# Patient Record
Sex: MALE | Race: WHITE | NOT HISPANIC OR LATINO | Employment: OTHER | URBAN - METROPOLITAN AREA
[De-identification: names, ages, dates, MRNs, and addresses within clinical notes are randomized per-mention and may not be internally consistent; named-entity substitution may affect disease eponyms.]

---

## 2017-08-14 ENCOUNTER — TELEPHONE (OUTPATIENT)
Dept: HEMATOLOGY/ONCOLOGY | Facility: CLINIC | Age: 62
End: 2017-08-14

## 2017-08-14 ENCOUNTER — TELEPHONE (OUTPATIENT)
Dept: UROLOGY | Facility: CLINIC | Age: 62
End: 2017-08-14

## 2017-08-14 NOTE — TELEPHONE ENCOUNTER
----- Message from Krystyna Rodas sent at 8/14/2017 12:17 PM CDT -----  Contact: pt   States he has bladder cancer, tumor removed but was told to have oncology and cysto scopes done   Call back        Message sent to oncology and urology

## 2017-08-14 NOTE — TELEPHONE ENCOUNTER
Spoke to pt and he is going to call back if he needs an apt with is. He says that he saw a dr valentine at [facility] and is trying to get in to see him.

## 2017-08-14 NOTE — TELEPHONE ENCOUNTER
----- Message from Krystyna Rodas sent at 8/14/2017 12:15 PM CDT -----  Contact: bruna  States he has bladder cancer, tumor removed but was told to have oncology and cysto scopes done   Call back      Message sent to oncology and urology

## 2017-08-15 ENCOUNTER — TELEPHONE (OUTPATIENT)
Dept: INFUSION THERAPY | Facility: HOSPITAL | Age: 62
End: 2017-08-15

## 2018-06-01 PROBLEM — L72.3 SEBACEOUS CYST: Status: ACTIVE | Noted: 2018-06-01

## 2018-06-01 PROBLEM — L02.212 ABSCESS OF BACK: Status: ACTIVE | Noted: 2018-06-01

## 2018-06-01 PROBLEM — L72.3 SEBACEOUS CYST: Status: RESOLVED | Noted: 2018-06-01 | Resolved: 2018-06-01

## 2018-09-28 ENCOUNTER — PATIENT OUTREACH (OUTPATIENT)
Dept: ADMINISTRATIVE | Facility: HOSPITAL | Age: 63
End: 2018-09-28

## 2018-09-28 NOTE — PROGRESS NOTES
Health Maintenance Due   Topic Date Due    Hepatitis C Screening  1955    Lipid Panel  1955    TETANUS VACCINE  02/08/1971    Colonoscopy  02/08/2003    Zoster Vaccine  02/08/2013    Pneumococcal (65+) (1 of 2 - PCV13) 02/08/2018    Abdominal Aortic Aneurysm Screening  02/08/2018    Influenza Vaccine  08/01/2018

## 2018-10-08 ENCOUNTER — PATIENT OUTREACH (OUTPATIENT)
Dept: ADMINISTRATIVE | Facility: HOSPITAL | Age: 63
End: 2018-10-08

## 2018-10-11 ENCOUNTER — PATIENT OUTREACH (OUTPATIENT)
Dept: ADMINISTRATIVE | Facility: HOSPITAL | Age: 63
End: 2018-10-11

## 2018-10-11 ENCOUNTER — OFFICE VISIT (OUTPATIENT)
Dept: FAMILY MEDICINE | Facility: CLINIC | Age: 63
End: 2018-10-11
Payer: COMMERCIAL

## 2018-10-11 VITALS
DIASTOLIC BLOOD PRESSURE: 102 MMHG | BODY MASS INDEX: 28.01 KG/M2 | HEIGHT: 72 IN | WEIGHT: 206.81 LBS | SYSTOLIC BLOOD PRESSURE: 136 MMHG | HEART RATE: 88 BPM | RESPIRATION RATE: 16 BRPM

## 2018-10-11 DIAGNOSIS — B02.29 POST HERPETIC NEURALGIA: Primary | ICD-10-CM

## 2018-10-11 PROBLEM — L02.212 ABSCESS OF BACK: Status: RESOLVED | Noted: 2018-06-01 | Resolved: 2018-10-11

## 2018-10-11 PROCEDURE — 99999 PR PBB SHADOW E&M-EST. PATIENT-LVL III: CPT | Mod: PBBFAC,,, | Performed by: FAMILY MEDICINE

## 2018-10-11 PROCEDURE — 1101F PT FALLS ASSESS-DOCD LE1/YR: CPT | Mod: CPTII,S$GLB,, | Performed by: FAMILY MEDICINE

## 2018-10-11 PROCEDURE — 99204 OFFICE O/P NEW MOD 45 MIN: CPT | Mod: S$GLB,,, | Performed by: FAMILY MEDICINE

## 2018-10-11 PROCEDURE — 3008F BODY MASS INDEX DOCD: CPT | Mod: CPTII,S$GLB,, | Performed by: FAMILY MEDICINE

## 2018-10-11 RX ORDER — TESTOSTERONE CYPIONATE 200 MG/ML
INJECTION, SOLUTION INTRAMUSCULAR
Qty: 1 ML | Refills: 1 | Status: SHIPPED | OUTPATIENT
Start: 2018-10-11

## 2018-10-11 RX ORDER — GABAPENTIN 300 MG/1
300 CAPSULE ORAL NIGHTLY
Qty: 90 CAPSULE | Refills: 1 | Status: SHIPPED | OUTPATIENT
Start: 2018-10-11 | End: 2020-11-09

## 2018-10-11 NOTE — PROGRESS NOTES
Subjective:       Patient ID: Fred Benjamin is a 63 y.o. male.    Chief Complaint: Establish Care (Patient here to establish care) and Herpes Zoster    Here as new patient. Looking for a new pcp as his previous doc, Dr. Art would not fill his pain medications when his pain management doc lost his license. Previously saw Dr. Leon.  Currently has shingles after he was septic from a bladder procedure. Shingles x 1 month and took valtrex it sounds like for 2 weeks after diagnosis.        Review of Systems   Constitutional: Negative for chills, fatigue and fever.   Respiratory: Negative for cough, chest tightness and shortness of breath.    Cardiovascular: Negative for chest pain, palpitations and leg swelling.   Endocrine: Negative for cold intolerance and heat intolerance.   Musculoskeletal: Positive for back pain.   Skin: Negative for rash.       Objective:      Physical Exam   Constitutional: He appears well-developed and well-nourished.   HENT:   Head: Normocephalic and atraumatic.   Cardiovascular: Normal rate, regular rhythm and normal heart sounds.   Pulmonary/Chest: Effort normal and breath sounds normal.   Skin:   Resolving zoster left back to chest below nipple; no secondary infection   Psychiatric: He has a normal mood and affect.   Nursing note and vitals reviewed.      Assessment:       1. Post herpetic neuralgia        Plan:       Post herpetic neuralgia    Other orders  -     gabapentin (NEURONTIN) 300 MG capsule; Take 1 capsule (300 mg total) by mouth every evening.  Dispense: 90 capsule; Refill: 1  -     testosterone cypionate (DEPOTESTOTERONE CYPIONATE) 200 mg/mL injection; INJECT 1 CC INTO THE MUSCLE EVERY 2 WEEKS  Dispense: 1 mL; Refill: 1  -     cyanocobalamin, vitamin B-12, (B-12 COMPLIANCE) 1,000 mcg/mL Kit; Inject 1,000 mcg as directed every 28 days.  Dispense: 1 kit; Refill: 1      Flu shot today.  Advised I will not do pain meds long term but may be able to help for a month or 2  if he has issues.    Follow-up in about 2 months (around 12/11/2018), or if symptoms worsen or fail to improve.

## 2018-10-11 NOTE — LETTER
October 11, 2018    Rodolfo Chauhan MD             [facility]  [address]  [facility] [address]  Phone: [phone] October 11, 2018     Patient: Fred Benjamin    YOB: 1955   Date of Visit: 10/11/2018       To Whom It May Concern:      [facility]    We are seeing Fred Benjamin in the clinic today at [facility].  Royce Valentine III, MD is their PCP.  She/He has an outstanding lab/procedure at this time when reviewing their chart.  To help with our Health Maintenance records will you please supply the following:                                                     [x]  Outside Lab     Most recent                                   Please Fax to [facility] at [phone]    Thank you for your help, SONAL Odom.  If I can be of any assistance you can call at [phone]    Sincerely,  Concepción Mcnulty  Clinical Care Coordinator  [facility] [address] [place] [address]  Phone: [phone]   Fax: [phone]

## 2018-10-11 NOTE — LETTER
October 11, 2018    Venancio Gracia MD             [facility]  [address]  [facility] [address]  Phone: [phone] October 11, 2018     Patient: Fred Benjamin    YOB: 1955   Date of Visit: 10/11/2018       To Whom It May Concern:      [facility]    We are seeing Fred Benjamin in the clinic today at [facility].  Royce Valentine III, MD is their PCP.  She/He has an outstanding lab/procedure at this time when reviewing their chart.  To help with our Health Maintenance records will you please supply the following:                                              [x]  Colonoscopy                                           Please Fax to [facility] at [phone]    Thank you for your help, SONAL Odom.  If I can be of any assistance you can call at [phone]    Sincerely,    Concepción Mcnulty  Clinical Care Coordinator  [facility] [address] [place] [address]  Phone: [phone]   Fax: [phone]

## 2018-10-12 ENCOUNTER — TELEPHONE (OUTPATIENT)
Dept: FAMILY MEDICINE | Facility: CLINIC | Age: 63
End: 2018-10-12

## 2018-10-12 NOTE — TELEPHONE ENCOUNTER
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 Please call to advise him of this.

## 2018-10-16 ENCOUNTER — TELEPHONE (OUTPATIENT)
Dept: ADMINISTRATIVE | Facility: HOSPITAL | Age: 63
End: 2018-10-16

## 2019-01-28 ENCOUNTER — NURSE TRIAGE (OUTPATIENT)
Dept: ADMINISTRATIVE | Facility: CLINIC | Age: 64
End: 2019-01-28

## 2019-01-28 NOTE — TELEPHONE ENCOUNTER
"    Reason for Disposition   Unable to urinate (or only a few drops) and bladder feels very full   Constant abdominal pain lasting > 2 hours   SEVERE abdominal pain (e.g., excruciating)     Pain 7-8/10 now in both my abdomen and rectum, but was 9/10 for an hour last night in my RLQ abdomen and rectum. Nausea, unable to urinate (urinated one time since last night and I have lower abdominal pain constantly since last night).    Protocols used:  ABDOMINAL PAIN - MALE-A-OH    Ahmet says " I have pain 7-8/10 now in both my lower abdomen and rectum, but was 9/10 for an hour last night in my RLQ abdomen and rectum. Nausea, unable to urinate (urinated one time since last night. Last time at 07 this morning.  I don't know if it's my bladder hurting, but my lower abdomen really hurts, as does my rectum").  Recommended ED now for evaluation, per [facility] triage protocol. He states he has made an appt with his GI provider, Dr Venancio Gracia for this afternoon.  Ahmet says he will go to the ED if he can't urinate before then. He then hung up. Message to ANNA MARIE Gaines MD and Dr Gracia.  Please contact caller directly with any additional care advice.     "

## 2019-05-28 ENCOUNTER — OFFICE VISIT (OUTPATIENT)
Dept: FAMILY MEDICINE | Facility: CLINIC | Age: 64
End: 2019-05-28
Payer: COMMERCIAL

## 2019-05-28 ENCOUNTER — TELEPHONE (OUTPATIENT)
Dept: FAMILY MEDICINE | Facility: CLINIC | Age: 64
End: 2019-05-28

## 2019-05-28 VITALS
DIASTOLIC BLOOD PRESSURE: 84 MMHG | WEIGHT: 206.56 LBS | HEART RATE: 79 BPM | SYSTOLIC BLOOD PRESSURE: 136 MMHG | OXYGEN SATURATION: 98 % | BODY MASS INDEX: 27.98 KG/M2 | HEIGHT: 72 IN | TEMPERATURE: 98 F

## 2019-05-28 DIAGNOSIS — R09.81 NASAL CONGESTION: ICD-10-CM

## 2019-05-28 DIAGNOSIS — J32.9 RHINOSINUSITIS: ICD-10-CM

## 2019-05-28 DIAGNOSIS — J02.9 SORE THROAT: ICD-10-CM
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
## 2019-05-28 PROCEDURE — 99214 OFFICE O/P EST MOD 30 MIN: CPT | Mod: S$GLB,,, | Performed by: NURSE PRACTITIONER

## 2019-05-28 PROCEDURE — 3075F SYST BP GE 130 - 139MM HG: CPT | Mod: CPTII,S$GLB,, | Performed by: NURSE PRACTITIONER

## 2019-05-28 PROCEDURE — 1101F PR PT FALLS ASSESS DOC 0-1 FALLS W/OUT INJ PAST YR: ICD-10-PCS | Mod: CPTII,S$GLB,, | Performed by: NURSE PRACTITIONER

## 2019-05-28 PROCEDURE — 3079F DIAST BP 80-89 MM HG: CPT | Mod: CPTII,S$GLB,, | Performed by: NURSE PRACTITIONER

## 2019-05-28 PROCEDURE — 99214 PR OFFICE/OUTPT VISIT, EST, LEVL IV, 30-39 MIN: ICD-10-PCS | Mod: S$GLB,,, | Performed by: NURSE PRACTITIONER

## 2019-05-28 PROCEDURE — 3079F PR MOST RECENT DIASTOLIC BLOOD PRESSURE 80-89 MM HG: ICD-10-PCS | Mod: CPTII,S$GLB,, | Performed by: NURSE PRACTITIONER

## 2019-05-28 PROCEDURE — 1101F PT FALLS ASSESS-DOCD LE1/YR: CPT | Mod: CPTII,S$GLB,, | Performed by: NURSE PRACTITIONER

## 2019-05-28 PROCEDURE — 99999 PR PBB SHADOW E&M-EST. PATIENT-LVL IV: CPT | Mod: PBBFAC,,, | Performed by: NURSE PRACTITIONER

## 2019-05-28 PROCEDURE — 99999 PR PBB SHADOW E&M-EST. PATIENT-LVL IV: ICD-10-PCS | Mod: PBBFAC,,, | Performed by: NURSE PRACTITIONER

## 2019-05-28 PROCEDURE — 3075F PR MOST RECENT SYSTOLIC BLOOD PRESS GE 130-139MM HG: ICD-10-PCS | Mod: CPTII,S$GLB,, | Performed by: NURSE PRACTITIONER

## 2019-05-28 RX ORDER — DOXYCYCLINE 100 MG/1
100 CAPSULE ORAL 2 TIMES DAILY
Qty: 14 CAPSULE | Refills: 0 | Status: SHIPPED | OUTPATIENT
Start: 2019-05-28 | End: 2019-06-04

## 2019-05-28 RX ORDER — AZELASTINE 1 MG/ML
1 SPRAY, METERED NASAL 2 TIMES DAILY
Qty: 30 ML | Refills: 0 | Status: SHIPPED | OUTPATIENT
Start: 2019-05-28 | End: 2020-11-09

## 2019-05-28 NOTE — TELEPHONE ENCOUNTER
----- Message from Olga Ryder sent at 5/28/2019  8:49 AM CDT -----  Contact: Fred pt  Type: Needs Medical Advice    Who Called:  Fred  Symptoms (please be specific):  Sore throat, congestion, cough/he was also bit by a tic  How long has patient had these symptoms:  Couple of days now  Pharmacy name and phone #:    [facility] [address] [facility] [address]  [address] [address]  Phone: [phone] Fax: [phone]      Best Call Back Number: [phone]  Additional Information: Pt wants to speak to someone before making an appt. Pls call to adv

## 2019-05-28 NOTE — PATIENT INSTRUCTIONS
Acute Sinusitis    Acute sinusitis is irritation and swelling of the sinuses. It is usually caused by a viral infection after a common cold. Your doctor can help you find relief.  What is acute sinusitis?  Sinuses are air-filled spaces in the skull behind the face. They are kept moist and clean by a lining of mucosa. Things such as pollen, smoke, and chemical fumes can irritate the mucosa. It can then swell up. As a response to irritation, the mucosa makes more mucus and other fluids. Tiny hairlike cilia cover the mucosa. Cilia help carry mucus toward the opening of the sinus. Too much mucus may cause the cilia to stop working. This blocks the sinus opening. A buildup of fluid in the sinuses then causes pain and pressure. It can also encourage bacteria to grow in the sinuses.  Common symptoms of acute sinusitis  You may have:  · Facial soreness pain  · Headache  · Fever  · Fluid draining in the back of the throat (postnasal drip)  · Congestion  · Drainage that is thick and colored, instead of clear  · Cough  Diagnosing acute sinusitis  Your doctor will ask about your symptoms and health history. He or she will look at your ear, nose, and throat. You usually won't need to have X-rays taken.    The doctor may take a sample of mucus to check for bacteria. If you have sinusitis that keeps coming back, you may need imaging tests such as X-rays or CAT scans. This will help your doctor check for a structural problem that may be causing the infection.  Treating acute sinusitis  Treatment is aimed at unblocking the sinus opening and helping the cilia work again. You may need to take antihistamine and decongestant medicine. These can reduce inflammation and decrease the amount of fluid your sinuses make. If you have a bacterial infection, you will need to take antibiotic medicine for 10 to 14 days. Take this medicine until it is gone, even if you feel better.  Date Last Reviewed: 10/1/2016  © 7386-2068 [employer] [address] [place] [id number]. All rights reserved. This information is not intended as a substitute for professional medical care. Always follow your healthcare professional's instructions.

## 2019-05-28 NOTE — PROGRESS NOTES
Subjective:       Patient ID: Fred Benjamin is a 64 y.o. male.    Chief Complaint: Sore Throat; Headache; Nausea; and Tick Removal    Patient who is new to me presents with tick bite and sore throat. He is concerned about lyme disease and read that doxycycline could prevent symptoms. He states he removed the tick and noticed a scab to the location.     Insect Bite   This is a new problem. Episode onset: saturday. The problem occurs daily. The problem has been gradually worsening. Associated symptoms include congestion, coughing, headaches, nausea, a rash and a sore throat. Pertinent negatives include no abdominal pain, arthralgias, chest pain, chills, fatigue, fever, joint swelling, numbness, swollen glands, urinary symptoms, vertigo, visual change, vomiting or weakness. Nothing aggravates the symptoms. He has tried nothing for the symptoms. The treatment provided mild relief.   Sinusitis   This is a new problem. The current episode started 1 to 4 weeks ago. The problem is unchanged. There has been no fever. His pain is at a severity of 3/10. Associated symptoms include congestion, coughing, headaches and a sore throat. Pertinent negatives include no chills, ear pain, shortness of breath, sinus pressure or swollen glands. Past treatments include antibiotics (tried cipro for symptoms). The treatment provided mild relief.     Review of Systems   Constitutional: Negative for chills, fatigue and fever.   HENT: Positive for congestion, postnasal drip, rhinorrhea and sore throat. Negative for ear pain and sinus pressure.    Respiratory: Positive for cough. Negative for shortness of breath and wheezing.    Cardiovascular: Negative for chest pain and leg swelling.   Gastrointestinal: Positive for nausea. Negative for abdominal distention, abdominal pain and vomiting.   Genitourinary: Negative for dysuria and flank pain.   Musculoskeletal: Negative for arthralgias and joint swelling.   Skin: Positive for rash.  Negative for color change and pallor.   Neurological: Positive for headaches. Negative for vertigo, weakness, light-headedness and numbness.       Objective:      Physical Exam   Constitutional: He is oriented to person, place, and time. Vital signs are normal. He appears well-developed and well-nourished.   HENT:   Head: Normocephalic and atraumatic.   Right Ear: Hearing, tympanic membrane, external ear and ear canal normal.   Left Ear: Hearing, tympanic membrane, external ear and ear canal normal.   Nose: Mucosal edema and rhinorrhea present. Right sinus exhibits no maxillary sinus tenderness and no frontal sinus tenderness. Left sinus exhibits no maxillary sinus tenderness and no frontal sinus tenderness.   Mouth/Throat: Mucous membranes are normal. Posterior oropharyngeal erythema present.   Eyes: Pupils are equal, round, and reactive to light. Conjunctivae and EOM are normal.   Neck: Normal range of motion. Neck supple.   Cardiovascular: Normal rate and regular rhythm.   Pulmonary/Chest: Effort normal and breath sounds normal.   Abdominal: Soft. Bowel sounds are normal.   Musculoskeletal: Normal range of motion.   Neurological: He is alert and oriented to person, place, and time.   Skin: Skin is warm and dry.        Nursing note and vitals reviewed.          Assessment:       1. Insect bite, initial encounter    2. Nasal congestion    3. Rhinosinusitis    4. Sore throat        Plan:       Insect bite, initial encounter  -     doxycycline (MONODOX) 100 MG capsule; Take 1 capsule (100 mg total) by mouth 2 (two) times daily. for 7 days  Dispense: 14 capsule; Refill: 0    Nasal congestion  -     azelastine (ASTELIN) 137 mcg (0.1 %) nasal spray; 1 spray (137 mcg total) by Nasal route 2 (two) times daily.  Dispense: 30 mL; Refill: 0    Rhinosinusitis  -     azelastine (ASTELIN) 137 mcg (0.1 %) nasal spray; 1 spray (137 mcg total) by Nasal route 2 (two) times daily.  Dispense: 30 mL; Refill: 0    Sore throat  -      azelastine (ASTELIN) 137 mcg (0.1 %) nasal spray; 1 spray (137 mcg total) by Nasal route 2 (two) times daily.  Dispense: 30 mL; Refill: 0      Discussed risk of taking antibiotics without s/s of infection. Patient would like doxycycline as he knows someone who is suffering with chronic lyme. Discussed possible causes of sore throat. Discussed nasal rinses and starting an antihistamine. Follow up as needed.

## 2019-05-28 NOTE — TELEPHONE ENCOUNTER
Spoke with pt, req a same day appt, appt made with NP, pt states got bit by tick on sat while mowing grass, having congestion no fever

## 2020-05-10 ENCOUNTER — OFFICE VISIT (OUTPATIENT)
Dept: URGENT CARE | Facility: CLINIC | Age: 65
End: 2020-05-10
Payer: COMMERCIAL

## 2020-05-10 VITALS
WEIGHT: 206 LBS | HEART RATE: 83 BPM | OXYGEN SATURATION: 98 % | BODY MASS INDEX: 27.9 KG/M2 | RESPIRATION RATE: 16 BRPM | HEIGHT: 72 IN | TEMPERATURE: 98 F

## 2020-05-10 DIAGNOSIS — U07.1 COVID-19: Primary | ICD-10-CM

## 2020-05-10 LAB — SARS-COV-2 IGG SERPLBLD QL IA.RAPID: POSITIVE

## 2020-05-10 PROCEDURE — 86769 SARS-COV-2 COVID-19 ANTIBODY: CPT

## 2020-05-10 PROCEDURE — 99211 PR OFFICE/OUTPT VISIT, EST, LEVL I: ICD-10-PCS | Mod: S$GLB,,, | Performed by: PHYSICIAN ASSISTANT

## 2020-05-10 PROCEDURE — 99211 OFF/OP EST MAY X REQ PHY/QHP: CPT | Mod: S$GLB,,, | Performed by: PHYSICIAN ASSISTANT

## 2020-05-10 PROCEDURE — U0002 COVID-19 LAB TEST NON-CDC: HCPCS

## 2020-05-10 NOTE — PATIENT INSTRUCTIONS
Instructions for Patients Awaiting COVID-19 Test Results    You will either be called with your test result or it will be released to the patient portal.  If you have any questions about your test, please visit [email or web address] or call our COVID-19 information line at [phone].    Prevention steps for patients with confirmed or suspected COVID-19     Stay home except to get medical care.   Separate yourself from other people and animals in your home   Call ahead before visiting your doctor.   Wear a facemask.   Cover your coughs and sneezes.   Clean your hands often.   Avoid sharing personal household items.   Clean all high-touch surfaces every day.   Monitor your symptoms. Seek prompt medical attention if your illness is worsening (e.g., difficulty breathing). Before seeking care, call your healthcare provider.   If you have a medical emergency and need to call 911, notify the dispatch personnel that you have, or are being evaluated for COVID-19. If possible, put on a facemask before emergency medical services arrive.   Discontinuing home isolation. Call your provider about guidance to discontinue home isolation.    Recommended precautions for household members, intimate partners, and caregivers in a nonhealthcare setting of a patient with symptomatic laboratory-confirmed COVID-19 or a patient under investigation.  Household members, intimate partners, and caregivers in a nonhealthcare setting may have close contact with a person with symptomatic, laboratory-confirmed COVID-19 or a person under investigation. Close contacts should monitor their health; they should call their healthcare provider right away if they develop symptoms suggestive of COVID-19 (e.g., fever, cough, shortness of breath).    Close contacts should also follow these recommendations:   Make sure that you understand and can help the patient follow their healthcare provider's instructions for medication(s) and care.  You should help the patient with basic needs in the home and provide support for getting groceries, prescriptions, and other personal needs.   Monitor the patient's symptoms. If the patient is getting sicker, call his or her healthcare provider and tell them that the patient has laboratory-confirmed COVID-19. This will help the healthcare provider's office take steps to keep other people in the office or waiting room from getting infected. Ask the healthcare provider to call the local or [facility] health department for additional guidance. If the patient has a medical emergency and you need to call 911, notify the dispatch personnel that the patient has, or is being evaluated for COVID-19.   Household members should stay in another room or be  from the patient as much as possible. Household members should use a separate bedroom and bathroom, if available.   Prohibit visitors who do not have an essential need to be in the home.   Household members should care for any pets in the home. Do not handle pets or other animals while sick.   Make sure that shared spaces in the home have good air flow, such as by an air conditioner or an opened window, weather permitting.   Perform hand hygiene frequently. Wash your hands often with soap and water for at least 20 seconds or use an alcohol-based hand  that contains 60 to 95% alcohol, covering all surfaces of your hands and rubbing them together until they feel dry. Soap and water should be used preferentially if hands are visibly dirty.   Avoid touching your eyes, nose, and mouth with unwashed hands.   The patient should wear a facemask when you are around other people. If the patient is not able to wear a facemask (for example, because it causes trouble breathing), you, as the caregiver should wear a mask when you are in the same room as the patient.   Wear a disposable facemask and gloves when you touch or have contact with the patient's blood, stool,  or body fluids, such as saliva, sputum, nasal mucus, vomit, urine.  o Throw out disposable facemasks and gloves after using them. Do not reuse.  o When removing personal protective equipment, first remove and dispose of gloves. Then, immediately clean your hands with soap and water or alcohol-based hand . Next, remove and dispose of facemask, and immediately clean your hands again with soap and water or alcohol-based hand .   Avoid sharing household items with the patient. You should not share dishes, drinking glasses, cups, eating utensils, towels, bedding, or other items. After the patient uses these items, you should wash them thoroughly (see below Wash laundry thoroughly).   Clean all high-touch surfaces, such as counters, tabletops, doorknobs, bathroom fixtures, toilets, phones, keyboards, tablets, and bedside tables, every day. Also, clean any surfaces that may have blood, stool, or body fluids on them.   Use a household cleaning spray or wipe, according to the label instructions. Labels contain instructions for safe and effective use of the cleaning product including precautions you should take when applying the product, such as wearing gloves and making sure you have good ventilation during use of the product.   Wash laundry thoroughly.  o Immediately remove and wash clothes or bedding that have blood, stool, or body fluids on them.  o Wear disposable gloves while handling soiled items and keep soiled items away from your body. Clean your hands (with soap and water or an alcohol-based hand ) immediately after removing your gloves.  o Read and follow directions on labels of laundry or clothing items and detergent. In general, using a normal laundry detergent according to washing machine instructions and dry thoroughly using the warmest temperatures recommended on the clothing label.   Place all used disposable gloves, facemasks, and other contaminated items in a lined  container before disposing of them with other household waste. Clean your hands (with soap and water or an alcohol-based hand ) immediately after handling these items. Soap and water should be used preferentially if hands are visibly dirty.   Discuss any additional questions with your state or local health department or healthcare provider. Check available hours when contacting your local health department.    For more information see CDC link below.      https://www.cdc.gov/coronavirus/2019-ncov/hcp/guidance-prevent-spread.html#precautions        Sources:  CDC, [place] Department of Health and [facility]        If not allergic,take tylenol (acetominophen) for fever control, chills, or body aches every 4 hours. Do not exceed 4000 mg/ day.If not allergic, take Motrin (Ibuprofen) every 4 hours for fever, chills, pain or inflammation. Do not exceed 2400 mg/day. You can alternate taking tylenol and motrin.    If you were prescribed a narcotic medication, do not drive or operate heavy equipment or machinery while taking these medications.  You must understand that you've received an Urgent Care treatment only and that you may be released before all your medical problems are known or treated. You, the patient, will arrange for follow up care as instructed.  Follow up with your PCP or specialty clinic as directed in the next 1-2 weeks if not improved or as needed.  You can call [phone] to schedule an appointment with the appropriate provider.  If your condition worsens we recommend that you receive another evaluation at the emergency room immediately or contact your primary medical clinics after hours call service to discuss your concerns.    Please return here or go to the Emergency Department for any concerns or worsening of condition.    If you have been referred to another provider and wish to call to check on the status of your referral, please call [facility] at [phone]

## 2020-05-10 NOTE — PROGRESS NOTES
Pt states that his wife was tested positive for COVID on March 16.  He states then he got really sick, but just presumed that he was positive.  He states that he needs to be tested to be cleared.   Patient's wife was confirmed +, he had symptoms about 2 months ago but was never tested. His pain clinic will not let him enter without a negative test. Will swab and test for antibodies today. Answered all questions.

## 2020-05-11 ENCOUNTER — TELEPHONE (OUTPATIENT)
Dept: URGENT CARE | Facility: CLINIC | Age: 65
End: 2020-05-11

## 2020-05-11 ENCOUNTER — TELEPHONE (OUTPATIENT)
Dept: FAMILY MEDICINE | Facility: CLINIC | Age: 65
End: 2020-05-11

## 2020-05-11 LAB — SARS-COV-2 RNA RESP QL NAA+PROBE: NOT DETECTED

## 2020-05-11 NOTE — TELEPHONE ENCOUNTER
----- Message from Rose Puente sent at 5/11/2020  8:44 AM CDT -----  Contact: Patient  Type: Needs Medical Advice  Who Called:  Patient  Best Call Back Number:   Additional Information: Patient has tested positive for covid-19 and wanted to notify Dr Gaines of his condition.

## 2020-09-17 ENCOUNTER — TELEPHONE (OUTPATIENT)
Dept: RHEUMATOLOGY | Facility: CLINIC | Age: 65
End: 2020-09-17

## 2020-09-17 NOTE — TELEPHONE ENCOUNTER
----- Message from Danya Lebron sent at 9/17/2020 10:30 AM CDT -----  Contact: patient  Type: Needs Medical Advice  Who Called:  patient    Best Call Back Number: [phone] (home)     Additional Information: patient request to schedule a new patient appointment, please contact

## 2020-10-05 ENCOUNTER — PATIENT MESSAGE (OUTPATIENT)
Dept: ADMINISTRATIVE | Facility: HOSPITAL | Age: 65
End: 2020-10-05

## 2020-11-09 ENCOUNTER — OFFICE VISIT (OUTPATIENT)
Dept: FAMILY MEDICINE | Facility: CLINIC | Age: 65
End: 2020-11-09
Payer: COMMERCIAL

## 2020-11-09 VITALS
HEIGHT: 72 IN | OXYGEN SATURATION: 97 % | WEIGHT: 202.19 LBS | DIASTOLIC BLOOD PRESSURE: 68 MMHG | TEMPERATURE: 98 F | SYSTOLIC BLOOD PRESSURE: 92 MMHG | BODY MASS INDEX: 27.39 KG/M2 | HEART RATE: 87 BPM

## 2020-11-09 DIAGNOSIS — Z00.00 WELLNESS EXAMINATION: Primary | ICD-10-CM

## 2020-11-09 DIAGNOSIS — M25.50 PAIN IN JOINT INVOLVING MULTIPLE SITES: ICD-10-CM

## 2020-11-09 PROCEDURE — 99397 PR PREVENTIVE VISIT,EST,65 & OVER: ICD-10-PCS | Mod: 25,S$GLB,, | Performed by: FAMILY MEDICINE

## 2020-11-09 PROCEDURE — 3074F SYST BP LT 130 MM HG: CPT | Mod: CPTII,S$GLB,, | Performed by: FAMILY MEDICINE

## 2020-11-09 PROCEDURE — 3078F DIAST BP <80 MM HG: CPT | Mod: CPTII,S$GLB,, | Performed by: FAMILY MEDICINE

## 2020-11-09 PROCEDURE — 90471 FLU VACCINE - QUADRIVALENT - ADJUVANTED: ICD-10-PCS | Mod: S$GLB,,, | Performed by: FAMILY MEDICINE

## 2020-11-09 PROCEDURE — 99999 PR PBB SHADOW E&M-EST. PATIENT-LVL IV: CPT | Mod: PBBFAC,,, | Performed by: FAMILY MEDICINE

## 2020-11-09 PROCEDURE — 90694 VACC AIIV4 NO PRSRV 0.5ML IM: CPT | Mod: S$GLB,,, | Performed by: FAMILY MEDICINE

## 2020-11-09 PROCEDURE — 99397 PER PM REEVAL EST PAT 65+ YR: CPT | Mod: 25,S$GLB,, | Performed by: FAMILY MEDICINE

## 2020-11-09 PROCEDURE — 99999 PR PBB SHADOW E&M-EST. PATIENT-LVL IV: ICD-10-PCS | Mod: PBBFAC,,, | Performed by: FAMILY MEDICINE

## 2020-11-09 PROCEDURE — 3078F PR MOST RECENT DIASTOLIC BLOOD PRESSURE < 80 MM HG: ICD-10-PCS | Mod: CPTII,S$GLB,, | Performed by: FAMILY MEDICINE

## 2020-11-09 PROCEDURE — 3074F PR MOST RECENT SYSTOLIC BLOOD PRESSURE < 130 MM HG: ICD-10-PCS | Mod: CPTII,S$GLB,, | Performed by: FAMILY MEDICINE

## 2020-11-09 PROCEDURE — 90471 IMMUNIZATION ADMIN: CPT | Mod: S$GLB,,, | Performed by: FAMILY MEDICINE

## 2020-11-09 PROCEDURE — 90694 FLU VACCINE - QUADRIVALENT - ADJUVANTED: ICD-10-PCS | Mod: S$GLB,,, | Performed by: FAMILY MEDICINE

## 2020-11-09 RX ORDER — DILTIAZEM HYDROCHLORIDE 240 MG/1
240 CAPSULE, COATED, EXTENDED RELEASE ORAL DAILY
COMMUNITY

## 2020-11-09 RX ORDER — AMLODIPINE BESYLATE AND ATORVASTATIN CALCIUM 10; 10 MG/1; MG/1
1 TABLET, FILM COATED ORAL DAILY
COMMUNITY
End: 2021-08-24

## 2020-11-09 RX ORDER — OLMESARTAN MEDOXOMIL 40 MG/1
40 TABLET ORAL DAILY
COMMUNITY
End: 2021-08-24

## 2020-11-09 NOTE — PROGRESS NOTES
Subjective:       Patient ID: Fred Benjamin is a 65 y.o. male.    Chief Complaint: Annual Exam    Here for wellness and f/u afib.  Family hx of rheum issue and would like referral as he has joint pain as well.     Review of Systems   Constitutional: Negative for chills and fever.   Respiratory: Negative for cough, chest tightness and shortness of breath.    Cardiovascular: Negative for chest pain, palpitations and leg swelling.   Endocrine: Negative for cold intolerance and heat intolerance.   Psychiatric/Behavioral: Negative for decreased concentration. The patient is not nervous/anxious.        Objective:      Physical Exam  Vitals signs and nursing note reviewed.   Constitutional:       Appearance: He is well-developed.   HENT:      Head: Normocephalic and atraumatic.   Cardiovascular:      Rate and Rhythm: Normal rate and regular rhythm.      Heart sounds: Normal heart sounds.   Pulmonary:      Effort: Pulmonary effort is normal.      Breath sounds: Normal breath sounds.         Assessment:       1. Wellness examination    2. Pain in joint involving multiple sites        Plan:       Wellness examination    Pain in joint involving multiple sites  -     Ambulatory referral/consult to Rheumatology; Future; Expected date: 11/16/2020    Other orders  -     Influenza - Quadrivalent (Adjuvanted)      To f/u with Dr. Anderson regarding recent med changes to control afib yet now with lower bp and dizziness.    Follow up in about 1 year (around 11/9/2021), or if symptoms worsen or fail to improve.

## 2020-11-10 ENCOUNTER — PATIENT OUTREACH (OUTPATIENT)
Dept: ADMINISTRATIVE | Facility: HOSPITAL | Age: 65
End: 2020-11-10

## 2020-11-10 NOTE — LETTER
AUTHORIZATION FOR RELEASE OF   CONFIDENTIAL INFORMATION    Dear Royce Valentine III, MD,    We are seeing Fred Benjamin, date of birth 1955, in the clinic at [facility]. AVE Gaines MD is the patient's PCP. Fred Benjamin has an outstanding lab/procedure at the time we reviewed his chart. In order to help keep his health information updated, he has authorized us to request the following medical record(s):                                             (X)  OUTSIDE LAB RESULTS                                                                                                 Lipid panel           Please fax records to [facility], W Michael Ellerbe, MD, [phone].     If you have any questions, please contact   Samantha Prieto, Care Coordinator  [facility]  Phone: [phone]  FAX: [phone]          Patient Name: Fred Benjamin  : 1955  Patient Phone #: [phone]

## 2020-11-10 NOTE — LETTER
AUTHORIZATION FOR RELEASE OF   CONFIDENTIAL INFORMATION    Dear Venancio Gracia MD,    We are seeing Fred Benjamin, date of birth 1955, in the clinic at [facility]. AVE Gaines MD is the patient's PCP. Fred Benjamin has an outstanding lab/procedure at the time we reviewed his chart. In order to help keep his health information updated, he has authorized us to request the following medical record(s):                                       ( X )  COLONOSCOPY            Please fax records to [facility], W Michael Ellerbe, MD, [phone].     If you have any questions, please contact   Samantha Prieto, Care Coordinator  [facility]  Phone: [phone]  FAX: [phone]            Patient Name: Fred Benjamin  : 1955  Patient Phone #: [phone]

## 2020-11-12 NOTE — PROGRESS NOTES
Dr Rizvi's office only had colonoscopy from 2017.    Received fax from Dr. Anderson's office stated that no lipid panel was performed.

## 2021-04-29 ENCOUNTER — PATIENT MESSAGE (OUTPATIENT)
Dept: RESEARCH | Facility: HOSPITAL | Age: 66
End: 2021-04-29

## 2022-05-31 ENCOUNTER — PATIENT MESSAGE (OUTPATIENT)
Dept: ADMINISTRATIVE | Facility: HOSPITAL | Age: 67
End: 2022-05-31

## 2024-09-17 ENCOUNTER — DOCUMENTATION ONLY (OUTPATIENT)
Dept: HEMATOLOGY/ONCOLOGY | Facility: CLINIC | Age: 69
End: 2024-09-17
Payer: MEDICARE

## 2024-09-17 NOTE — NURSING
Received message from Dr. Ochoa to have patient scheduled with Dr. Butler, pt see's outside [facility].  Messaged [facility] to coordinate scheduling of patient.

## 2024-09-18 ENCOUNTER — TUMOR BOARD CONFERENCE (OUTPATIENT)
Dept: HEMATOLOGY/ONCOLOGY | Facility: CLINIC | Age: 69
End: 2024-09-18
Payer: MEDICARE

## 2024-09-18 ENCOUNTER — TELEPHONE (OUTPATIENT)
Dept: SURGERY | Facility: CLINIC | Age: 69
End: 2024-09-18
Payer: MEDICARE

## 2024-09-18 ENCOUNTER — TELEPHONE (OUTPATIENT)
Dept: HEMATOLOGY/ONCOLOGY | Facility: CLINIC | Age: 69
End: 2024-09-18
Payer: MEDICARE

## 2024-09-18 DIAGNOSIS — C25.9 PANCREATIC ADENOCARCINOMA: Primary | ICD-10-CM

## 2024-09-18 NOTE — NURSING
Spoke with patient, scheduled with Dr. Suero 9/27 following 9/25 appointment with Dr. Butler at [facility], pt verbalized agreement to time date location.  Imaging scheduled.  All questions and concerns addressed at this time.  Will continue to follow.

## 2024-09-19 ENCOUNTER — TELEPHONE (OUTPATIENT)
Dept: SURGERY | Facility: CLINIC | Age: 69
End: 2024-09-19
Payer: MEDICARE

## 2024-09-19 ENCOUNTER — HOSPITAL ENCOUNTER (OUTPATIENT)
Dept: RADIOLOGY | Facility: HOSPITAL | Age: 69
Discharge: HOME | End: 2024-09-19
Attending: SURGERY
Payer: MEDICARE

## 2024-09-19 ENCOUNTER — LAB VISIT (OUTPATIENT)
Dept: LAB | Facility: HOSPITAL | Age: 69
End: 2024-09-19
Attending: SURGERY
Payer: MEDICARE

## 2024-09-19 DIAGNOSIS — C25.9 PANCREATIC ADENOCARCINOMA: ICD-10-CM

## 2024-09-19 LAB
ALBUMIN SERPL BCP-MCNC: 3.2 G/DL (ref 3.5–5.2)
ALP SERPL-CCNC: 89 U/L (ref 55–135)
ALT SERPL W/O P-5'-P-CCNC: 15 U/L (ref 10–44)
ANION GAP SERPL CALC-SCNC: 10 MMOL/L (ref 8–16)
AST SERPL-CCNC: 17 U/L (ref 10–40)
BASOPHILS # BLD AUTO: 0.07 K/UL (ref 0–0.2)
BASOPHILS NFR BLD: 0.6 % (ref 0–1.9)
BILIRUB SERPL-MCNC: 0.6 MG/DL (ref 0.1–1)
BUN SERPL-MCNC: 13 MG/DL (ref 8–23)
CALCIUM SERPL-MCNC: 9.6 MG/DL (ref 8.7–10.5)
CANCER AG19-9 SERPL-ACNC: 16.9 U/ML (ref 0–40)
CHLORIDE SERPL-SCNC: 103 MMOL/L (ref 95–110)
CO2 SERPL-SCNC: 26 MMOL/L (ref 23–29)
CREAT SERPL-MCNC: 1.1 MG/DL (ref 0.5–1.4)
DIFFERENTIAL METHOD BLD: ABNORMAL
EOSINOPHIL # BLD AUTO: 0.2 K/UL (ref 0–0.5)
EOSINOPHIL NFR BLD: 1.9 % (ref 0–8)
ERYTHROCYTE [DISTWIDTH] IN BLOOD BY AUTOMATED COUNT: 14.2 % (ref 11.5–14.5)
EST. GFR  (NO RACE VARIABLE): >60 ML/MIN/1.73 M^2
GLUCOSE SERPL-MCNC: 108 MG/DL (ref 70–110)
HCT VFR BLD AUTO: 41.1 % (ref 40–54)
HGB BLD-MCNC: 13.1 G/DL (ref 14–18)
IMM GRANULOCYTES # BLD AUTO: 0.05 K/UL (ref 0–0.04)
IMM GRANULOCYTES NFR BLD AUTO: 0.4 % (ref 0–0.5)
LYMPHOCYTES # BLD AUTO: 2.3 K/UL (ref 1–4.8)
LYMPHOCYTES NFR BLD: 20.3 % (ref 18–48)
MCH RBC QN AUTO: 26.8 PG (ref 27–31)
MCHC RBC AUTO-ENTMCNC: 31.9 G/DL (ref 32–36)
MCV RBC AUTO: 84 FL (ref 82–98)
MONOCYTES # BLD AUTO: 0.9 K/UL (ref 0.3–1)
MONOCYTES NFR BLD: 8.1 % (ref 4–15)
NEUTROPHILS # BLD AUTO: 7.9 K/UL (ref 1.8–7.7)
NEUTROPHILS NFR BLD: 68.7 % (ref 38–73)
NRBC BLD-RTO: 0 /100 WBC
PLATELET # BLD AUTO: 279 K/UL (ref 150–450)
PMV BLD AUTO: 9 FL (ref 9.2–12.9)
POTASSIUM SERPL-SCNC: 4.8 MMOL/L (ref 3.5–5.1)
PROT SERPL-MCNC: 8.1 G/DL (ref 6–8.4)
RBC # BLD AUTO: 4.89 M/UL (ref 4.6–6.2)
SODIUM SERPL-SCNC: 139 MMOL/L (ref 136–145)
WBC # BLD AUTO: 11.47 K/UL (ref 3.9–12.7)

## 2024-09-19 PROCEDURE — 71260 CT THORAX DX C+: CPT | Mod: TC

## 2024-09-19 PROCEDURE — 74177 CT ABD & PELVIS W/CONTRAST: CPT | Mod: TC

## 2024-09-19 PROCEDURE — 86301 IMMUNOASSAY TUMOR CA 19-9: CPT | Performed by: SURGERY

## 2024-09-19 PROCEDURE — 25500020 PHARM REV CODE 255

## 2024-09-19 PROCEDURE — 36415 COLL VENOUS BLD VENIPUNCTURE: CPT | Mod: PO | Performed by: SURGERY

## 2024-09-19 PROCEDURE — 74177 CT ABD & PELVIS W/CONTRAST: CPT | Mod: 26,,, | Performed by: RADIOLOGY

## 2024-09-19 PROCEDURE — 71260 CT THORAX DX C+: CPT | Mod: 26,,, | Performed by: RADIOLOGY

## 2024-09-19 PROCEDURE — 80053 COMPREHEN METABOLIC PANEL: CPT | Mod: PO | Performed by: SURGERY

## 2024-09-19 PROCEDURE — 85025 COMPLETE CBC W/AUTO DIFF WBC: CPT | Mod: PO | Performed by: SURGERY

## 2024-09-19 RX ADMIN — IOHEXOL 100 ML: 350 INJECTION, SOLUTION INTRAVENOUS at 05:09

## 2024-09-20 ENCOUNTER — HOSPITAL ENCOUNTER (OUTPATIENT)
Facility: HOSPITAL | Age: 69
Discharge: HOME | End: 2024-09-20
Attending: INTERNAL MEDICINE | Admitting: INTERNAL MEDICINE
Payer: MEDICARE

## 2024-09-20 ENCOUNTER — ANESTHESIA (OUTPATIENT)
Dept: SURGERY | Facility: HOSPITAL | Age: 69
End: 2024-09-20
Payer: MEDICARE

## 2024-09-20 ENCOUNTER — ANESTHESIA EVENT (OUTPATIENT)
Dept: SURGERY | Facility: HOSPITAL | Age: 69
End: 2024-09-20
Payer: MEDICARE

## 2024-09-20 VITALS
RESPIRATION RATE: 12 BRPM | TEMPERATURE: 97 F | OXYGEN SATURATION: 100 % | SYSTOLIC BLOOD PRESSURE: 133 MMHG | DIASTOLIC BLOOD PRESSURE: 85 MMHG | HEART RATE: 70 BPM

## 2024-09-20 DIAGNOSIS — R52 PAIN: ICD-10-CM

## 2024-09-20 PROCEDURE — 37000008 HC ANESTHESIA 1ST 15 MINUTES: Performed by: INTERNAL MEDICINE

## 2024-09-20 PROCEDURE — 63600175 PHARM REV CODE 636 W HCPCS: Performed by: STUDENT IN AN ORGANIZED HEALTH CARE EDUCATION/TRAINING PROGRAM

## 2024-09-20 PROCEDURE — 37000009 HC ANESTHESIA EA ADD 15 MINS: Performed by: INTERNAL MEDICINE

## 2024-09-20 PROCEDURE — 63600175 PHARM REV CODE 636 W HCPCS: Performed by: ANESTHESIOLOGY

## 2024-09-20 PROCEDURE — 43277 ERCP EA DUCT/AMPULLA DILATE: CPT | Performed by: INTERNAL MEDICINE

## 2024-09-20 PROCEDURE — 27202125 HC BALLOON, EXTRACTION (ANY): Performed by: INTERNAL MEDICINE

## 2024-09-20 PROCEDURE — 25500020 PHARM REV CODE 255: Performed by: INTERNAL MEDICINE

## 2024-09-20 PROCEDURE — 25000003 PHARM REV CODE 250: Performed by: STUDENT IN AN ORGANIZED HEALTH CARE EDUCATION/TRAINING PROGRAM

## 2024-09-20 PROCEDURE — 74328 X-RAY BILE DUCT ENDOSCOPY: CPT | Mod: TC | Performed by: INTERNAL MEDICINE

## 2024-09-20 PROCEDURE — 25000003 PHARM REV CODE 250: Performed by: INTERNAL MEDICINE

## 2024-09-20 PROCEDURE — C1726 CATH, BAL DIL, NON-VASCULAR: HCPCS | Performed by: INTERNAL MEDICINE

## 2024-09-20 PROCEDURE — C1769 GUIDE WIRE: HCPCS | Performed by: INTERNAL MEDICINE

## 2024-09-20 PROCEDURE — 27000014 HC INFLATION DEVICE: Performed by: INTERNAL MEDICINE

## 2024-09-20 RX ORDER — ROCURONIUM BROMIDE 10 MG/ML
INJECTION, SOLUTION INTRAVENOUS
Status: DISCONTINUED | OUTPATIENT
Start: 2024-09-20 | End: 2024-09-20

## 2024-09-20 RX ORDER — GLUCAGON 1 MG
1 KIT INJECTION
Status: DISCONTINUED | OUTPATIENT
Start: 2024-09-20 | End: 2024-09-20 | Stop reason: HOSPADM

## 2024-09-20 RX ORDER — PROPOFOL 10 MG/ML
VIAL (ML) INTRAVENOUS
Status: DISCONTINUED | OUTPATIENT
Start: 2024-09-20 | End: 2024-09-20

## 2024-09-20 RX ORDER — MEPERIDINE HYDROCHLORIDE 50 MG/ML
12.5 INJECTION INTRAMUSCULAR; INTRAVENOUS; SUBCUTANEOUS EVERY 10 MIN PRN
Status: DISCONTINUED | OUTPATIENT
Start: 2024-09-20 | End: 2024-09-20 | Stop reason: HOSPADM

## 2024-09-20 RX ORDER — HYDROMORPHONE HYDROCHLORIDE 1 MG/ML
0.2 INJECTION, SOLUTION INTRAMUSCULAR; INTRAVENOUS; SUBCUTANEOUS EVERY 5 MIN PRN
Status: DISCONTINUED | OUTPATIENT
Start: 2024-09-20 | End: 2024-09-20 | Stop reason: HOSPADM

## 2024-09-20 RX ORDER — INDOMETHACIN 50 MG/1
SUPPOSITORY RECTAL
Status: COMPLETED | OUTPATIENT
Start: 2024-09-20 | End: 2024-09-20

## 2024-09-20 RX ORDER — LIDOCAINE HYDROCHLORIDE 20 MG/ML
INJECTION, SOLUTION EPIDURAL; INFILTRATION; INTRACAUDAL; PERINEURAL
Status: DISCONTINUED | OUTPATIENT
Start: 2024-09-20 | End: 2024-09-20

## 2024-09-20 RX ORDER — FAMOTIDINE 10 MG/ML
INJECTION INTRAVENOUS
Status: DISCONTINUED | OUTPATIENT
Start: 2024-09-20 | End: 2024-09-20

## 2024-09-20 RX ORDER — SUCCINYLCHOLINE CHLORIDE 20 MG/ML
INJECTION INTRAMUSCULAR; INTRAVENOUS
Status: DISCONTINUED | OUTPATIENT
Start: 2024-09-20 | End: 2024-09-20

## 2024-09-20 RX ORDER — ACETAMINOPHEN 10 MG/ML
INJECTION, SOLUTION INTRAVENOUS
Status: DISCONTINUED | OUTPATIENT
Start: 2024-09-20 | End: 2024-09-20

## 2024-09-20 RX ORDER — LIDOCAINE HYDROCHLORIDE 20 MG/ML
JELLY TOPICAL
Status: DISCONTINUED | OUTPATIENT
Start: 2024-09-20 | End: 2024-09-20

## 2024-09-20 RX ORDER — SODIUM CHLORIDE, SODIUM LACTATE, POTASSIUM CHLORIDE, CALCIUM CHLORIDE 600; 310; 30; 20 MG/100ML; MG/100ML; MG/100ML; MG/100ML
INJECTION, SOLUTION INTRAVENOUS CONTINUOUS PRN
Status: DISCONTINUED | OUTPATIENT
Start: 2024-09-20 | End: 2024-09-20

## 2024-09-20 RX ORDER — DIPHENHYDRAMINE HYDROCHLORIDE 50 MG/ML
12.5 INJECTION INTRAMUSCULAR; INTRAVENOUS
Status: DISCONTINUED | OUTPATIENT
Start: 2024-09-20 | End: 2024-09-20 | Stop reason: HOSPADM

## 2024-09-20 RX ORDER — ONDANSETRON HYDROCHLORIDE 2 MG/ML
4 INJECTION, SOLUTION INTRAVENOUS DAILY PRN
Status: DISCONTINUED | OUTPATIENT
Start: 2024-09-20 | End: 2024-09-20 | Stop reason: HOSPADM

## 2024-09-20 RX ORDER — FENTANYL CITRATE 50 UG/ML
INJECTION, SOLUTION INTRAMUSCULAR; INTRAVENOUS
Status: DISCONTINUED | OUTPATIENT
Start: 2024-09-20 | End: 2024-09-20

## 2024-09-20 RX ORDER — OXYCODONE HYDROCHLORIDE 5 MG/1
5 TABLET ORAL
Status: DISCONTINUED | OUTPATIENT
Start: 2024-09-20 | End: 2024-09-20 | Stop reason: HOSPADM

## 2024-09-20 RX ORDER — ONDANSETRON HYDROCHLORIDE 2 MG/ML
INJECTION, SOLUTION INTRAVENOUS
Status: DISCONTINUED | OUTPATIENT
Start: 2024-09-20 | End: 2024-09-20

## 2024-09-20 RX ADMIN — SODIUM CHLORIDE, SODIUM LACTATE, POTASSIUM CHLORIDE, AND CALCIUM CHLORIDE: .6; .31; .03; .02 INJECTION, SOLUTION INTRAVENOUS at 02:09

## 2024-09-20 RX ADMIN — PROPOFOL 200 MG: 10 INJECTION, EMULSION INTRAVENOUS at 02:09

## 2024-09-20 RX ADMIN — LIDOCAINE HYDROCHLORIDE 100 MG: 20 INJECTION, SOLUTION INTRAVENOUS at 02:09

## 2024-09-20 RX ADMIN — FENTANYL CITRATE 50 MCG: 50 INJECTION, SOLUTION INTRAMUSCULAR; INTRAVENOUS at 02:09

## 2024-09-20 RX ADMIN — Medication 200 MG: at 02:09

## 2024-09-20 RX ADMIN — ACETAMINOPHEN 1000 MG: 10 INJECTION, SOLUTION INTRAVENOUS at 02:09

## 2024-09-20 RX ADMIN — ROCURONIUM BROMIDE 15 MG: 10 INJECTION, SOLUTION INTRAVENOUS at 02:09

## 2024-09-20 RX ADMIN — ROCURONIUM BROMIDE 5 MG: 10 INJECTION, SOLUTION INTRAVENOUS at 02:09

## 2024-09-20 RX ADMIN — LIDOCAINE HYDROCHLORIDE 4 ML: 20 JELLY TOPICAL at 02:09

## 2024-09-20 RX ADMIN — FAMOTIDINE 20 MG: 10 INJECTION, SOLUTION INTRAVENOUS at 02:09

## 2024-09-20 RX ADMIN — ONDANSETRON 4 MG: 2 INJECTION INTRAMUSCULAR; INTRAVENOUS at 02:09

## 2024-09-20 NOTE — ANESTHESIA PREPROCEDURE EVALUATION
09/20/2024  Fred Benjamin is a 69 y.o., male.  Fred Benjamin is a 69 y.o. male     Pre-operative evaluation for Procedure(s) (LRB):  ERCP (ENDOSCOPIC RETROGRADE CHOLANGIOPANCREATOGRAPHY) (N/A)    Wt Readings from Last 1 Encounters:   09/18/24 0729 93.4 kg (206 lb)         Patient Active Problem List   Diagnosis    *Atrial fibrillation    Obesity, Class I, BMI 30-34.9    Xerostomia    Complex sleep apnea syndrome    Back pain       Past Surgical History:   Procedure Laterality Date    ABCESS DRAINAGE  06/01/2018    I&D with irrigation abcess left lower back    ATRIAL ABLATION SURGERY      x 2    BLADDER TUMOR EXCISION  03/14/2017     cysto/Cancer    CARDIAC PACEMAKER PLACEMENT      carpel tunnel Bilateral     CERVICAL FUSION      x3    CHOLECYSTECTOMY      COLONOSCOPY      ENDOSCOPIC ULTRASOUND OF UPPER GASTROINTESTINAL TRACT Left 9/13/2024    Procedure: ULTRASOUND, UPPER GI TRACT, ENDOSCOPIC;  Surgeon: Gerald Ochoa MD;  Location: [facility] ENDO;  Service: Endoscopy;  Laterality: Left;    ESOPHAGUS SURGERY      ting and then reversal    FOOT FRACTURE SURGERY      FRACTURE SURGERY      INCISION AND DRAINAGE OF ABSCESS Left 6/1/2018    Procedure: INCISION AND DRAINAGE, ABSCESS - left, lower back;  Surgeon: Irving Edouard MD;  Location: [facility] OR;  Service: Orthopedics;  Laterality: Left;    KNEE ARTHROSCOPY W/ MENISCAL REPAIR Left     ROTATOR CUFF REPAIR Left          Vital Signs Range (Last 24H):             Chemistry        Component Value Date/Time     09/19/2024 1504    K 4.8 09/19/2024 1504     09/19/2024 1504    CO2 26 09/19/2024 1504    BUN 13 09/19/2024 1504    CREATININE 1.1 09/19/2024 1504     09/19/2024 1504        Component Value Date/Time    CALCIUM 9.6 09/19/2024 1504    ALKPHOS 89 09/19/2024 1504    AST 17 09/19/2024 1504    ALT 15 09/19/2024 1504     "BILITOT 0.6 09/19/2024 1504    ESTGFRAFRICA >60 07/10/2018 1130    EGFRNONAA >60 07/10/2018 1130            Lab Results   Component Value Date    WBC 11.47 09/19/2024    HGB 13.1 (L) 09/19/2024    HCT 41.1 09/19/2024    MCV 84 09/19/2024     09/19/2024       No results for input(s): "PT", "INR", "PROTIME", "APTT" in the last 72 hours.        Pre-op Assessment    I have reviewed the Patient Summary Reports.     I have reviewed the Nursing Notes. I have reviewed the NPO Status.   I have reviewed the Medications.     Review of Systems  Anesthesia Hx:  No problems with previous Anesthesia             Denies Family Hx of Anesthesia complications.    Denies Personal Hx of Anesthesia complications.                    Social:  No Alcohol Use, Former Smoker       Hematology/Oncology:                      Current/Recent Cancer. (pancreatic cancer)            Oncology Comments: Hx of bladder tumors     Cardiovascular:    Pacemaker Hypertension, well controlled    Dysrhythmias (s/p ablation) atrial fibrillation                                   Pulmonary:        Sleep Apnea                Renal/:   Denies Chronic Renal Disease.  BPH              Hepatic/GI:     GERD (persistent reflux symptoms), poorly controlled Denies Liver Disease.            Musculoskeletal:  Arthritis   H/o cervical fusion       Spine Disorders: lumbar Chronic Pain and Degenerative disease           Neurological:      Headaches                                 Endocrine:        Obesity / BMI > 30      Physical Exam  General: Well nourished, Cooperative and Alert    Airway:  Mallampati: II   Mouth Opening: Normal  TM Distance: Normal  Tongue: Normal  Neck ROM: Normal ROM    Dental:  Dentures    Chest/Lungs:  Normal Respiratory Rate, Clear to auscultation    Heart:  Rate: Normal  Rhythm: Regular Rhythm        Anesthesia Plan  Type of Anesthesia, risks & benefits discussed:    Anesthesia Type: Gen ETT  Intra-op Monitoring Plan: Standard ASA " Monitors  Post Op Pain Control Plan: multimodal analgesia  Induction:  IV  Airway Plan: Video, Post-Induction  Informed Consent: Informed consent signed with the Patient and all parties understand the risks and agree with anesthesia plan.  All questions answered.   ASA Score: 3  Anesthesia Plan Notes:     GETA - RSI - prone to reflux  IV tylenol  Zofran Pepcid    Ready For Surgery From Anesthesia Perspective.     .

## 2024-09-20 NOTE — H&P
GASTROENTEROLOGY PRE-PROCEDURE H&P NOTE  Patient Name: Fred Benjamin  Patient MRN: [id number]  Patient : 1955    Service date: 2024    PCP: Edmund Ko DO    No chief complaint on file.      HPI: Patient is a 69 y.o. male with PMHx as below here for evaluation of stones on recent EUS w/ past failed ERCP.     Past Medical History:  Past Medical History:   Diagnosis Date    Arthritis     Atrial fibrillation     Cancer 2017    Bladder    GERD (gastroesophageal reflux disease)     Headache(784.0)     Hypertension     Pacemaker     Rash     Sleep apnea     no cpap        Past Surgical History:  Past Surgical History:   Procedure Laterality Date    ABCESS DRAINAGE  2018    I&D with irrigation abcess left lower back    ATRIAL ABLATION SURGERY      x 2    BLADDER TUMOR EXCISION  2017     cysto/Cancer    CARDIAC PACEMAKER PLACEMENT      carpel tunnel Bilateral     CERVICAL FUSION      x3    CHOLECYSTECTOMY      COLONOSCOPY      ENDOSCOPIC ULTRASOUND OF UPPER GASTROINTESTINAL TRACT Left 2024    Procedure: ULTRASOUND, UPPER GI TRACT, ENDOSCOPIC;  Surgeon: Gerald Ochoa MD;  Location: [facility];  Service: Endoscopy;  Laterality: Left;    ESOPHAGUS SURGERY      ting and then reversal    FOOT FRACTURE SURGERY      FRACTURE SURGERY      INCISION AND DRAINAGE OF ABSCESS Left 2018    Procedure: INCISION AND DRAINAGE, ABSCESS - left, lower back;  Surgeon: Irving Edouard MD;  Location: [facility] OR;  Service: Orthopedics;  Laterality: Left;    KNEE ARTHROSCOPY W/ MENISCAL REPAIR Left     ROTATOR CUFF REPAIR Left         Home Medications:  Medications Prior to Admission   Medication Sig Dispense Refill Last Dose    amLODIPine (NORVASC) 10 MG tablet TAKE 1 TABLET(S) EVERY DAY BY ORAL ROUTE FOR 90 DAYS.       ammonium lactate (LAC-HYDRIN) 12 % lotion Apply 1 application topically 2 (two) times daily. Apply to affected area  5     cyanocobalamin, vitamin B-12, (B-12 COMPLIANCE) 1,000  mcg/mL Kit Inject 1,000 mcg as directed every 28 days. 1 kit 1     diclofenac (VOLTAREN) 75 MG EC tablet Take 1 tablet (75 mg total) by mouth 2 (two) times daily with meals. 60 tablet 2     diltiaZEM (CARDIZEM CD) 240 MG 24 hr capsule Take 240 mg by mouth once daily.       ELIQUIS 5 mg Tab Take 5 mg by mouth 2 (two) times daily.        morphine (MS CONTIN) 30 MG 12 hr tablet Take 60 mg by mouth 2 (two) times daily.       oxyCODONE (ROXICODONE) 20 mg Tab immediate release tablet Take 1 tablet (20 mg total) by mouth every 6 (six) hours as needed for Pain. 28 tablet 0     oxyCODONE (ROXICODONE) 20 mg Tab immediate release tablet Take 1 tablet (20 mg total) by mouth every 12 (twelve) hours as needed for Pain. 40 tablet 0     pantoprazole (PROTONIX) 40 MG tablet Take 40 mg by mouth once daily.        sotaloL (BETAPACE) 120 MG Tab TAKE 1&1/2 TABLETS BY MOUTH TWICE A DAY       sotalol (BETAPACE) 80 MG tablet Take 180 mg by mouth 2 (two) times daily.   3     testosterone cypionate (DEPOTESTOTERONE CYPIONATE) 200 mg/mL injection INJECT 1 CC INTO THE MUSCLE EVERY 2 WEEKS 1 mL 1                Review of patient's allergies indicates:   Allergen Reactions    Cleocin [clindamycin hcl] Shortness Of Breath     Constipation    Amiodarone     Amiodarone analogues      Affected thyroid    Dabigatran etexilate     Rythmol [propafenone]      Weight loss/ nausea       Social History:   Social History     Occupational History    Not on file   Tobacco Use    Smoking status: Former     Current packs/day: 0.50     Average packs/day: 0.5 packs/day for 40.0 years (20.0 ttl pk-yrs)     Types: Cigars, Cigarettes    Smokeless tobacco: Never   Substance and Sexual Activity    Alcohol use: No    Drug use: No    Sexual activity: Not on file       Family History:   Family History   Problem Relation Name Age of Onset    Coronary artery disease Mother      Diabetes Mother      Cancer Father         Review of Systems:  A 10 point review of systems was  "performed and was normal, except as mentioned in the HPI, including constitutional, HEENT, heme, lymph, cardiovascular, respiratory, gastrointestinal, genitourinary, neurologic, endocrine, psychiatric and musculoskeletal.      OBJECTIVE:    Physical Exam:  24 Hour Vital Sign Ranges:    Most recent vitals: There were no vitals taken for this visit.   GEN: well-developed, well-nourished, awake and alert, non-toxic appearing adult  HEENT: PERRL, sclera anicteric, oral mucosa pink and moist without lesion  NECK: trachea midline; Good ROM  CV: regular rate and rhythm, no murmurs or gallops  RESP: clear to auscultation bilaterally, no wheezes, rhonci or rales  ABD: soft, non-tender, non-distended, normal bowel sounds  EXT: no swelling or edema, 2+ pulses distally  SKIN: no rashes or jaundice  PSYCH: normal affect    Labs:   Recent Labs     09/19/24  1504   WBC 11.47   MCV 84        Recent Labs     09/19/24  1504      K 4.8      CO2 26   BUN 13        No results for input(s): "ALB" in the last 72 hours.    Invalid input(s): "ALKP", "SGOT", "SGPT", "TBIL", "DBIL", "TPRO"  No results for input(s): "PT", "INR", "PTT" in the last 72 hours.      IMPRESSION / RECOMMENDATIONS:  ERCP  with interventions as warranted.   RIsks, benefits, alternatives discussed in detail regarding upcoming procedures and sedation. Some of the more common endoscopic complications include but not limited to immediate or delayed perforation, bleeding, infections, pain, inadvertent injury to surrounding tissue / organs and possible need for surgical evaluation. Patient expressed understanding, all questions answered and will proceed with procedure as planned.     Mariano DORADO SkemA III  9/20/2024  1:51 PM      "

## 2024-09-20 NOTE — ANESTHESIA POSTPROCEDURE EVALUATION
Anesthesia Post Evaluation    Patient: Fred Benjamin    Procedure(s) Performed: Procedure(s) (LRB):  ERCP (ENDOSCOPIC RETROGRADE CHOLANGIOPANCREATOGRAPHY) (N/A)    Final Anesthesia Type: general      Patient location during evaluation: PACU  Patient participation: Yes- Able to Participate  Level of consciousness: awake and alert and oriented  Post-procedure vital signs: reviewed and stable  Pain management: adequate  Airway patency: patent    PONV status at discharge: No PONV  Anesthetic complications: no      Cardiovascular status: blood pressure returned to baseline and hemodynamically stable  Respiratory status: unassisted, spontaneous ventilation and room air  Hydration status: euvolemic  Follow-up not needed.              Vitals Value Taken Time   /85 09/20/24 1541   Temp 36.3 °C (97.4 °F) 09/20/24 1520   Pulse 70 09/20/24 1541   Resp 16 09/20/24 1541   SpO2 99 % 09/20/24 1541   Vitals shown include unfiled device data.      No case tracking events are documented in the log.      Pain/Naomi Score: Naomi Score: 10 (9/20/2024  3:40 PM)

## 2024-09-20 NOTE — [employer] PATIENT INSTRUCTIONS
Discharge Summary/Instructions after an Endoscopic Procedure  Patient Name: Fred Benjamin  Patient MRN: [id number]  Patient YOB: 1955 Friday, September 20, 2024  Mariano Ferreira III, MD  RESTRICTIONS:  During your procedure today, you received medications for sedation.  These   medications may affect your judgment, balance and coordination.  Therefore,   for 24 hours, you have the following restrictions:   - DO NOT drive a car, operate machinery, make legal/financial decisions,   sign important papers or drink alcohol.    ACTIVITY:  Today: no heavy lifting, straining or running due to procedural   sedation/anesthesia.  The following day: return to full activity including work.  DIET:  Eat and drink normally unless instructed otherwise.     TREATMENT FOR COMMON SIDE EFFECTS:  - Mild abdominal pain, nausea, belching, bloating or excessive gas:  rest,   eat lightly and use a heating pad.  - Sore Throat: treat with throat lozenges and/or gargle with warm salt   water.  - Because air was used during the procedure, expelling large amounts of air   from your rectum or belching is normal.  - If a bowel prep was taken, you may not have a bowel movement for 1-3 days.    This is normal.  SYMPTOMS TO WATCH FOR AND REPORT TO YOUR PHYSICIAN:  1. Abdominal pain or bloating, other than gas cramps.  2. Chest pain.  3. Back pain.  4. Signs of infection such as: chills or fever occurring within 24 hours   after the procedure.  5. Rectal bleeding, which would show as bright red, maroon, or black stools.   (A tablespoon of blood from the rectum is not serious, especially if   hemorrhoids are present.)  6. Vomiting.  7. Weakness or dizziness.  GO DIRECTLY TO THE NEAREST EMERGENCY ROOM IF YOU HAVE ANY OF THE FOLLOWING:      Difficulty breathing              Chills and/or fever over 101 F   Persistent vomiting and/or vomiting blood   Severe abdominal pain   Severe chest pain   Black, tarry stools   Bleeding- more than one  tablespoon   Any other symptom or condition that you feel may need urgent attention  Your doctor recommends these additional instructions:  If any biopsies were taken, your doctors clinic will contact you in 1 to 2   weeks with any results.  - Advance diet as tolerated.   - Continue present medications.   - Patient has a contact number available for emergencies.  The signs and   symptoms of potential delayed complications were discussed with the   patient.  Return to normal activities tomorrow.  Written discharge   instructions were provided to the patient.   - Discharge patient to home (with escort).   - F/u w/ surgery and oncology for likely surgically resectable pancreatic   Ca  For questions, problems or results please call your physician - Mariano Ferreira III, MD at Work:  [phone].  [facility], EMERGENCY ROOM PHONE NUMBER: [phone]  IF A COMPLICATION OR EMERGENCY SITUATION ARISES AND YOU ARE UNABLE TO REACH   YOUR PHYSICIAN - GO DIRECTLY TO THE EMERGENCY ROOM.  Mariano Ferreira III, MD  9/20/2024 3:20:10 PM  This report has been verified and signed electronically.  Dear patient,  As a result of recent federal legislation (The Federal Cures Act), you may   receive lab or pathology results from your procedure in your [facility]   account before your physician is able to contact you. Your physician or   their representative will relay the results to you with their   recommendations at their soonest availability.  Thank you,  [employer]

## 2024-09-20 NOTE — TRANSFER OF CARE
Anesthesia Transfer of Care Note    Patient: Fred Benjamin    Procedure(s) Performed: Procedure(s) (LRB):  ERCP (ENDOSCOPIC RETROGRADE CHOLANGIOPANCREATOGRAPHY) (N/A)    Patient location: PACU    Anesthesia Type: general    Transport from OR: Transported from OR on room air with adequate spontaneous ventilation    Post pain: adequate analgesia    Post assessment: no apparent anesthetic complications and tolerated procedure well    Post vital signs: stable    Level of consciousness: awake and alert    Nausea/Vomiting: no nausea/vomiting    Complications: none    Transfer of care protocol was followed    Last vitals: Visit Vitals  BP (P) 117/79   Pulse (P) 71   Temp (P) 36.4 °C (97.5 °F)   Resp (P) 16   SpO2 (P) 97%

## 2024-09-20 NOTE — ANESTHESIA PROCEDURE NOTES
Intubation    Date/Time: 9/20/2024 2:31 PM    Performed by: Carrie Muniz CRNA  Authorized by: Germán Khanna MD    Intubation:     Induction:  Intravenous    Intubated:  Postinduction    Mask Ventilation:  Easy mask    Attempts:  1    Attempted By:  CRNA    Method of Intubation:  Video laryngoscopy    Blade:  Ring 4    Laryngeal View Grade: Grade I - full view of cords      Difficult Airway Encountered?: No      Complications:  None    Airway Device:  Oral endotracheal tube    Airway Device Size:  7.5    Style/Cuff Inflation:  Cuffed    Secured at:  The lips    Placement Verified By:  Capnometry    Complicating Factors:  None    Findings Post-Intubation:  BS equal bilateral and atraumatic/condition of teeth unchanged

## 2024-09-24 DIAGNOSIS — C25.9 PANCREATIC ADENOCARCINOMA: Primary | ICD-10-CM

## 2024-09-24 NOTE — PROGRESS NOTES
Ahmet is a 70yo M with what is essentially an incidental diagnosis of pancreatic tail adenocarcinoma. He has hx cholecystectomy 7 years ago but presented recently with abdominal pain and choledocholithiasis. This was treated by EUS/ERCP effectively, but on this w/u thorough EUS demonstrated an incidental pancreatic tail mass which was biopsied and consistent with adenocarcinoma. He has recovered well from his ERCP.    No fam hx pancreaticobiliary cancer  Chronic opioid use due to back pain for 20+ years with secondary constipation    CT C/A/P 9/19/2024:  IMPRESSION     There is hyper dense appearance of the very tip of the tail the pancreas questioning high density mass.  May represent hemorrhage in this patient with history of recent biopsy.  Large amount of fat stranding and fluid around the tail the pancreas may be postoperative, pancreatitis, or neoplastic spread in this patient with history of pancreatic cancer.  Biliary duct dilatation with faint density within common duct.  Additional findings as detailed above including diffuse fatty infiltration of the liver.  Diverticulosis without CT findings of acute diverticulitis             EUS 9/2024:  Impression:            - Limited views of the esophagus, stomach, and                          duodenum were normal.                          - Four stones were visualized endosonographically                          in the common bile duct.                          - A mass was identified in the pancreatic tail.                          Fine needle biopsy performed.                          - A cystic lesion was seen in the pancreatic body.                          The pancreatic parenchyma was normal. The                          pancreatic duct was traced from the papilla to the                          tail of the pancreas and appeared normal. It                          measured 2 mm in diameter in the head of the                          pancreas and tapered  distally.                          The gallbladder was not visualized.                          Limited views of the left lobe of the liver                          appeared normal.                          Limited views of the abdominal aorta, portal vein,                          celiac artery, and splenic vessels appeared normal.                          There was no intra-abdominal lymphadenopathy.                          The mediastinum was unremarkable. There was no                          mediastinal lymphadenopathy. Vascular structures                          all appeared normal.                          The esophagus, stomach, and duodenal stations were                          all viewed.           Path:  PANCREATIC TAIL, MASS, FINE NEEDLE ASPIRATE BIOPSY (CELL BLOCK):     - ADENOCARCINOMA._______________________________________     ERCP 9/20/2024:  Impression:            - The entire main bile duct was severely dilated,                          with a stone causing an obstruction.                          - The patient has had a cholecystectomy.                          - Choledocholithiasis was found.                          - Ampullary dilation and biliary sphincterotomy                          was performed.                          - The biliary tree was swept.     CA 19-9: 16.9 (wnl)    A/P    70yo M with incidental finding of a pancreatic tail lesion discovered on w/u for benign choledocholithiasis. He is set to see Dr. Suero this week as well. We discussed the diagnosis of pancreatic adenocarcinoma and the treatment which includes systemic therapy and surgical resection. He has a good bit of stranding almost like a localize pancreatitis on his CT, but no evidence of metastatic disease in the chest or liver. I would favor a short(mariaelena) course of systemic therapy ie 4 cycles followed by restaging and distal pancreatectomy here. I suspect (and hope) that the stranding around the TOP is  inflammatory and will resolve with some systemic therapy and time.    I spent 60 minutes with Mr. Benjamin, with >50% of time spent face to face and additional time preparing to see the patient (eg, review of tests), obtaining and/or reviewing separately obtained history, documenting clinical information in the electronic or other health record, independently interpreting results (not separately reported) and communicating results to the patient/family/caregiver, or Care coordination (not separately reported).       Matthew Butler MD  Upper GI / Hepatobiliary Surgical Oncology  [facility] [place], [place]  Office: [phone]  Fax: [phone]

## 2024-09-25 ENCOUNTER — OFFICE VISIT (OUTPATIENT)
Dept: SURGERY | Facility: CLINIC | Age: 69
End: 2024-09-25
Payer: MEDICARE

## 2024-09-25 VITALS
HEART RATE: 91 BPM | WEIGHT: 205.69 LBS | SYSTOLIC BLOOD PRESSURE: 116 MMHG | DIASTOLIC BLOOD PRESSURE: 86 MMHG | BODY MASS INDEX: 27.9 KG/M2 | OXYGEN SATURATION: 99 %

## 2024-09-25 DIAGNOSIS — C25.9 PANCREATIC ADENOCARCINOMA: ICD-10-CM

## 2024-09-25 PROCEDURE — 99999 PR PBB SHADOW E&M-EST. PATIENT-LVL III: CPT | Mod: PBBFAC,,, | Performed by: SURGERY

## 2024-09-25 PROCEDURE — 99213 OFFICE O/P EST LOW 20 MIN: CPT | Mod: PBBFAC | Performed by: SURGERY

## 2024-09-25 PROCEDURE — 99205 OFFICE O/P NEW HI 60 MIN: CPT | Mod: S$PBB,,, | Performed by: SURGERY

## 2024-09-25 NOTE — NURSING
"[facility]      GI MULTIDISCIPLINARY TUMOR BOARD      Date: 9/18/24  MRN: [id number]    Site:   Cancer Type: Pancreatic cancer     Cancer Site: pancreatic tail       Cancer Staging Complete: No       Presenting Hospital / Clinic: [facility]     Virtual Tumor Board Conference: In person       PRESENTER:   Presenter: Matthew Ochoa MD     Specialties Present: Research; Gastrointestinal; Hematology; Radiation Oncology; Medical Oncology; Surgical Oncology; Pathology; Navigation; Nuclear Medicine; Radiology; Integrative Oncology; Genetics       PATIENT SUMMARY:   Fred Benjamin is a 69 y.o. male who presents c/o anal "cyst". Began after hemorrhoid surgery 1 year ago with severe bout of constipation. Has had intermittent drainage from perianal area. Some bleeding, pain with bms. Stool generally loose. Also with c/o nausea, emesis after oral intake. H/o Nissen surgery with ultimately requing a reversal. No abdominal pain.     Background Information  Patient Status: a new patient  Reason for Consultation: Initial Presentation  Biopsy Date: 09/13/24  Biopsy Results: Cancer  Treatment to Date: None         BOARD RECOMMENDATIONS:   Recommended Plan (General)  Recommended Plan: Surgery; Imaging  Recommended Plan Note: Retrieve imaging from [place], see Dr. Butler for surgical consult, see Dr. Suero for treatment consult         Consult: Surgery, Hem/Onc, Rad/Onc, GI, and Radiology    Cancer Staging   No matching staging information was found for the patient.          [facility] Treatment Guidelines reviewed and care planned is consistent with guidelines.   (i.e., NCCN, NCI, PD, ACO, AUA, etc.)    PRESENTATION AT CANCER CONFERENCE:   Presentation at Cancer Conference: Prospective       CLINICAL TRIAL ELIGIBILITY:   Clinical Trial Eligibility  Clinical Trial Eligibility: Potentially eligible  Clinical Trial Note: Potentially eligible " for Dr. Butler's Early Detection Pancreatic Cancer: Prospective Study U01       Gerald Ochoa MD Bolton, Nathan, MD

## 2024-09-26 ENCOUNTER — TELEPHONE (OUTPATIENT)
Dept: SURGERY | Facility: CLINIC | Age: 69
End: 2024-09-26
Payer: MEDICARE

## 2024-09-26 ENCOUNTER — TELEPHONE (OUTPATIENT)
Dept: HEMATOLOGY/ONCOLOGY | Facility: CLINIC | Age: 69
End: 2024-09-26
Payer: MEDICARE

## 2024-09-26 NOTE — NURSING
Spoke with patient.  He canceled his NP appt with Dr. Suero for pancreatic cancer via portal.  Called to see if he would like to reschedule, pt stated he is going to reschedule, but is not ready to do so at this time. He stated he would be in touch when he was ready.  Pt did see [place]. Butler [place] [facility] [place]. Will continue to follow.

## 2024-09-26 NOTE — TELEPHONE ENCOUNTER
Received incoming call from Adelita at [facility]. She will powershare CT from 6/2023 and fax report. Fax number provided.

## 2024-09-26 NOTE — TELEPHONE ENCOUNTER
----- Message from Usha Mott sent at 9/26/2024  8:02 AM CDT -----  Contact: Self  Type: Needs Medical Advice    Who Called:  Patient  What is this regarding?:  He is needing to cancel his apt that is for tomorrow.  Best Call Back Number:  [phone]  Additional Information:  Please call the patient back at the phone number listed above to advise. Thank you!

## 2024-09-26 NOTE — LETTER
[facility] - [facility]  [address] [place] [id number]  Phone: [phone] September 26, 2024     Patient: Fred Benjamin   YOB: 1955   Date of Visit: 9/26/2024       To Whom It May Concern:    Per Dr. Matthew Butler's request, please send CD with  CT A/P images for DOS 6/26/2023 for continuity of care.    For your convenience, please use the [employer] ex account number [id number] and mail to the address below:    [facility] [address] [place] [address]  Attention: Karis Rousseau  [address] [place] [address]      If you have any questions or concerns, please don't hesitate to contact my office.    Sincerely,    KAYLEE BeaversN,RN  RN Navigator   Upper GI/Hepatobiliary Surgical Oncology  [facility] [address] [place] [address]  Office:   [phone]  Fax:        [phone]

## 2024-09-27 NOTE — TELEPHONE ENCOUNTER
Request for [employer] import faxed to [facility] film library. Successful fax transmission received. Scanned to media.

## 2024-09-30 ENCOUNTER — TELEPHONE (OUTPATIENT)
Dept: SURGERY | Facility: CLINIC | Age: 69
End: 2024-09-30
Payer: MEDICARE

## 2024-09-30 NOTE — TELEPHONE ENCOUNTER
----- Message from ANALY Orellana sent at 9/30/2024  9:07 AM CDT -----  Karis,  Please reach out to  Cassidy regarding some surgery questions he has.    Specifically he wants to know if robotic is an option and what type of procedure it is.    Thank you,    Michelle

## 2024-09-30 NOTE — TELEPHONE ENCOUNTER
Call placed to pt. Pt inquiring if Dr. Butler does robotic surgeries. Informed him Dr. Butler does do robotic surgeries as well as open cases and the approach will be discussed with him during his restaging appt. Pt stated thank you and disconnected the call.

## 2024-10-01 ENCOUNTER — TELEPHONE (OUTPATIENT)
Dept: HEMATOLOGY/ONCOLOGY | Facility: CLINIC | Age: 69
End: 2024-10-01
Payer: MEDICARE

## 2024-10-01 ENCOUNTER — DOCUMENTATION ONLY (OUTPATIENT)
Dept: SURGERY | Facility: CLINIC | Age: 69
End: 2024-10-01
Payer: MEDICARE

## 2024-10-01 NOTE — TELEPHONE ENCOUNTER
----- Message from Alberta sent at 10/1/2024  8:35 AM CDT -----  Type: Needs Medical Advice  Who Called:  Patient   Symptoms (please be specific):    How long has patient had these symptoms:    Pharmacy name and phone #:    Best Call Back Number: [phone]  Additional Information: Patient is requesting a call back to reschedule a NP appt. with Dr. Suero that he had to cancel.

## 2024-10-01 NOTE — NURSING
Returned patients call, reviewed chart.  Pt called to reschedule canceled new patient appointment with Dr. Suero for pancreatic cancer.  10/2 appointment offered, patient declined and requested end of the following week. Pt was seen by Dr. Butler for surgery consult at [facility].  Pt was scheduled 10/11 per patient request, pt verbalized agreement to time/date/location. All questions and concerns addressed at this time. Will continue to follow.

## 2024-10-07 NOTE — PROGRESS NOTES
Dr. Suero, Mr. Benjamin appears potentially eligible for Dr. Butler's prospective Early Detection of Pancreatic Cancer study.    PATIENT: Ferd Benjamin  MRN: [id number]  DATE: 10/11/2024      Diagnosis:   1. Pancreatic adenocarcinoma    2. Pulmonary nodules    3. Atrial fibrillation, unspecified type    4. Hypertension, unspecified type    5. Gastroesophageal reflux disease, unspecified whether esophagitis present    6. B12 deficiency        Chief Complaint: Pancreatic Cancer (New pt; establishing care )      Oncologic History:      Oncologic History     Oncologic Treatment     Pathology           Subjective:    Initial History: Mr. Benjamin is a 69 y.o. male with Arthritis, A-fib, GERD, HTN, BHUMIKA who presents for pancreatic cancer.  Pt initially underwetn US abdomen 8/02/24 for work up of abdominal pain.  US showed mild intrahepatic ductal dilatation.  PT underwent EUS on 9/13/24 showing for stones in the common bile duct; mass in the pancreatic tail which was biopsied; cystic lesion in the pancreatic body.  Biopsy returned positive for adenocarcinoma.  The patient was discussed at tumor board on 09/18/2024 with recommendation for referral to Surgical Oncology and Medical Oncology.  The patient underwent CT chest, abdomen, and pelvis on 09/1924 showing a small pleural plaque of platelike atelectasis anteriorly in the right hemithorax; intra and extrahepatic biliary duct dilatation; faint density within the common duct which could represent faintly calcified stones, sludge, or debris; large amount of pancreatic fat stranding and fluid around the tail of the pancreas.  Patient underwent ERCP on 09/20/2024 showing dilatation of the entire main bile duct with stone causing obstruction.  Patient underwent ampullary dilatation and biliary sphincterotomy with the biliary tree swept.  Patient met with surgical oncologist Dr. Butler on 09/25/2024 whom recommended 4 cycles chemotherapy followed by restaging and consideration  for distal pancreatectomy.  The patient went to [facility] on 10/03/2024 and underwent CT chest, abdomen, and pelvis showing Variable sized pulmonary nodules predominantly within the left lung with a dominant lesion having irregular border measuring 12 mm in the next most prominent nodule measuring 7 mm; mild emphysema; bilobed fluid or 6 appearing lesion in the pancreatic tail; and chronic dilatation of the common bile duct.  The patient met with medical oncologist Dr. Olson on 10/03/2024 whom recommended 12 cycles of FOLFIRINOX and perioperative management.    Currently the patient endorses night sweats, shortness breath with exertion, occasional swelling in his legs, abdominal pain, nausea, no vomiting once last week, and occasional headaches.  Patient also endorses arthritis in his right foot, knees, hips, shoulders, and neck.  The patient denies CP, cough, constipation, diarrhea.  The patient denies fever, chills, night sweats, weight loss, new lumps or bumps, easy bruising or bleeding.    Past Medical History:   Past Medical History:   Diagnosis Date    Arthritis     Atrial fibrillation     Cancer 03/2017    Bladder    GERD (gastroesophageal reflux disease)     Headache(784.0)     Hypertension     Pacemaker     Rash     Sleep apnea     no cpap       Past Surgical HIstory:   Past Surgical History:   Procedure Laterality Date    ABCESS DRAINAGE  06/01/2018    I&D with irrigation abcess left lower back    ATRIAL ABLATION SURGERY      x 2    BLADDER TUMOR EXCISION  03/14/2017     cysto/Cancer    CARDIAC PACEMAKER PLACEMENT      carpel tunnel Bilateral     CERVICAL FUSION      x3    CHOLECYSTECTOMY      COLONOSCOPY      ENDOSCOPIC ULTRASOUND OF UPPER GASTROINTESTINAL TRACT Left 9/13/2024    Procedure: ULTRASOUND, UPPER GI TRACT, ENDOSCOPIC;  Surgeon: Gerald Ochoa MD;  Location: [facility];  Service: Endoscopy;  Laterality: Left;    ERCP N/A 9/20/2024    Procedure: ERCP (ENDOSCOPIC RETROGRADE  CHOLANGIOPANCREATOGRAPHY);  Surgeon: Mariano Ferreira III, MD;  Location: [facility] ENDO;  Service: Endoscopy;  Laterality: N/A;    ESOPHAGUS SURGERY      ting and then reversal    FOOT FRACTURE SURGERY      FRACTURE SURGERY      INCISION AND DRAINAGE OF ABSCESS Left 6/1/2018    Procedure: INCISION AND DRAINAGE, ABSCESS - left, lower back;  Surgeon: Irving Edouard MD;  Location: [facility] OR;  Service: Orthopedics;  Laterality: Left;    KNEE ARTHROSCOPY W/ MENISCAL REPAIR Left     ROTATOR CUFF REPAIR Left        Family History:   Family History   Problem Relation Name Age of Onset    Coronary artery disease Mother      Diabetes Mother      Cancer Father          Lymphoma       Social History:  reports that he has quit smoking. His smoking use included cigars and cigarettes. He has a 20 pack-year smoking history. He has never used smokeless tobacco. He reports that he does not drink alcohol and does not use drugs.    Allergies:  Review of patient's allergies indicates:   Allergen Reactions    Cleocin [clindamycin hcl] Shortness Of Breath     Constipation    Amiodarone     Amiodarone analogues      Affected thyroid    Dabigatran etexilate     Rythmol [propafenone]      Weight loss/ nausea       Medications:  Current Outpatient Medications   Medication Sig Dispense Refill    amLODIPine (NORVASC) 10 MG tablet TAKE 1 TABLET(S) EVERY DAY BY ORAL ROUTE FOR 90 DAYS.      ammonium lactate (LAC-HYDRIN) 12 % lotion Apply 1 application topically 2 (two) times daily. Apply to affected area  5    ELIQUIS 5 mg Tab Take 5 mg by mouth 2 (two) times daily.       morphine (MS CONTIN) 30 MG 12 hr tablet Take 60 mg by mouth 2 (two) times daily.      oxyCODONE (ROXICODONE) 20 mg Tab immediate release tablet Take 1 tablet (20 mg total) by mouth every 6 (six) hours as needed for Pain. 28 tablet 0    sotaloL (BETAPACE) 120 MG Tab Take 160 mg by mouth once daily.      cyanocobalamin, vitamin B-12, (B-12 COMPLIANCE) 1,000 mcg/mL Kit Inject 1,000  mcg as directed every 28 days. (Patient not taking: Reported on 10/11/2024) 1 kit 1    diclofenac (VOLTAREN) 75 MG EC tablet Take 1 tablet (75 mg total) by mouth 2 (two) times daily with meals. (Patient not taking: Reported on 10/11/2024) 60 tablet 2    diltiaZEM (CARDIZEM CD) 240 MG 24 hr capsule Take 240 mg by mouth once daily. (Patient not taking: Reported on 10/11/2024)      oxyCODONE (ROXICODONE) 20 mg Tab immediate release tablet Take 1 tablet (20 mg total) by mouth every 12 (twelve) hours as needed for Pain. (Patient not taking: Reported on 10/11/2024) 40 tablet 0    pantoprazole (PROTONIX) 40 MG tablet Take 40 mg by mouth once daily.  (Patient not taking: Reported on 10/11/2024)      sotalol (BETAPACE) 80 MG tablet Take 180 mg by mouth 2 (two) times daily.  (Patient not taking: Reported on 10/11/2024)  3    testosterone cypionate (DEPOTESTOTERONE CYPIONATE) 200 mg/mL injection INJECT 1 CC INTO THE MUSCLE EVERY 2 WEEKS (Patient not taking: Reported on 10/11/2024) 1 mL 1     No current facility-administered medications for this visit.       Review of Systems   Constitutional:  Positive for diaphoresis (at night). Negative for appetite change, chills, fever and unexpected weight change.   HENT:  Negative for sore throat and trouble swallowing.    Eyes:  Negative for photophobia and visual disturbance.   Respiratory:  Positive for shortness of breath (with exertion). Negative for cough and chest tightness.    Cardiovascular:  Positive for leg swelling (right leg). Negative for chest pain and palpitations.   Gastrointestinal:  Positive for abdominal pain, nausea and vomiting (once a week). Negative for constipation and diarrhea.   Endocrine: Negative for cold intolerance and heat intolerance.   Genitourinary:  Negative for difficulty urinating, dysuria and hematuria.   Musculoskeletal:  Positive for arthralgias (right foot, knees, hips, shoulders) and neck pain. Negative for back pain and myalgias.   Skin:   "Negative for color change and rash.   Neurological:  Positive for headaches (occipital). Negative for dizziness, light-headedness and numbness.   Hematological:  Negative for adenopathy. Does not bruise/bleed easily.       ECOG Performance Status: 1   Objective:      Vitals:   Vitals:    10/11/24 1022   BP: 98/80   Pulse: 76   Resp: 18   Temp: 96.5 °F (35.8 °C)   TempSrc: Temporal   SpO2: 95%   Weight: 94.2 kg (207 lb 10.8 oz)   Height: 5' 11" (1.803 m)       Physical Exam  Constitutional:       General: He is not in acute distress.     Appearance: He is well-developed. He is not diaphoretic.   HENT:      Head: Normocephalic and atraumatic.   Eyes:      General: No scleral icterus.        Right eye: No discharge.         Left eye: No discharge.   Cardiovascular:      Rate and Rhythm: Normal rate and regular rhythm.      Heart sounds: Normal heart sounds. No murmur heard.     No friction rub. No gallop.   Pulmonary:      Effort: Pulmonary effort is normal. No respiratory distress.      Breath sounds: Normal breath sounds. No wheezing or rales.   Chest:      Chest wall: No tenderness.   Abdominal:      General: Bowel sounds are normal. There is no distension.      Palpations: Abdomen is soft. There is no mass.      Tenderness: There is no abdominal tenderness. There is no rebound.   Skin:     General: Skin is warm and dry.      Findings: No erythema or rash.   Neurological:      Mental Status: He is alert and oriented to person, place, and time.   Psychiatric:         Behavior: Behavior normal.         Laboratory Data:  No visits with results within 1 Week(s) from this visit.   Latest known visit with results is:   Lab Visit on 09/19/2024   Component Date Value Ref Range Status    WBC 09/19/2024 11.47  3.90 - 12.70 K/uL Final    RBC 09/19/2024 4.89  4.60 - 6.20 M/uL Final    Hemoglobin 09/19/2024 13.1 (L)  14.0 - 18.0 g/dL Final    Hematocrit 09/19/2024 41.1  40.0 - 54.0 % Final    MCV 09/19/2024 84  82 - 98 fL Final "    MCH 09/19/2024 26.8 (L)  27.0 - 31.0 pg Final    MCHC 09/19/2024 31.9 (L)  32.0 - 36.0 g/dL Final    RDW 09/19/2024 14.2  11.5 - 14.5 % Final    Platelets 09/19/2024 279  150 - 450 K/uL Final    MPV 09/19/2024 9.0 (L)  9.2 - 12.9 fL Final    Immature Granulocytes 09/19/2024 0.4  0.0 - 0.5 % Final    Gran # (ANC) 09/19/2024 7.9 (H)  1.8 - 7.7 K/uL Final    Immature Grans (Abs) 09/19/2024 0.05 (H)  0.00 - 0.04 K/uL Final    Comment: Mild elevation in immature granulocytes is non specific and   can be seen in a variety of conditions including stress response,   acute inflammation, trauma and pregnancy. Correlation with other   laboratory and clinical findings is essential.      Lymph # 09/19/2024 2.3  1.0 - 4.8 K/uL Final    Mono # 09/19/2024 0.9  0.3 - 1.0 K/uL Final    Eos # 09/19/2024 0.2  0.0 - 0.5 K/uL Final    Baso # 09/19/2024 0.07  0.00 - 0.20 K/uL Final    nRBC 09/19/2024 0  0 /100 WBC Final    Gran % 09/19/2024 68.7  38.0 - 73.0 % Final    Lymph % 09/19/2024 20.3  18.0 - 48.0 % Final    Mono % 09/19/2024 8.1  4.0 - 15.0 % Final    Eosinophil % 09/19/2024 1.9  0.0 - 8.0 % Final    Basophil % 09/19/2024 0.6  0.0 - 1.9 % Final    Differential Method 09/19/2024 Automated   Final    Sodium 09/19/2024 139  136 - 145 mmol/L Final    Potassium 09/19/2024 4.8  3.5 - 5.1 mmol/L Final    Chloride 09/19/2024 103  95 - 110 mmol/L Final    CO2 09/19/2024 26  23 - 29 mmol/L Final    Glucose 09/19/2024 108  70 - 110 mg/dL Final    BUN 09/19/2024 13  8 - 23 mg/dL Final    Creatinine 09/19/2024 1.1  0.5 - 1.4 mg/dL Final    Calcium 09/19/2024 9.6  8.7 - 10.5 mg/dL Final    Total Protein 09/19/2024 8.1  6.0 - 8.4 g/dL Final    Albumin 09/19/2024 3.2 (L)  3.5 - 5.2 g/dL Final    Total Bilirubin 09/19/2024 0.6  0.1 - 1.0 mg/dL Final    Comment: For infants and newborns, interpretation of results should be based  on gestational age, weight and in agreement with clinical  observations.    Premature Infant recommended reference  ranges:  Up to 24 hours.............<8.0 mg/dL  Up to 48 hours............<12.0 mg/dL  3-5 days..................<15.0 mg/dL  6-29 days.................<15.0 mg/dL      Alkaline Phosphatase 09/19/2024 89  55 - 135 U/L Final    AST 09/19/2024 17  10 - 40 U/L Final    ALT 09/19/2024 15  10 - 44 U/L Final    eGFR 09/19/2024 >60  >60 mL/min/1.73 m^2 Final    Anion Gap 09/19/2024 10  8 - 16 mmol/L Final    CA 19-9 09/19/2024 16.9  0.0 - 40.0 U/mL Final    Comment: The testing method is a chemiluminescent microparticle immunoassay   manufactured by [employer] and performed on the [employer]   or   [employer] system. Values obtained with different assay manufacturers   for   methods may be different and cannot be used interchangeably.           Imaging:   CT C/A/P 9/19/24  Chest; the visualized structures at the base of the neck are unremarkable appearance. There is anterior chest wall cardiac pacemaker with pacing leads. There are vascular calcifications including coronary artery calcifications. There is no significant hilar or mediastinal adenopathy. There is no pleural or pericardial effusion. There are emphysematous changes in the lungs. Mild peribronchial thickening. Mild dependent hypoventilatory change. Small pleural plaque or platelike atelectasis anteriorly in the right hemithorax for example axial series 102, image 88. Degenerative changes in the osseous structures without obvious aggressive appearing osseous lesion.     Abdomen and pelvis; prior cholecystectomy. There is intra and extrahepatic biliary duct dilatation. Common duct measures 1.6 cm. There is some faint density within the common duct which could represent faintly calcified stones, sludge, debris or less likely mass. For example coronal series 604, images 64-66, axial series 3 images 136 through 139. Common duct appears dilated down to the ampulla with density appearing within the distal most common duct as well     Pancreas; much of the  pancreas appears fatty replaced. There is large amount of peripancreatic fat stranding and fluid around the tail the pancreas. With history of recent biopsy this could represent post biopsy inflammatory change with differential including pancreatitis in neoplastic spread around the tail the pancreas. The very tip of the tail the pancreas is more hyperdense for example axial series 2, image 64 measuring approximately 1.7 by 0.7 cm.     There is no fat stranding or encasement around the celiac axis or superior mesenteric artery. There is no significant adenopathy.     Renal enhancement is symmetrical. Mild renal scarring and cortical thinning. No hydronephrosis. Tiny subcentimeter hypodensity right kidney suggesting cyst.     Of the abdominal aorta no aneurysm     Adrenal glands unremarkable appearance     Spleen not enlarged. Tiny hypodensity suggesting cyst     There is diverticulosis without CT findings of acute diverticulitis. Appendix not seen but no abnormal appendix is seen or inflammatory changes appendiceal region appearance suggest prior appendectomy. Urinary bladder mildly distended at time of the exam and as visualized unremarkable appearance. Prostate gland does not appear enlarged. Small fat containing right inguinal hernia. Osseous structures show degenerative changes without obvious aggressive appearing osseous lesion    CT C/A/P 10/03/24  CHEST FINDINGS: The current study is the patient's baseline examination for the chest.   Lines, Tubes, Devices:  Dual-lead pacemaker with tips in right atrium and right ventricle.     Lungs and Pleura:   *  Relatively mild emphysematous changes are identified within the upper lung fields.   *  There are variably sized pulmonary nodules identified predominantly within the left lung. These are most prominently seen in the left lower lobe with the dominant lesion having an irregular border and measuring 12 mm image 65 series 11. The next most prominent is 7 mm also  irregular image 80 series 11. Others are sub-5 mm in size. Differential, given the relative predominance in left lower lobe, would be for an infectious or inflammatory process. Aspiration pneumonia cannot be excluded. Clinical correlation is needed. Another possibility would be that one or more of these could be metastatic disease to lungs.   *  Other examples of smaller nodules seen on images 49, 55, 96 of series 11.   No pleural effusion.     Cardiomediastinum:     Heart: Atherosclerotic coronary artery calcifications.       Thyroid:   Unusual appearance of right thyroid image 12 series 10 may be indicative of an exophytic nodule.       Lymph Nodes: No adenopathy.     ABDOMEN AND PELVIS FINDINGS:     Hepatobiliary:   *  Sub-5 mm subcapsular segment 5-6 probably enhancing focus image 45 series 4.   *  No definite evidence of liver metastases   *  There is mild/moderate biliary dilatation and the common bile duct can be followed to the level the pancreatic head where it terminates just superior to the major papilla that empties into the transverse duodenum. Please see image 55 of series 601 that suggest possibly cut off. The patient is also status post cholecystectomy. The findings appear to be relatively stable from 06/26/2023 there may be mildly prominent enhancement in this region on the study of 10/03/2022 before. Correlation would be advised with findings on outside endoscopic ultrasound exam.     Spleen: No splenomegaly. Nonspecific sub-5 mm hypodensity image 131/4.     Pancreas:   -----Solid lesion(s):   *  As noted above, there appears to be relatively abrupt termination of the common bile duct in the pancreatic head and relatively prominent enhancement at that site. This is a stable finding from the prior examination which would argue against a true lesion and could represent an abnormality of the ampulla. Advise correlation with the findings on the outside endoscopic ultrasound. Please note this is not the  site of biopsy-proven cancer.   *  Please see below what appears to be a bilobed cystic type lesion that appears to be exophytic to the pancreatic tail when compared to the anatomy of the tail on the 06/26/2023 study.   *  The pancreatic tail solid lesion therefore cannot be definitely identified but the findings together appear to suggest interim inflammatory process that may be currently masking the underlying tumor. Therefore advise obtaining and evaluating the outside 09/19/2024 CT that preceded the endoscopic ultrasound study and associated biopsy   -----Main pancreatic duct: Normal   -----Vessel involvement : No definite involvement of vessels   -----Cystic lesions:   *  In the pancreatic tail is a peripherally enhancing bilobed fluid density lesion image 38 series 4 measuring 25 mm that extends exophytically from the tail. Based on comparison with the outside CT of 06/26/2023, this appears to be extrapancreatic and could be the sequela of an inflammatory episode in this patient who had prior to the study and outside endoscopic ultrasound examination and biopsy. This therefore could represent some form of sequela of inflammation including evolving pseudocyst rather than a true cystic lesion of pancreatic origin. Please also note that the outside endoscopic ultrasound reportedly showed a mass in the tail measuring smaller in size.   *  There is prominent enhancement seen medial to this finding in the pancreatic tail image 36 of series 4. Of note this is seen on the prior 2023 study and rather than an unusual manifestation of tumor, could represent normal variant of enhancement of benign parenchyma,   *  Findings compatible with likely cystic lesion at the junction of the pancreatic body and tail measuring 11 mm image 34 is difficult to identify on the prior study.   *  Outside pathology and outside history indicates endoscopic ultrasound identified a 14 mm pancreatic tail mass. Correlation would be advised with  the outside CT of 09/19/2024, obtained prior to biopsy. No definite abnormality is seen in the pancreatic tail on the 06/26/2023 study     ______________________________         Adrenal Glands:  No masses.     Kidneys, Ureters, Bladder: No hydronephrosis.  No suspicious renal lesion. No bladder mass.     Gastrointestinal Tract: No obstruction. Colonic diverticuli     Pelvic Organs: No pelvic mass.     Peritoneum/Retroperitoneum:  No ascites. Anterior abdominal wall surgery     Lymph Nodes:  No lymphadenopathy.       Vessels: Findings of atherosclerotic disease in abdominopelvic vessels.       MUSCULOSKELETAL FINDINGS:     No suspicious skeletal lesions.       Assessment:       1. Pancreatic adenocarcinoma    2. Pulmonary nodules    3. Atrial fibrillation, unspecified type    4. Hypertension, unspecified type    5. Gastroesophageal reflux disease, unspecified whether esophagitis present    6. B12 deficiency           Plan:     Adenocarcinoma of the Pancreas - patient with adenocarcinoma in the tail of the pancreas status post biopsy 09/13/2024  -CT chest, abdomen, and pelvis on 10/03/2024 showed a lesion in the tail of the pancreas as well as multiple pulmonary nodules with 1 nodule measuring at least 12 mm  -Will obtain PET-CT  -Pt seen by Dr Butler in surgical oncology at [facility] 09/25/2024 whom recommended 4 cycles of chemo followed by restaging  -Patient seen by  at [facility] on 10/03/2024 recommending perioperative FOLFIRINOX with elimination of bolus fluorouracil and leucovorin  -we will send blood for Tempus testing, pharmacogenomic weeks, [facility]   -Chemo class  Visit today included increased complexity associated with the care of the episodic problem (chemotherapy) addressed and managing the longitudinal care of the patient due to the serious and/or complex managed problem(s) pancreatic cancer.    Pulmonary Nodules - Seen  on Ct chest 10/03/24 with largest noduel in left lung  -will assess on PET/CT    A-fib - pt on eliquis and diltiazem  -Cardiology managing  -Will monitor    HTN - pt on amlodipine, sotalol, diltiazem  -BP on lower side  -Will monitor    GERD - pt on protonix  -Will monitor    B12 Deficiency - low on 8/02/24 at 195pg/mL  -Pt on B12 injection monthly  -Will monitor    Route Chart for Scheduling    Med Onc Chart Routing      Follow up with physician Other. Pt needs blood work today with invitae, TEMPUS blood test, Pharamcogenomic.  PT needs appt with surgeon fopr PORT palcement, chemo class, chemo care companion set up.  Pt needs a EPT/CT with appt with me once completed.   Follow up with KB    Infusion scheduling note    Injection scheduling note    Labs    Imaging    Pharmacy appointment    Other referrals                Treatment Plan Information   OP GI FOLFIRINOX (oxaliplatin, leucovorin, irinotecan, fluorouracil) Q2W  Stephan Suero MD   Associated diagnosis: Pancreatic adenocarcinoma Stage IA cT1c, cN0, cM0 noted on 10/11/2024   Line of treatment: Neoadjuvant  Treatment Goal: Curative     Upcoming Treatment Dates - OP GI FOLFIRINOX (oxaliplatin, leucovorin, irinotecan, fluorouracil) Q2W     10/21/2024       Chemotherapy       oxaliplatin (ELOXATIN) 85 mg/m2 = 184 mg in D5W 536.8 mL chemo infusion       leucovorin calcium 400 mg/m2 = 870 mg in D5W 250 mL infusion       irinotecan (CAMPTOSAR) 180 mg/m2 = 390 mg in 0.9% NaCl 519.5 mL chemo infusion       fluorouraciL injection 870 mg       fluorouracil (Adrucil) 2,400 mg/m2 = 5,210 mg in 0.9% NaCl 240 mL chemo infusion       Antiemetics       palonosetron 0.25mg/dexAMETHasone 12mg in NS IVPB 0.25 mg 50 mL  11/4/2024       Chemotherapy       oxaliplatin (ELOXATIN) 85 mg/m2 = 184 mg in D5W 536.8 mL chemo infusion       leucovorin calcium 400 mg/m2 = 870 mg in D5W 250 mL infusion       irinotecan (CAMPTOSAR) 180 mg/m2 = 390 mg in 0.9% NaCl 519.5 mL chemo infusion        fluorouraciL injection 870 mg       fluorouracil (Adrucil) 2,400 mg/m2 = 5,210 mg in 0.9% NaCl 240 mL chemo infusion       Antiemetics       palonosetron 0.25mg/dexAMETHasone 12mg in NS IVPB 0.25 mg 50 mL  11/18/2024       Chemotherapy       oxaliplatin (ELOXATIN) 85 mg/m2 = 184 mg in D5W 536.8 mL chemo infusion       leucovorin calcium 400 mg/m2 = 870 mg in D5W 250 mL infusion       irinotecan (CAMPTOSAR) 180 mg/m2 = 390 mg in 0.9% NaCl 519.5 mL chemo infusion       fluorouraciL injection 870 mg       fluorouracil (Adrucil) 2,400 mg/m2 = 5,210 mg in 0.9% NaCl 240 mL chemo infusion       Antiemetics       palonosetron 0.25mg/dexAMETHasone 12mg in NS IVPB 0.25 mg 50 mL  12/2/2024       Chemotherapy       oxaliplatin (ELOXATIN) 85 mg/m2 = 184 mg in D5W 536.8 mL chemo infusion       leucovorin calcium 400 mg/m2 = 870 mg in D5W 250 mL infusion       irinotecan (CAMPTOSAR) 180 mg/m2 = 390 mg in 0.9% NaCl 519.5 mL chemo infusion       fluorouraciL injection 870 mg       fluorouracil (Adrucil) 2,400 mg/m2 = 5,210 mg in 0.9% NaCl 240 mL chemo infusion       Antiemetics       palonosetron 0.25mg/dexAMETHasone 12mg in NS IVPB 0.25 mg 50 mL      Stephan Suero MD  [facility]  Hematology and Oncology  [facility]   [address] [place] [address]   O: [phone]  F: [phone]

## 2024-10-11 ENCOUNTER — TELEPHONE (OUTPATIENT)
Dept: HEMATOLOGY/ONCOLOGY | Facility: CLINIC | Age: 69
End: 2024-10-11
Payer: MEDICARE

## 2024-10-11 ENCOUNTER — OFFICE VISIT (OUTPATIENT)
Dept: HEMATOLOGY/ONCOLOGY | Facility: CLINIC | Age: 69
End: 2024-10-11
Payer: MEDICARE

## 2024-10-11 ENCOUNTER — DOCUMENTATION ONLY (OUTPATIENT)
Dept: HEMATOLOGY/ONCOLOGY | Facility: CLINIC | Age: 69
End: 2024-10-11

## 2024-10-11 VITALS
DIASTOLIC BLOOD PRESSURE: 80 MMHG | BODY MASS INDEX: 29.08 KG/M2 | RESPIRATION RATE: 18 BRPM | HEIGHT: 71 IN | WEIGHT: 207.69 LBS | SYSTOLIC BLOOD PRESSURE: 98 MMHG | OXYGEN SATURATION: 95 % | HEART RATE: 76 BPM | TEMPERATURE: 97 F

## 2024-10-11 DIAGNOSIS — K21.9 GASTROESOPHAGEAL REFLUX DISEASE, UNSPECIFIED WHETHER ESOPHAGITIS PRESENT: ICD-10-CM

## 2024-10-11 DIAGNOSIS — C25.9 PANCREATIC ADENOCARCINOMA: Primary | ICD-10-CM

## 2024-10-11 DIAGNOSIS — I10 HYPERTENSION, UNSPECIFIED TYPE: ICD-10-CM

## 2024-10-11 DIAGNOSIS — I48.91 ATRIAL FIBRILLATION, UNSPECIFIED TYPE: ICD-10-CM

## 2024-10-11 DIAGNOSIS — E53.8 B12 DEFICIENCY: ICD-10-CM

## 2024-10-11 DIAGNOSIS — R91.8 PULMONARY NODULES: ICD-10-CM

## 2024-10-11 PROCEDURE — 99999 PR PBB SHADOW E&M-EST. PATIENT-LVL V: CPT | Mod: PBBFAC,,, | Performed by: INTERNAL MEDICINE

## 2024-10-11 PROCEDURE — 99215 OFFICE O/P EST HI 40 MIN: CPT | Mod: PBBFAC,PN | Performed by: INTERNAL MEDICINE

## 2024-10-11 NOTE — PLAN OF CARE
START ON PATHWAY REGIMEN - Pancreatic Adenocarcinoma    [id number]        Irinotecan (Camptosar)       Oxaliplatin       Leucovorin       Fluorouracil           Additional Orders: Regimen is based on the PRODIGE 24 trial by Jose et   al., 2018. The use of growth factor was mandated in some, but not all   mFOLFIRINOX studies; please follow institutional protocol/physician discretion   regarding use of growth factor.    **Always confirm dose/schedule in your pharmacy ordering system**    Patient Characteristics:  Preoperative, M0 (Clinical Staging), Resectable, Neoadjuvant Therapy Planned,   BRCA1/2 and PALB2 Mutation Absent/Unknown  Therapeutic Status: Preoperative, M0 (Clinical Staging)  AJCC T Category: cT1c  AJCC N Category: cN0  Resectability Status: Resectable  AJCC M Category: cM0  AJCC 8 Stage Grouping: IA  BRCA1/2 Mutation Status: Awaiting Test Results  PALB2 Mutation Status: Awaiting Test Results  Intent of Therapy:  Curative Intent, Discussed with Patient

## 2024-10-11 NOTE — TELEPHONE ENCOUNTER
Called patient to confirm that he was still coming for his NP appt with Dr. Suero. Spoke with pt's wife who confirmed that he did leave for his appt; however he does not have his phone on him. I voiced understanding.

## 2024-10-14 ENCOUNTER — TELEPHONE (OUTPATIENT)
Dept: HEMATOLOGY/ONCOLOGY | Facility: CLINIC | Age: 69
End: 2024-10-14
Payer: MEDICARE

## 2024-10-14 ENCOUNTER — PATIENT MESSAGE (OUTPATIENT)
Dept: HEMATOLOGY/ONCOLOGY | Facility: CLINIC | Age: 69
End: 2024-10-14
Payer: MEDICARE

## 2024-10-14 DIAGNOSIS — I48.91 ATRIAL FIBRILLATION, UNSPECIFIED TYPE: Primary | ICD-10-CM

## 2024-10-14 NOTE — TELEPHONE ENCOUNTER
"Pharamcist alerted "afternoon! new start folfirinox for next week. pt has sotalol on reported med list for Afib. sotalol can prolong Qtc, as we know 5FU can as well. Only EKG in the chart is from 2018 which was a little elevated. I'm sure the Afib affects the EKG read....just wanted you to be aware! " Dr Suero ordered EKG to be done, I scheduled this for after his chemo class on 10/15 and patient is aware.     "
,

## 2024-10-14 NOTE — TELEPHONE ENCOUNTER
Spoke to patient and wife and advised need of chemo class and went over what it was about. Patient agreed to come.

## 2024-10-14 NOTE — TELEPHONE ENCOUNTER
----- Message from [place] sent at 10/14/2024 10:15 AM CDT -----  Type: Needs Medical Advice  Who Called:  Patient   Symptoms (please be specific):    How long has patient had these symptoms:    Pharmacy name and phone #:    Best Call Back Number: [phone]  Additional Information: Patient is requesting a call back from the nurse regarding his appt. on 10/15 regarding chemo class. Patient stated he will not be taking this class.

## 2024-10-14 NOTE — NURSING
Spoke with patient to schedule education and treatment, pt states that his life is too busy for this class and he does not want to sit in the class. Explained the necessity of the class to the patient, who stated that his wife had chemo at [facility] and she did not need the class so he's not going to take it.  Pt stated he would call Dr. Suero's office and let them know. Will continue to follow. Messcornel Pendleton to update her on current situation.

## 2024-10-15 ENCOUNTER — DOCUMENTATION ONLY (OUTPATIENT)
Dept: INFUSION THERAPY | Facility: HOSPITAL | Age: 69
End: 2024-10-15
Payer: MEDICARE

## 2024-10-15 ENCOUNTER — HOSPITAL ENCOUNTER (OUTPATIENT)
Dept: CARDIOLOGY | Facility: HOSPITAL | Age: 69
Discharge: HOME | End: 2024-10-15
Attending: INTERNAL MEDICINE
Payer: MEDICARE

## 2024-10-15 DIAGNOSIS — I48.91 ATRIAL FIBRILLATION, UNSPECIFIED TYPE: ICD-10-CM

## 2024-10-15 LAB
OHS QRS DURATION: 86 MS
OHS QTC CALCULATION: 483 MS

## 2024-10-15 PROCEDURE — 93010 ELECTROCARDIOGRAM REPORT: CPT | Mod: ,,, | Performed by: INTERNAL MEDICINE

## 2024-10-15 PROCEDURE — 93005 ELECTROCARDIOGRAM TRACING: CPT | Mod: PO

## 2024-10-15 NOTE — PROGRESS NOTES
Oncology Nutrition   New Patient Education  Fred Benjamin   1955    Nutrition Education   This is a 69 y.o.male with a medical diagnosis of pancreatic adenocarcinoma.     Met w/ pt and his wife, Fina, to discuss current nutritional status and nutrition as it relates to cancer and cancer treatment. Pt currently mild nutrition risk. Pt states he has had decreased appetite and N/V on/off since diagnosis. Weight has been stable. RD discussed importance of weight maintenance throughout tx.     Wt Readings from Last 10 Encounters:   10/15/24 94 kg (207 lb 3.7 oz)   10/14/24 94.2 kg (207 lb 10.8 oz)   10/11/24 94.2 kg (207 lb 10.8 oz)   09/25/24 93.3 kg (205 lb 11 oz)   09/18/24 93.4 kg (206 lb)   09/13/24 93.6 kg (206 lb 5.6 oz)   08/14/23 95.3 kg (210 lb)   12/21/22 95.3 kg (210 lb)   06/01/21 91.6 kg (202 lb)   01/05/21 91.6 kg (202 lb)      [x] PMHx reviewed  [x] Labs reviewed    Educated on food safety and common nutrition impact symptoms associated with chemotherapy treatment. Reinforced the importance of good hydration. Handouts provided.    Answered all nutrition related questions.     Patient provided with dietitian contact number and advised to call with questions or make future appointment if further intervention is needed. RD to follow throughout tx PRN.    Jessica Alicia, MS, RD, LDN  10/15/2024  3:21 PM

## 2024-10-15 NOTE — PROGRESS NOTES
2:57 PM    Oncology   Chemotherapy School Education  Fred Benjamin   1955    Social Service Education   This is a 69 y.o.male with a medical diagnosis of pancreatic cancer.    Current Support System: The pt was accompanied by his wife, Fina who he said is his main support to him. The pt also reported having lots of friends, family, neighbors etc who are supportive to him.  His wife, Fina shared her finishing her cancer treatment about one year ago, so they are familiar with this process.    Educated on role of SW on care team and resources available at the cancer center.    The pt denied a tour of the infusion suite as he had another appointment he had to rush to today.    Patient provided with social contact number and advised to call with questions or make future appointment if further intervention is needed. SW to follow throughout tx.    Sharyn Azevedo LCSW  10/15/2024  2:58 PM

## 2024-10-15 NOTE — PROGRESS NOTES
PATIENT: Fred Benjamin  MRN: [id number]  DATE: 10/16/2024      Diagnosis:   1. Pancreatic adenocarcinoma    2. Atrial fibrillation, unspecified type    3. Pulmonary nodules    4. Hypertension, unspecified type    5. Gastroesophageal reflux disease, unspecified whether esophagitis present    6. B12 deficiency          Chief Complaint: Pancreatic adenocarcinoma (1 week follow up )      Oncologic History:      Oncologic History     Oncologic Treatment     Pathology           Subjective:    Interval History:  Mr. Benjamin is a 69 y.o. male with Arthritis, A-fib, GERD, HTN, BHUMIKA who presents for pancreatic cancer.  The patient denies CP, cough, SOB, abdominal pain, nausea, vomiting, constipation, diarrhea.  The patient denies fever, chills, night sweats, weight loss, new lumps or bumps, easy bruising or bleeding.    Prior History:   Pt initially underwetn US abdomen 8/02/24 for work up of abdominal pain.  US showed mild intrahepatic ductal dilatation.  PT underwent EUS on 9/13/24 showing for stones in the common bile duct; mass in the pancreatic tail which was biopsied; cystic lesion in the pancreatic body.  Biopsy returned positive for adenocarcinoma.  The patient was discussed at tumor board on 09/18/2024 with recommendation for referral to Surgical Oncology and Medical Oncology.  The patient underwent CT chest, abdomen, and pelvis on 09/1924 showing a small pleural plaque of platelike atelectasis anteriorly in the right hemithorax; intra and extrahepatic biliary duct dilatation; faint density within the common duct which could represent faintly calcified stones, sludge, or debris; large amount of pancreatic fat stranding and fluid around the tail of the pancreas.  Patient underwent ERCP on 09/20/2024 showing dilatation of the entire main bile duct with stone causing obstruction.  Patient underwent ampullary dilatation and biliary sphincterotomy with the biliary tree swept.  Patient met with surgical oncologist   Carrie on 09/25/2024 whom recommended 4 cycles chemotherapy followed by restaging and consideration for distal pancreatectomy.  The patient went to [facility] on 10/03/2024 and underwent CT chest, abdomen, and pelvis showing Variable sized pulmonary nodules predominantly within the left lung with a dominant lesion having irregular border measuring 12 mm in the next most prominent nodule measuring 7 mm; mild emphysema; bilobed fluid or 6 appearing lesion in the pancreatic tail; and chronic dilatation of the common bile duct.  The patient met with medical oncologist Dr. Olson on 10/03/2024 whom recommended 12 cycles of FOLFIRINOX and perioperative management.    Past Medical History:   Past Medical History:   Diagnosis Date    Arthritis     Atrial fibrillation     Cancer 03/2017    Bladder    GERD (gastroesophageal reflux disease)     Headache(784.0)     Hypertension     Pacemaker     Pancreatic cancer     Rash     Sleep apnea     no cpap       Past Surgical HIstory:   Past Surgical History:   Procedure Laterality Date    ABCESS DRAINAGE  06/01/2018    I&D with irrigation abcess left lower back    ABLATION      ATRIAL ABLATION SURGERY      x 2    BLADDER TUMOR EXCISION  03/14/2017     cysto/Cancer    CARDIAC PACEMAKER PLACEMENT      CARDIOVERSION      carpel tunnel Bilateral     CERVICAL FUSION      x3    CHOLECYSTECTOMY      COLONOSCOPY      ENDOSCOPIC ULTRASOUND OF UPPER GASTROINTESTINAL TRACT Left 09/13/2024    Procedure: ULTRASOUND, UPPER GI TRACT, ENDOSCOPIC;  Surgeon: Gerald Ochoa MD;  Location: [facility];  Service: Endoscopy;  Laterality: Left;    ERCP N/A 09/20/2024    Procedure: ERCP (ENDOSCOPIC RETROGRADE CHOLANGIOPANCREATOGRAPHY);  Surgeon: Mariano Ferreira III, MD;  Location: [facility] ENDO;  Service: Endoscopy;  Laterality: N/A;    ESOPHAGUS SURGERY      ting and then reversal    FOOT FRACTURE SURGERY      FRACTURE SURGERY      INCISION AND DRAINAGE OF ABSCESS Left 06/01/2018    Procedure:  INCISION AND DRAINAGE, ABSCESS - left, lower back;  Surgeon: Irving Edouard MD;  Location: [facility];  Service: Orthopedics;  Laterality: Left;    KNEE ARTHROSCOPY W/ MENISCAL REPAIR Left     ROTATOR CUFF REPAIR Left        Family History:   Family History   Problem Relation Name Age of Onset    Coronary artery disease Mother      Diabetes Mother      Cancer Father          Lymphoma       Social History:  reports that he has quit smoking. His smoking use included cigars and cigarettes. He has a 20 pack-year smoking history. He has never used smokeless tobacco. He reports that he does not drink alcohol and does not use drugs.    Allergies:  Review of patient's allergies indicates:   Allergen Reactions    Cleocin [clindamycin hcl] Shortness Of Breath     Constipation    Amiodarone     Amiodarone analogues      Affected thyroid    Dabigatran etexilate     Rythmol [propafenone]      Weight loss/ nausea       Medications:  Current Outpatient Medications   Medication Sig Dispense Refill    amLODIPine (NORVASC) 10 MG tablet TAKE 1 TABLET(S) EVERY DAY BY ORAL ROUTE FOR 90 DAYS.      ELIQUIS 5 mg Tab Take 5 mg by mouth 2 (two) times daily.       LIDOcaine-prilocaine (EMLA) cream Apply topically as needed (apply 30 min prior to chemotherapy). 30 g 0    [START ON 10/20/2024] loperamide (IMODIUM) 2 mg capsule Take 2 tablets (4mg) by mouth after first loose stool, 1 tablet every 2 hours until diarrhea free for 12 hours. May take 2 tablets (4mg) by mouth every 4 hours at night. May require more than the package labeling maximum dose of 16mg/day. 30 capsule 11    morphine (MS CONTIN) 30 MG 12 hr tablet Take 60 mg by mouth 2 (two) times daily.      [START ON 10/20/2024] OLANZapine (ZYPREXA) 5 MG tablet Take 1 tablet by mouth nightly on days 1-3 of each chemotherapy cycle. 6 tablet 11    oxyCODONE (ROXICODONE) 20 mg Tab immediate release tablet Take 1 tablet (20 mg total) by mouth every 6 (six) hours as needed for Pain. 28 tablet 0  "   [START ON 10/20/2024] prochlorperazine (COMPAZINE) 5 MG tablet Take 1 tablet (5 mg total) by mouth every 6 (six) hours as needed for Nausea. 20 tablet 5    sotaloL (BETAPACE) 160 MG Tab Take 160 mg by mouth once daily.       No current facility-administered medications for this visit.       Review of Systems   Constitutional:  Negative for chills, diaphoresis, fatigue, fever and unexpected weight change.   Respiratory:  Negative for cough and shortness of breath.    Cardiovascular:  Negative for chest pain and palpitations.   Gastrointestinal:  Positive for abdominal pain. Negative for constipation, diarrhea, nausea and vomiting.   Skin:  Negative for color change and rash.   Neurological:  Negative for headaches.   Hematological:  Negative for adenopathy. Does not bruise/bleed easily.       ECOG Performance Status: 1   Objective:      Vitals:   Vitals:    10/16/24 1342   BP: 100/68   BP Location: Left arm   Patient Position: Sitting   Pulse: 81   Resp: 16   Temp: 96.8 °F (36 °C)   TempSrc: Temporal   SpO2: 96%   Weight: 94.7 kg (208 lb 12.4 oz)   Height: 5' 11" (1.803 m)         Physical Exam  Constitutional:       General: He is not in acute distress.     Appearance: He is well-developed. He is not diaphoretic.   HENT:      Head: Normocephalic and atraumatic.   Cardiovascular:      Rate and Rhythm: Normal rate and regular rhythm.      Heart sounds: Normal heart sounds. No murmur heard.     No friction rub. No gallop.   Pulmonary:      Effort: Pulmonary effort is normal. No respiratory distress.      Breath sounds: Normal breath sounds. No wheezing or rales.   Chest:      Chest wall: No tenderness.   Abdominal:      General: Bowel sounds are normal. There is no distension.      Palpations: Abdomen is soft. There is no mass.      Tenderness: There is no abdominal tenderness. There is no rebound.   Musculoskeletal:      Cervical back: Normal range of motion.   Lymphadenopathy:      Cervical: No cervical " adenopathy.      Upper Body:      Right upper body: No supraclavicular or axillary adenopathy.      Left upper body: No supraclavicular or axillary adenopathy.   Skin:     General: Skin is warm and dry.      Findings: No erythema or rash.   Neurological:      Mental Status: He is alert and oriented to person, place, and time.   Psychiatric:         Behavior: Behavior normal.         Laboratory Data:  Lab Visit on 10/16/2024   Component Date Value Ref Range Status    WBC 10/16/2024 8.22  3.90 - 12.70 K/uL Final    RBC 10/16/2024 4.84  4.60 - 6.20 M/uL Final    Hemoglobin 10/16/2024 13.1 (L)  14.0 - 18.0 g/dL Final    Hematocrit 10/16/2024 40.3  40.0 - 54.0 % Final    MCV 10/16/2024 83  82 - 98 fL Final    MCH 10/16/2024 27.1  27.0 - 31.0 pg Final    MCHC 10/16/2024 32.5  32.0 - 36.0 g/dL Final    RDW 10/16/2024 14.9 (H)  11.5 - 14.5 % Final    Platelets 10/16/2024 198  150 - 450 K/uL Final    MPV 10/16/2024 9.2  9.2 - 12.9 fL Final    Immature Granulocytes 10/16/2024 0.2  0.0 - 0.5 % Final    Gran # (ANC) 10/16/2024 5.2  1.8 - 7.7 K/uL Final    Immature Grans (Abs) 10/16/2024 0.02  0.00 - 0.04 K/uL Final    Comment: Mild elevation in immature granulocytes is non specific and   can be seen in a variety of conditions including stress response,   acute inflammation, trauma and pregnancy. Correlation with other   laboratory and clinical findings is essential.      Lymph # 10/16/2024 2.1  1.0 - 4.8 K/uL Final    Mono # 10/16/2024 0.6  0.3 - 1.0 K/uL Final    Eos # 10/16/2024 0.3  0.0 - 0.5 K/uL Final    Baso # 10/16/2024 0.06  0.00 - 0.20 K/uL Final    nRBC 10/16/2024 0  0 /100 WBC Final    Gran % 10/16/2024 63.2  38.0 - 73.0 % Final    Lymph % 10/16/2024 24.9  18.0 - 48.0 % Final    Mono % 10/16/2024 7.8  4.0 - 15.0 % Final    Eosinophil % 10/16/2024 3.2  0.0 - 8.0 % Final    Basophil % 10/16/2024 0.7  0.0 - 1.9 % Final    Differential Method 10/16/2024 Automated   Final    Sodium 10/16/2024 138  136 - 145 mmol/L Final     Potassium 10/16/2024 5.2 (H)  3.5 - 5.1 mmol/L Final    Chloride 10/16/2024 104  95 - 110 mmol/L Final    CO2 10/16/2024 24  23 - 29 mmol/L Final    Glucose 10/16/2024 116 (H)  70 - 110 mg/dL Final    BUN 10/16/2024 15  8 - 23 mg/dL Final    Creatinine 10/16/2024 1.2  0.5 - 1.4 mg/dL Final    Calcium 10/16/2024 8.7  8.7 - 10.5 mg/dL Final    Total Protein 10/16/2024 7.2  6.0 - 8.4 g/dL Final    Albumin 10/16/2024 3.5  3.5 - 5.2 g/dL Final    Total Bilirubin 10/16/2024 0.5  0.1 - 1.0 mg/dL Final    Comment: For infants and newborns, interpretation of results should be based  on gestational age, weight and in agreement with clinical  observations.    Premature Infant recommended reference ranges:  Up to 24 hours.............<8.0 mg/dL  Up to 48 hours............<12.0 mg/dL  3-5 days..................<15.0 mg/dL  6-29 days.................<15.0 mg/dL      Alkaline Phosphatase 10/16/2024 94  55 - 135 U/L Final    AST 10/16/2024 17  10 - 40 U/L Final    ALT 10/16/2024 12  10 - 44 U/L Final    eGFR 10/16/2024 >60.0  >60 mL/min/1.73 m^2 Final    Anion Gap 10/16/2024 10  8 - 16 mmol/L Final    CA 19-9 10/16/2024 11.4  0.0 - 40.0 U/mL Final    Comment: The testing method is a chemiluminescent microparticle immunoassay   manufactured by [employer] and performed on the [employer]   or   nexTune system. Values obtained with different assay manufacturers   for   methods may be different and cannot be used interchangeably.     Hospital Outpatient Visit on 10/15/2024   Component Date Value Ref Range Status    QRS Duration 10/15/2024 86  ms Final    [facility] QTC Calculation 10/15/2024 483  ms Final         Imaging:   CT C/A/P 9/19/24  Chest; the visualized structures at the base of the neck are unremarkable appearance. There is anterior chest wall cardiac pacemaker with pacing leads. There are vascular calcifications including coronary artery calcifications. There is no significant hilar or mediastinal adenopathy. There is  no pleural or pericardial effusion. There are emphysematous changes in the lungs. Mild peribronchial thickening. Mild dependent hypoventilatory change. Small pleural plaque or platelike atelectasis anteriorly in the right hemithorax for example axial series 102, image 88. Degenerative changes in the osseous structures without obvious aggressive appearing osseous lesion.     Abdomen and pelvis; prior cholecystectomy. There is intra and extrahepatic biliary duct dilatation. Common duct measures 1.6 cm. There is some faint density within the common duct which could represent faintly calcified stones, sludge, debris or less likely mass. For example coronal series 604, images 64-66, axial series 3 images 136 through 139. Common duct appears dilated down to the ampulla with density appearing within the distal most common duct as well     Pancreas; much of the pancreas appears fatty replaced. There is large amount of peripancreatic fat stranding and fluid around the tail the pancreas. With history of recent biopsy this could represent post biopsy inflammatory change with differential including pancreatitis in neoplastic spread around the tail the pancreas. The very tip of the tail the pancreas is more hyperdense for example axial series 2, image 64 measuring approximately 1.7 by 0.7 cm.     There is no fat stranding or encasement around the celiac axis or superior mesenteric artery. There is no significant adenopathy.     Renal enhancement is symmetrical. Mild renal scarring and cortical thinning. No hydronephrosis. Tiny subcentimeter hypodensity right kidney suggesting cyst.     Of the abdominal aorta no aneurysm     Adrenal glands unremarkable appearance     Spleen not enlarged. Tiny hypodensity suggesting cyst     There is diverticulosis without CT findings of acute diverticulitis. Appendix not seen but no abnormal appendix is seen or inflammatory changes appendiceal region appearance suggest prior appendectomy. Urinary  bladder mildly distended at time of the exam and as visualized unremarkable appearance. Prostate gland does not appear enlarged. Small fat containing right inguinal hernia. Osseous structures show degenerative changes without obvious aggressive appearing osseous lesion    CT C/A/P 10/03/24  CHEST FINDINGS: The current study is the patient's baseline examination for the chest.   Lines, Tubes, Devices:  Dual-lead pacemaker with tips in right atrium and right ventricle.     Lungs and Pleura:   *  Relatively mild emphysematous changes are identified within the upper lung fields.   *  There are variably sized pulmonary nodules identified predominantly within the left lung. These are most prominently seen in the left lower lobe with the dominant lesion having an irregular border and measuring 12 mm image 65 series 11. The next most prominent is 7 mm also irregular image 80 series 11. Others are sub-5 mm in size. Differential, given the relative predominance in left lower lobe, would be for an infectious or inflammatory process. Aspiration pneumonia cannot be excluded. Clinical correlation is needed. Another possibility would be that one or more of these could be metastatic disease to lungs.   *  Other examples of smaller nodules seen on images 49, 55, 96 of series 11.   No pleural effusion.     Cardiomediastinum:     Heart: Atherosclerotic coronary artery calcifications.       Thyroid:   Unusual appearance of right thyroid image 12 series 10 may be indicative of an exophytic nodule.       Lymph Nodes: No adenopathy.     ABDOMEN AND PELVIS FINDINGS:     Hepatobiliary:   *  Sub-5 mm subcapsular segment 5-6 probably enhancing focus image 45 series 4.   *  No definite evidence of liver metastases   *  There is mild/moderate biliary dilatation and the common bile duct can be followed to the level the pancreatic head where it terminates just superior to the major papilla that empties into the transverse duodenum. Please see  image 55 of series 601 that suggest possibly cut off. The patient is also status post cholecystectomy. The findings appear to be relatively stable from 06/26/2023 there may be mildly prominent enhancement in this region on the study of 10/03/2022 before. Correlation would be advised with findings on outside endoscopic ultrasound exam.     Spleen: No splenomegaly. Nonspecific sub-5 mm hypodensity image 131/4.     Pancreas:   -----Solid lesion(s):   *  As noted above, there appears to be relatively abrupt termination of the common bile duct in the pancreatic head and relatively prominent enhancement at that site. This is a stable finding from the prior examination which would argue against a true lesion and could represent an abnormality of the ampulla. Advise correlation with the findings on the outside endoscopic ultrasound. Please note this is not the site of biopsy-proven cancer.   *  Please see below what appears to be a bilobed cystic type lesion that appears to be exophytic to the pancreatic tail when compared to the anatomy of the tail on the 06/26/2023 study.   *  The pancreatic tail solid lesion therefore cannot be definitely identified but the findings together appear to suggest interim inflammatory process that may be currently masking the underlying tumor. Therefore advise obtaining and evaluating the outside 09/19/2024 CT that preceded the endoscopic ultrasound study and associated biopsy   -----Main pancreatic duct: Normal   -----Vessel involvement : No definite involvement of vessels   -----Cystic lesions:   *  In the pancreatic tail is a peripherally enhancing bilobed fluid density lesion image 38 series 4 measuring 25 mm that extends exophytically from the tail. Based on comparison with the outside CT of 06/26/2023, this appears to be extrapancreatic and could be the sequela of an inflammatory episode in this patient who had prior to the study and outside endoscopic ultrasound examination and biopsy.  This therefore could represent some form of sequela of inflammation including evolving pseudocyst rather than a true cystic lesion of pancreatic origin. Please also note that the outside endoscopic ultrasound reportedly showed a mass in the tail measuring smaller in size.   *  There is prominent enhancement seen medial to this finding in the pancreatic tail image 36 of series 4. Of note this is seen on the prior 2023 study and rather than an unusual manifestation of tumor, could represent normal variant of enhancement of benign parenchyma,   *  Findings compatible with likely cystic lesion at the junction of the pancreatic body and tail measuring 11 mm image 34 is difficult to identify on the prior study.   *  Outside pathology and outside history indicates endoscopic ultrasound identified a 14 mm pancreatic tail mass. Correlation would be advised with the outside CT of 09/19/2024, obtained prior to biopsy. No definite abnormality is seen in the pancreatic tail on the 06/26/2023 study     ______________________________         Adrenal Glands:  No masses.     Kidneys, Ureters, Bladder: No hydronephrosis.  No suspicious renal lesion. No bladder mass.     Gastrointestinal Tract: No obstruction. Colonic diverticuli     Pelvic Organs: No pelvic mass.     Peritoneum/Retroperitoneum:  No ascites. Anterior abdominal wall surgery     Lymph Nodes:  No lymphadenopathy.       Vessels: Findings of atherosclerotic disease in abdominopelvic vessels.       MUSCULOSKELETAL FINDINGS:     No suspicious skeletal lesions.       Assessment:       1. Pancreatic adenocarcinoma    2. Atrial fibrillation, unspecified type    3. Pulmonary nodules    4. Hypertension, unspecified type    5. Gastroesophageal reflux disease, unspecified whether esophagitis present    6. B12 deficiency             Plan:     Adenocarcinoma of the Pancreas - patient with adenocarcinoma in the tail of the pancreas status post biopsy 09/13/2024  -CT chest, abdomen,  and pelvis on 10/03/2024 showed a lesion in the tail of the pancreas as well as multiple pulmonary nodules with 1 nodule measuring at least 12 mm  -Will obtain PET-CT  -Pt seen by Dr Butler in surgical oncology at [facility] 09/25/2024 whom recommended 4 cycles of chemo followed by restaging  -Patient seen by  at [facility] on 10/03/2024 recommending perioperative FOLFIRINOX with elimination of bolus fluorouracil and leucovorin  -Pharmacogenomic testing pending  -Tempus testing and germline mutation with [employer] pending  -Will proceed with FOLFIRINOX  Visit today included increased complexity associated with the care of the episodic problem (chemotherapy) addressed and managing the longitudinal care of the patient due to the serious and/or complex managed problem(s) pancreatic cancer.    Pulmonary Nodules - Seen on Ct chest 10/03/24 with largest noduel in left lung  -will assess on PET/CT 10/17/24    A-fib - pt on eliquis and diltiazem  -Cardiology managing  -Will monitor    HTN - pt on amlodipine, sotalol, diltiazem  -BP on lower side  -Will monitor    GERD - pt on protonix  -Will monitor    B12 Deficiency - low on 8/02/24 at 195pg/mL  -Pt on B12 injection monthly  -Will monitor    Route Chart for Scheduling    Med Onc Chart Routing      Follow up with physician 3 weeks. Pt can proceed with treatment on 10/21/24.  Pt needs a CBC, CMP,  on 11/01/24 with an appt with me and treatment on 11/04/24.   Follow up with KB    Infusion scheduling note    Injection scheduling note    Labs    Imaging    Pharmacy appointment    Other referrals                Treatment Plan Information   OP GI FOLFIRINOX (oxaliplatin, leucovorin, irinotecan, fluorouracil) Q2W  Stephan Suero MD   Associated diagnosis: Pancreatic adenocarcinoma Stage IA cT1c, cN0, cM0 noted on 10/11/2024   Line of treatment: Neoadjuvant  Treatment Goal: Curative     Upcoming Treatment Dates - OP GI FOLFIRINOX (oxaliplatin, leucovorin, irinotecan,  fluorouracil) Q2W     10/21/2024       Chemotherapy       oxaliplatin (ELOXATIN) 85 mg/m2 = 184 mg in D5W 536.8 mL chemo infusion       leucovorin calcium 400 mg/m2 = 870 mg in D5W 250 mL infusion       irinotecan (CAMPTOSAR) 180 mg/m2 = 390 mg in 0.9% NaCl 519.5 mL chemo infusion       fluorouraciL injection 870 mg       fluorouracil (Adrucil) 2,400 mg/m2 = 5,210 mg in 0.9% NaCl 240 mL chemo infusion       Antiemetics       palonosetron 0.25mg/dexAMETHasone 12mg in NS IVPB 0.25 mg 50 mL  11/4/2024       Chemotherapy       oxaliplatin (ELOXATIN) 85 mg/m2 = 184 mg in D5W 536.8 mL chemo infusion       leucovorin calcium 400 mg/m2 = 870 mg in D5W 250 mL infusion       irinotecan (CAMPTOSAR) 180 mg/m2 = 390 mg in 0.9% NaCl 519.5 mL chemo infusion       fluorouraciL injection 870 mg       fluorouracil (Adrucil) 2,400 mg/m2 = 5,210 mg in 0.9% NaCl 240 mL chemo infusion       Antiemetics       palonosetron 0.25mg/dexAMETHasone 12mg in NS IVPB 0.25 mg 50 mL  11/18/2024       Chemotherapy       oxaliplatin (ELOXATIN) 85 mg/m2 = 184 mg in D5W 536.8 mL chemo infusion       leucovorin calcium 400 mg/m2 = 870 mg in D5W 250 mL infusion       irinotecan (CAMPTOSAR) 180 mg/m2 = 390 mg in 0.9% NaCl 519.5 mL chemo infusion       fluorouraciL injection 870 mg       fluorouracil (Adrucil) 2,400 mg/m2 = 5,210 mg in 0.9% NaCl 240 mL chemo infusion       Antiemetics       palonosetron 0.25mg/dexAMETHasone 12mg in NS IVPB 0.25 mg 50 mL  12/2/2024       Chemotherapy       oxaliplatin (ELOXATIN) 85 mg/m2 = 184 mg in D5W 536.8 mL chemo infusion       leucovorin calcium 400 mg/m2 = 870 mg in D5W 250 mL infusion       irinotecan (CAMPTOSAR) 180 mg/m2 = 390 mg in 0.9% NaCl 519.5 mL chemo infusion       fluorouraciL injection 870 mg       fluorouracil (Adrucil) 2,400 mg/m2 = 5,210 mg in 0.9% NaCl 240 mL chemo infusion       Antiemetics       palonosetron 0.25mg/dexAMETHasone 12mg in NS IVPB 0.25 mg 50 mL      Stephan Suero MD  [facility]  [facility]   [address] [facility] [place] [address]   O: [phone]  F: [phone]

## 2024-10-16 ENCOUNTER — OFFICE VISIT (OUTPATIENT)
Dept: HEMATOLOGY/ONCOLOGY | Facility: CLINIC | Age: 69
End: 2024-10-16
Payer: MEDICARE

## 2024-10-16 ENCOUNTER — LAB VISIT (OUTPATIENT)
Dept: LAB | Facility: HOSPITAL | Age: 69
End: 2024-10-16
Attending: INTERNAL MEDICINE
Payer: MEDICARE

## 2024-10-16 VITALS
OXYGEN SATURATION: 96 % | TEMPERATURE: 97 F | HEIGHT: 71 IN | HEART RATE: 81 BPM | DIASTOLIC BLOOD PRESSURE: 68 MMHG | BODY MASS INDEX: 29.23 KG/M2 | WEIGHT: 208.75 LBS | SYSTOLIC BLOOD PRESSURE: 100 MMHG | RESPIRATION RATE: 16 BRPM

## 2024-10-16 DIAGNOSIS — I10 HYPERTENSION, UNSPECIFIED TYPE: ICD-10-CM

## 2024-10-16 DIAGNOSIS — C25.9 PANCREATIC ADENOCARCINOMA: Primary | ICD-10-CM

## 2024-10-16 DIAGNOSIS — I48.91 ATRIAL FIBRILLATION, UNSPECIFIED TYPE: ICD-10-CM

## 2024-10-16 DIAGNOSIS — R91.8 PULMONARY NODULES: ICD-10-CM

## 2024-10-16 DIAGNOSIS — C25.9 PANCREATIC ADENOCARCINOMA: ICD-10-CM

## 2024-10-16 DIAGNOSIS — K21.9 GASTROESOPHAGEAL REFLUX DISEASE, UNSPECIFIED WHETHER ESOPHAGITIS PRESENT: ICD-10-CM

## 2024-10-16 DIAGNOSIS — E53.8 B12 DEFICIENCY: ICD-10-CM

## 2024-10-16 LAB
ALBUMIN SERPL BCP-MCNC: 3.5 G/DL (ref 3.5–5.2)
ALP SERPL-CCNC: 94 U/L (ref 55–135)
ALT SERPL W/O P-5'-P-CCNC: 12 U/L (ref 10–44)
ANION GAP SERPL CALC-SCNC: 10 MMOL/L (ref 8–16)
AST SERPL-CCNC: 17 U/L (ref 10–40)
BASOPHILS # BLD AUTO: 0.06 K/UL (ref 0–0.2)
BASOPHILS NFR BLD: 0.7 % (ref 0–1.9)
BILIRUB SERPL-MCNC: 0.5 MG/DL (ref 0.1–1)
BUN SERPL-MCNC: 15 MG/DL (ref 8–23)
CALCIUM SERPL-MCNC: 8.7 MG/DL (ref 8.7–10.5)
CANCER AG19-9 SERPL-ACNC: 11.4 U/ML (ref 0–40)
CHLORIDE SERPL-SCNC: 104 MMOL/L (ref 95–110)
CO2 SERPL-SCNC: 24 MMOL/L (ref 23–29)
CREAT SERPL-MCNC: 1.2 MG/DL (ref 0.5–1.4)
DIFFERENTIAL METHOD BLD: ABNORMAL
EOSINOPHIL # BLD AUTO: 0.3 K/UL (ref 0–0.5)
EOSINOPHIL NFR BLD: 3.2 % (ref 0–8)
ERYTHROCYTE [DISTWIDTH] IN BLOOD BY AUTOMATED COUNT: 14.9 % (ref 11.5–14.5)
EST. GFR  (NO RACE VARIABLE): >60 ML/MIN/1.73 M^2
GLUCOSE SERPL-MCNC: 116 MG/DL (ref 70–110)
HCT VFR BLD AUTO: 40.3 % (ref 40–54)
HGB BLD-MCNC: 13.1 G/DL (ref 14–18)
IMM GRANULOCYTES # BLD AUTO: 0.02 K/UL (ref 0–0.04)
IMM GRANULOCYTES NFR BLD AUTO: 0.2 % (ref 0–0.5)
LYMPHOCYTES # BLD AUTO: 2.1 K/UL (ref 1–4.8)
LYMPHOCYTES NFR BLD: 24.9 % (ref 18–48)
MCH RBC QN AUTO: 27.1 PG (ref 27–31)
MCHC RBC AUTO-ENTMCNC: 32.5 G/DL (ref 32–36)
MCV RBC AUTO: 83 FL (ref 82–98)
MONOCYTES # BLD AUTO: 0.6 K/UL (ref 0.3–1)
MONOCYTES NFR BLD: 7.8 % (ref 4–15)
NEUTROPHILS # BLD AUTO: 5.2 K/UL (ref 1.8–7.7)
NEUTROPHILS NFR BLD: 63.2 % (ref 38–73)
NRBC BLD-RTO: 0 /100 WBC
PLATELET # BLD AUTO: 198 K/UL (ref 150–450)
PMV BLD AUTO: 9.2 FL (ref 9.2–12.9)
POTASSIUM SERPL-SCNC: 5.2 MMOL/L (ref 3.5–5.1)
PROT SERPL-MCNC: 7.2 G/DL (ref 6–8.4)
RBC # BLD AUTO: 4.84 M/UL (ref 4.6–6.2)
SODIUM SERPL-SCNC: 138 MMOL/L (ref 136–145)
WBC # BLD AUTO: 8.22 K/UL (ref 3.9–12.7)

## 2024-10-16 PROCEDURE — 86301 IMMUNOASSAY TUMOR CA 19-9: CPT | Performed by: INTERNAL MEDICINE

## 2024-10-16 PROCEDURE — 99999 PR PBB SHADOW E&M-EST. PATIENT-LVL III: CPT | Mod: PBBFAC,,, | Performed by: INTERNAL MEDICINE

## 2024-10-16 PROCEDURE — 80053 COMPREHEN METABOLIC PANEL: CPT | Mod: PN | Performed by: INTERNAL MEDICINE

## 2024-10-16 PROCEDURE — 85025 COMPLETE CBC W/AUTO DIFF WBC: CPT | Mod: PN | Performed by: INTERNAL MEDICINE

## 2024-10-16 PROCEDURE — G2211 COMPLEX E/M VISIT ADD ON: HCPCS | Mod: S$PBB,,, | Performed by: INTERNAL MEDICINE

## 2024-10-16 PROCEDURE — 99215 OFFICE O/P EST HI 40 MIN: CPT | Mod: S$PBB,,, | Performed by: INTERNAL MEDICINE

## 2024-10-16 PROCEDURE — 99213 OFFICE O/P EST LOW 20 MIN: CPT | Mod: PBBFAC,PN | Performed by: INTERNAL MEDICINE

## 2024-10-16 PROCEDURE — 36415 COLL VENOUS BLD VENIPUNCTURE: CPT | Mod: PN | Performed by: INTERNAL MEDICINE

## 2024-10-17 ENCOUNTER — PATIENT MESSAGE (OUTPATIENT)
Dept: HEMATOLOGY/ONCOLOGY | Facility: CLINIC | Age: 69
End: 2024-10-17
Payer: MEDICARE

## 2024-10-17 ENCOUNTER — TELEPHONE (OUTPATIENT)
Dept: INFUSION THERAPY | Facility: HOSPITAL | Age: 69
End: 2024-10-17
Payer: MEDICARE

## 2024-10-17 ENCOUNTER — HOSPITAL ENCOUNTER (OUTPATIENT)
Dept: RADIOLOGY | Facility: HOSPITAL | Age: 69
Discharge: HOME | End: 2024-10-17
Attending: INTERNAL MEDICINE
Payer: MEDICARE

## 2024-10-17 DIAGNOSIS — C25.9 PANCREATIC ADENOCARCINOMA: ICD-10-CM

## 2024-10-17 LAB — GLUCOSE SERPL-MCNC: 95 MG/DL (ref 70–110)

## 2024-10-17 PROCEDURE — A9552 F18 FDG: HCPCS | Mod: PN | Performed by: INTERNAL MEDICINE

## 2024-10-17 PROCEDURE — 78815 PET IMAGE W/CT SKULL-THIGH: CPT | Mod: 26,PI,, | Performed by: STUDENT IN AN ORGANIZED HEALTH CARE EDUCATION/TRAINING PROGRAM

## 2024-10-17 PROCEDURE — 78815 PET IMAGE W/CT SKULL-THIGH: CPT | Mod: TC,PN

## 2024-10-17 RX ORDER — FLUDEOXYGLUCOSE F18 500 MCI/ML
12 INJECTION INTRAVENOUS
Status: COMPLETED | OUTPATIENT
Start: 2024-10-17 | End: 2024-10-17

## 2024-10-17 RX ADMIN — FLUDEOXYGLUCOSE F-18 11 MILLICURIE: 500 INJECTION INTRAVENOUS at 07:10

## 2024-10-17 NOTE — TELEPHONE ENCOUNTER
----- Message from [place] sent at 10/17/2024  3:13 PM CDT -----  Type: Needs Medical Advice  Who Called:  Patient   Symptoms (please be specific):    How long has patient had these symptoms:    Pharmacy name and phone #:    Best Call Back Number: [phone]  Additional Information: Patient is requesting a call back to reschedule his appt. on 10/21 to 10/22.

## 2024-10-17 NOTE — PROGRESS NOTES
PET Imaging Questionnaire    Are you a Diabetic? Recent Blood Sugar level? No    Are you anemic? Bone Marrow Stimulation Meds? No    Have you had a CT Scan, if so when & where was your last one? Yes -     Have you had a PET Scan, if so when & where was your last one? No    Chemotherapy or currently on Chemotherapy? No    Radiation therapy? No    Surgical History:   Past Surgical History:   Procedure Laterality Date    ABCESS DRAINAGE  06/01/2018    I&D with irrigation abcess left lower back    ABLATION      ATRIAL ABLATION SURGERY      x 2    BLADDER TUMOR EXCISION  03/14/2017     cysto/Cancer    CARDIAC PACEMAKER PLACEMENT      CARDIOVERSION      carpel tunnel Bilateral     CERVICAL FUSION      x3    CHOLECYSTECTOMY      COLONOSCOPY      ENDOSCOPIC ULTRASOUND OF UPPER GASTROINTESTINAL TRACT Left 09/13/2024    Procedure: ULTRASOUND, UPPER GI TRACT, ENDOSCOPIC;  Surgeon: Gerald Ochoa MD;  [facility];  Service: Endoscopy;  Laterality: Left;    ERCP N/A 09/20/2024    Procedure: ERCP (ENDOSCOPIC RETROGRADE CHOLANGIOPANCREATOGRAPHY);  Surgeon: Mariano Ferreira III, MD;  Location: [facility];  Service: Endoscopy;  Laterality: N/A;    ESOPHAGUS SURGERY      ting and then reversal    FOOT FRACTURE SURGERY      FRACTURE SURGERY      INCISION AND DRAINAGE OF ABSCESS Left 06/01/2018    Procedure: INCISION AND DRAINAGE, ABSCESS - left, lower back;  Surgeon: Irving Edouard MD;  Location: [facility];  Service: Orthopedics;  Laterality: Left;    KNEE ARTHROSCOPY W/ MENISCAL REPAIR Left     ROTATOR CUFF REPAIR Left         Have you been fasting for at least 6 hours? Yes    Is there any chance you may be pregnant or breastfeeding? No    Assay: 11.82 MCi@:742   Injection Site:lt hand     Residual: .82 mCi@: 744   Technologist: Aleja Rouse Injected:11mCi

## 2024-10-17 NOTE — TELEPHONE ENCOUNTER
Spoke with the patient and let him know that per Dr Suero it was ok to move his appointment over a day.

## 2024-10-17 NOTE — TELEPHONE ENCOUNTER
I called the patient and instructed him that his PET-CT showed no clear evidence of metastatic disease.  The patient stated he was scheduled for battery replacement of his cardiac device on Monday and that his cardiologist wanted this were placed prior to starting chemotherapy.  I instructed the patient that we would reschedule his chemotherapy to later next week.  The patient expressed understanding.  All questions were answered to his satisfaction.    Stephan Suero MD  [facility]  Hematology and Oncology  [facility]   [address] [place] [address]   O: [phone]  F: [phone]

## 2024-10-22 ENCOUNTER — INFUSION (OUTPATIENT)
Dept: INFUSION THERAPY | Facility: HOSPITAL | Age: 69
End: 2024-10-22
Attending: INTERNAL MEDICINE
Payer: MEDICARE

## 2024-10-22 VITALS
HEART RATE: 63 BPM | SYSTOLIC BLOOD PRESSURE: 118 MMHG | RESPIRATION RATE: 16 BRPM | BODY MASS INDEX: 28.64 KG/M2 | HEIGHT: 71 IN | TEMPERATURE: 98 F | WEIGHT: 204.56 LBS | OXYGEN SATURATION: 98 % | DIASTOLIC BLOOD PRESSURE: 81 MMHG

## 2024-10-22 DIAGNOSIS — C25.9 PANCREATIC ADENOCARCINOMA: Primary | ICD-10-CM

## 2024-10-22 PROCEDURE — 36593 DECLOT VASCULAR DEVICE: CPT | Mod: PN

## 2024-10-22 PROCEDURE — 63600175 PHARM REV CODE 636 W HCPCS: Mod: JZ,JG,PN | Performed by: INTERNAL MEDICINE

## 2024-10-22 RX ORDER — FLUOROURACIL 50 MG/ML
400 INJECTION, SOLUTION INTRAVENOUS
Status: CANCELLED | OUTPATIENT
Start: 2024-10-22

## 2024-10-22 RX ORDER — PROCHLORPERAZINE EDISYLATE 5 MG/ML
5 INJECTION INTRAMUSCULAR; INTRAVENOUS ONCE AS NEEDED
Status: CANCELLED | OUTPATIENT
Start: 2024-10-25

## 2024-10-22 RX ORDER — HEPARIN 100 UNIT/ML
500 SYRINGE INTRAVENOUS
Status: CANCELLED | OUTPATIENT
Start: 2024-10-22

## 2024-10-22 RX ORDER — SODIUM CHLORIDE 0.9 % (FLUSH) 0.9 %
10 SYRINGE (ML) INJECTION
Status: CANCELLED | OUTPATIENT
Start: 2024-10-25

## 2024-10-22 RX ORDER — HEPARIN 100 UNIT/ML
500 SYRINGE INTRAVENOUS
Status: CANCELLED | OUTPATIENT
Start: 2024-10-25

## 2024-10-22 RX ORDER — SODIUM CHLORIDE 0.9 % (FLUSH) 0.9 %
10 SYRINGE (ML) INJECTION
Status: CANCELLED | OUTPATIENT
Start: 2024-10-22

## 2024-10-22 RX ORDER — EPINEPHRINE 0.3 MG/.3ML
0.3 INJECTION SUBCUTANEOUS ONCE AS NEEDED
Status: DISCONTINUED | OUTPATIENT
Start: 2024-10-22 | End: 2024-10-22 | Stop reason: HOSPADM

## 2024-10-22 RX ORDER — DIPHENHYDRAMINE HYDROCHLORIDE 50 MG/ML
50 INJECTION INTRAMUSCULAR; INTRAVENOUS ONCE AS NEEDED
Status: DISCONTINUED | OUTPATIENT
Start: 2024-10-22 | End: 2024-10-22 | Stop reason: HOSPADM

## 2024-10-22 RX ORDER — HEPARIN 100 UNIT/ML
500 SYRINGE INTRAVENOUS
Status: DISCONTINUED | OUTPATIENT
Start: 2024-10-22 | End: 2024-10-22 | Stop reason: HOSPADM

## 2024-10-22 RX ORDER — PROCHLORPERAZINE EDISYLATE 5 MG/ML
5 INJECTION INTRAMUSCULAR; INTRAVENOUS ONCE AS NEEDED
Status: DISCONTINUED | OUTPATIENT
Start: 2024-10-22 | End: 2024-10-22 | Stop reason: HOSPADM

## 2024-10-22 RX ORDER — EPINEPHRINE 0.3 MG/.3ML
0.3 INJECTION SUBCUTANEOUS ONCE AS NEEDED
Status: CANCELLED | OUTPATIENT
Start: 2024-10-22

## 2024-10-22 RX ORDER — DIPHENHYDRAMINE HYDROCHLORIDE 50 MG/ML
50 INJECTION INTRAMUSCULAR; INTRAVENOUS ONCE AS NEEDED
Status: CANCELLED | OUTPATIENT
Start: 2024-10-22

## 2024-10-22 RX ORDER — ATROPINE SULFATE 0.4 MG/ML
0.4 INJECTION, SOLUTION ENDOTRACHEAL; INTRAMEDULLARY; INTRAMUSCULAR; INTRAVENOUS; SUBCUTANEOUS ONCE AS NEEDED
Status: DISCONTINUED | OUTPATIENT
Start: 2024-10-22 | End: 2024-10-22 | Stop reason: HOSPADM

## 2024-10-22 RX ORDER — WATER FOR INJECTION,STERILE
VIAL (ML) INJECTION
Status: DISCONTINUED
Start: 2024-10-22 | End: 2024-10-22 | Stop reason: HOSPADM

## 2024-10-22 RX ORDER — FLUOROURACIL 50 MG/ML
400 INJECTION, SOLUTION INTRAVENOUS
Status: DISCONTINUED | OUTPATIENT
Start: 2024-10-22 | End: 2024-10-22 | Stop reason: HOSPADM

## 2024-10-22 RX ORDER — SODIUM CHLORIDE 0.9 % (FLUSH) 0.9 %
10 SYRINGE (ML) INJECTION
Status: DISCONTINUED | OUTPATIENT
Start: 2024-10-22 | End: 2024-10-22 | Stop reason: HOSPADM

## 2024-10-22 RX ORDER — ATROPINE SULFATE 0.4 MG/ML
0.4 INJECTION, SOLUTION ENDOTRACHEAL; INTRAMEDULLARY; INTRAMUSCULAR; INTRAVENOUS; SUBCUTANEOUS ONCE AS NEEDED
Status: CANCELLED | OUTPATIENT
Start: 2024-10-22

## 2024-10-22 RX ORDER — PROCHLORPERAZINE EDISYLATE 5 MG/ML
5 INJECTION INTRAMUSCULAR; INTRAVENOUS ONCE AS NEEDED
Status: CANCELLED | OUTPATIENT
Start: 2024-10-22

## 2024-10-22 RX ADMIN — ALTEPLASE 2 MG: 2.2 INJECTION, POWDER, LYOPHILIZED, FOR SOLUTION INTRAVENOUS at 09:10

## 2024-10-22 NOTE — PLAN OF CARE
Problem: Adult Inpatient Plan of Care  Goal: Plan of Care Review  Outcome: Progressing  Flowsheets (Taken 10/22/2024 0900)  Plan of Care Reviewed With:   patient   spouse  Goal: Patient-Specific Goal (Individualized)  Outcome: Progressing  Flowsheets (Taken 10/22/2024 0900)  Individualized Care Needs: Recliner, wife at chairside, education, conversation, snacks, juice, tv  Anxieties, Fears or Concerns: First day of treatment  Patient/Family-Specific Goals (Include Timeframe): Free of S/S of reaction with infusion.    Patient to Infusion for Folfirinox, accompanied by his wife. Treatment plan reviewed with patient. VSS.  Port placed Friday. No blood return. Port cathflo-ed without success. Dr. Suero notified. He wants to hold treatment and placed orders for a port study this afternoon before starting Folfirinox. Discussed with patient and wife. They verbalize understanding. Provided with copy of upcoming appointment schedule. Escorted to the front lobby in no distress for discharge to home.

## 2024-10-23 ENCOUNTER — INFUSION (OUTPATIENT)
Dept: INFUSION THERAPY | Facility: HOSPITAL | Age: 69
End: 2024-10-23
Attending: INTERNAL MEDICINE
Payer: MEDICARE

## 2024-10-23 VITALS
HEART RATE: 69 BPM | OXYGEN SATURATION: 97 % | RESPIRATION RATE: 16 BRPM | WEIGHT: 204.56 LBS | TEMPERATURE: 98 F | BODY MASS INDEX: 28.64 KG/M2 | SYSTOLIC BLOOD PRESSURE: 114 MMHG | DIASTOLIC BLOOD PRESSURE: 76 MMHG | HEIGHT: 71 IN

## 2024-10-23 DIAGNOSIS — C25.9 PANCREATIC ADENOCARCINOMA: Primary | ICD-10-CM

## 2024-10-23 PROCEDURE — A4216 STERILE WATER/SALINE, 10 ML: HCPCS | Mod: PN | Performed by: INTERNAL MEDICINE

## 2024-10-23 PROCEDURE — 25000003 PHARM REV CODE 250: Mod: PN | Performed by: INTERNAL MEDICINE

## 2024-10-23 PROCEDURE — 96417 CHEMO IV INFUS EACH ADDL SEQ: CPT | Mod: PN

## 2024-10-23 PROCEDURE — 63600175 PHARM REV CODE 636 W HCPCS: Mod: PN | Performed by: INTERNAL MEDICINE

## 2024-10-23 PROCEDURE — 96368 THER/DIAG CONCURRENT INF: CPT | Mod: PN

## 2024-10-23 PROCEDURE — 96413 CHEMO IV INFUSION 1 HR: CPT | Mod: PN

## 2024-10-23 PROCEDURE — 96415 CHEMO IV INFUSION ADDL HR: CPT | Mod: PN

## 2024-10-23 PROCEDURE — 96367 TX/PROPH/DG ADDL SEQ IV INF: CPT | Mod: PN

## 2024-10-23 PROCEDURE — 96375 TX/PRO/DX INJ NEW DRUG ADDON: CPT | Mod: PN

## 2024-10-23 PROCEDURE — 96416 CHEMO PROLONG INFUSE W/PUMP: CPT | Mod: PN

## 2024-10-23 PROCEDURE — 96411 CHEMO IV PUSH ADDL DRUG: CPT | Mod: PN

## 2024-10-23 RX ORDER — DIPHENHYDRAMINE HYDROCHLORIDE 50 MG/ML
50 INJECTION INTRAMUSCULAR; INTRAVENOUS ONCE AS NEEDED
Status: DISCONTINUED | OUTPATIENT
Start: 2024-10-23 | End: 2024-10-23 | Stop reason: HOSPADM

## 2024-10-23 RX ORDER — EPINEPHRINE 0.3 MG/.3ML
0.3 INJECTION SUBCUTANEOUS ONCE AS NEEDED
Status: DISCONTINUED | OUTPATIENT
Start: 2024-10-23 | End: 2024-10-23 | Stop reason: HOSPADM

## 2024-10-23 RX ORDER — SODIUM CHLORIDE 0.9 % (FLUSH) 0.9 %
10 SYRINGE (ML) INJECTION
Status: DISCONTINUED | OUTPATIENT
Start: 2024-10-23 | End: 2024-10-23 | Stop reason: HOSPADM

## 2024-10-23 RX ORDER — PROCHLORPERAZINE EDISYLATE 5 MG/ML
5 INJECTION INTRAMUSCULAR; INTRAVENOUS ONCE AS NEEDED
Status: DISCONTINUED | OUTPATIENT
Start: 2024-10-23 | End: 2024-10-23 | Stop reason: HOSPADM

## 2024-10-23 RX ORDER — ATROPINE SULFATE 0.4 MG/ML
0.4 INJECTION, SOLUTION ENDOTRACHEAL; INTRAMEDULLARY; INTRAMUSCULAR; INTRAVENOUS; SUBCUTANEOUS ONCE AS NEEDED
Status: COMPLETED | OUTPATIENT
Start: 2024-10-23 | End: 2024-10-23

## 2024-10-23 RX ORDER — FLUOROURACIL 50 MG/ML
400 INJECTION, SOLUTION INTRAVENOUS
Status: COMPLETED | OUTPATIENT
Start: 2024-10-23 | End: 2024-10-23

## 2024-10-23 RX ORDER — HEPARIN 100 UNIT/ML
500 SYRINGE INTRAVENOUS
Status: CANCELLED | OUTPATIENT
Start: 2024-10-23

## 2024-10-23 RX ADMIN — PROCHLORPERAZINE EDISYLATE 5 MG: 5 INJECTION INTRAMUSCULAR; INTRAVENOUS at 01:10

## 2024-10-23 RX ADMIN — SODIUM CHLORIDE 380 MG: 9 INJECTION, SOLUTION INTRAVENOUS at 11:10

## 2024-10-23 RX ADMIN — OXALIPLATIN 184 MG: 5 INJECTION, SOLUTION INTRAVENOUS at 09:10

## 2024-10-23 RX ADMIN — FLUOROURACIL 5000 MG: 50 INJECTION, SOLUTION INTRAVENOUS at 02:10

## 2024-10-23 RX ADMIN — DEXTROSE MONOHYDRATE: 5 INJECTION, SOLUTION INTRAVENOUS at 09:10

## 2024-10-23 RX ADMIN — ATROPINE SULFATE 0.4 MG: 0.4 INJECTION, SOLUTION INTRAVENOUS at 01:10

## 2024-10-23 RX ADMIN — LEUCOVORIN CALCIUM 865 MG: 350 INJECTION, POWDER, LYOPHILIZED, FOR SUSPENSION INTRAMUSCULAR; INTRAVENOUS at 11:10

## 2024-10-23 RX ADMIN — Medication 10 ML: at 01:10

## 2024-10-23 RX ADMIN — DEXAMETHASONE SODIUM PHOSPHATE 0.25 MG: 10 INJECTION, SOLUTION INTRAMUSCULAR; INTRAVENOUS at 09:10

## 2024-10-23 RX ADMIN — FLUOROURACIL 865 MG: 50 INJECTION, SOLUTION INTRAVENOUS at 02:10

## 2024-10-23 NOTE — NURSING
Pt witnessed coming out of the bathroom sweating and then running back to restroom. Pt followed to restroom with c/o abdominal cramping, nausea, and diarrhea. Pt proceeded with vomiting. VSS. Atropine and Compazine administered to pt and symptoms resolved within a few minutes. NS bolus given after chemo completed and chemo paused for a few minutes while pt having symptoms. Chelsey Hughes, charge RN notified. Pt aware to take imodium at home if more than 2 loose stools and notify MD if feels weak and not able to take in adequate amount of fluids by mouth.

## 2024-10-23 NOTE — PLAN OF CARE
Problem: Fatigue  Goal: Improved Activity Tolerance  Outcome: Progressing  Intervention: Promote Improved Energy  Flowsheets (Taken 10/23/2024 1410)  Fatigue Management:   paced activity encouraged   frequent rest breaks encouraged   fatigue-related activity identified   activity assistance provided  Sleep/Rest Enhancement:   relaxation techniques promoted   therapeutic touch utilized   awakenings minimized   natural light exposure provided   noise level reduced   room darkened   family presence promoted  Activity Management:   Ambulated -L4   Up in chair - L3   Ambulated to bathroom - L4  Environmental Support:   rest periods encouraged   distractions minimized   calm environment promoted   personal routine supported     Problem: Adult Inpatient Plan of Care  Goal: Plan of Care Review  Outcome: Progressing  Flowsheets (Taken 10/23/2024 1410)  Plan of Care Reviewed With: patient  Goal: Patient-Specific Goal (Individualized)  Outcome: Progressing  Flowsheets (Taken 10/23/2024 1410)  Individualized Care Needs: recliner, education, conversation, home food, tv, blanket, dim lights, OBI video watched, chemo spill kit given to pt  Anxieties, Fears or Concerns: side effects of meds  Goal: Optimal Comfort and Wellbeing  Outcome: Progressing  Intervention: Provide Person-Centered Care  Flowsheets (Taken 10/23/2024 1410)  Trust Relationship/Rapport:   care explained   questions answered   questions encouraged   choices provided   emotional support provided   reassurance provided   empathic listening provided     Problem: Diarrhea  Goal: Effective Diarrhea Management  Outcome: Progressing  Intervention: Manage Diarrhea  Flowsheets (Taken 10/23/2024 1410)  Nutrition Interventions: diet adjusted  Fluid/Electrolyte Management:   oral rehydration therapy initiated   intravenous fluid replacement initiated  Medication Review/Management:   high-risk medications identified   medications reviewed     Problem: Chemotherapy  Effects  Goal: Nausea and Vomiting Relief  Outcome: Progressing  Intervention: Prevent or Manage Nausea and Vomiting  Flowsheets (Taken 10/23/2024 1410)  Nausea/Vomiting Interventions:   cool cloth applied   slow deep breathing encouraged   stimuli minimized   nausea triggers minimized  Fluid/Electrolyte Management:   oral rehydration therapy initiated   intravenous fluid replacement initiated  Environmental Support:   rest periods encouraged   distractions minimized   calm environment promoted   personal routine supported

## 2024-10-23 NOTE — PLAN OF CARE
Pt arrived to clinic for C1D1 FOLFIRINOX treatment and completed therapy and discharged to home with 5FU pump infusing with ease. Pt educated on line care at home and aware of number to call for any pump issues. Pt given chemo spill kit, watched Neulasta OBI video for placement on Friday, and aware of how to use kit if needed. Pt educated on cold precautions and side effects of meds and aware of f/u appts and discharged to home in NAD.

## 2024-10-25 ENCOUNTER — INFUSION (OUTPATIENT)
Dept: INFUSION THERAPY | Facility: HOSPITAL | Age: 69
End: 2024-10-25
Attending: INTERNAL MEDICINE
Payer: MEDICARE

## 2024-10-25 DIAGNOSIS — C25.9 PANCREATIC ADENOCARCINOMA: Primary | ICD-10-CM

## 2024-10-25 PROCEDURE — 25000003 PHARM REV CODE 250: Mod: PN | Performed by: INTERNAL MEDICINE

## 2024-10-25 PROCEDURE — 63600175 PHARM REV CODE 636 W HCPCS: Mod: JZ,JG,PN | Performed by: INTERNAL MEDICINE

## 2024-10-25 PROCEDURE — 96377 APPLICATON ON-BODY INJECTOR: CPT | Mod: PN

## 2024-10-25 PROCEDURE — A4216 STERILE WATER/SALINE, 10 ML: HCPCS | Mod: PN | Performed by: INTERNAL MEDICINE

## 2024-10-25 RX ORDER — PROCHLORPERAZINE EDISYLATE 5 MG/ML
5 INJECTION INTRAMUSCULAR; INTRAVENOUS ONCE AS NEEDED
Status: DISCONTINUED | OUTPATIENT
Start: 2024-10-25 | End: 2024-10-25 | Stop reason: HOSPADM

## 2024-10-25 RX ORDER — SODIUM CHLORIDE 0.9 % (FLUSH) 0.9 %
10 SYRINGE (ML) INJECTION
Status: DISCONTINUED | OUTPATIENT
Start: 2024-10-25 | End: 2024-10-25 | Stop reason: HOSPADM

## 2024-10-25 RX ADMIN — SODIUM CHLORIDE, PRESERVATIVE FREE 10 ML: 5 INJECTION INTRAVENOUS at 09:10

## 2024-10-25 RX ADMIN — PEGFILGRASTIM 6 MG: KIT SUBCUTANEOUS at 09:10

## 2024-10-25 NOTE — PLAN OF CARE
"Received call from pt this am, stating his CADD pump has been malfunctioning since 5 am this morning, with the pump saying "battery depletion." He replaced the batteries twice without success and called the [employer] help line. Help line stated it was a pump malfunction and to call us. Spoke with pt at 830 and advised him to come in for further evaluation. I attempted to start pump again but it immediately went to "battery depleted" According to the pump, he had 38mL left, spoke with pharmacy and Dr. Suero, who gave the ok to run pump at higher rate to be able to complete chemo, pt declined and wished to be done for the day. Pump removed from pt and turned into pharm, port flushed with saline and de accessed. OBI placed on pt's right arm and explained when to expect to remove device.   "

## 2024-10-28 ENCOUNTER — TELEPHONE (OUTPATIENT)
Dept: HEMATOLOGY/ONCOLOGY | Facility: CLINIC | Age: 69
End: 2024-10-28
Payer: MEDICARE

## 2024-11-01 ENCOUNTER — OFFICE VISIT (OUTPATIENT)
Dept: HEMATOLOGY/ONCOLOGY | Facility: CLINIC | Age: 69
End: 2024-11-01
Payer: MEDICARE

## 2024-11-01 ENCOUNTER — INFUSION (OUTPATIENT)
Dept: INFUSION THERAPY | Facility: HOSPITAL | Age: 69
End: 2024-11-01
Attending: INTERNAL MEDICINE
Payer: MEDICARE

## 2024-11-01 ENCOUNTER — TELEPHONE (OUTPATIENT)
Dept: HEMATOLOGY/ONCOLOGY | Facility: CLINIC | Age: 69
End: 2024-11-01
Payer: MEDICARE

## 2024-11-01 ENCOUNTER — LAB VISIT (OUTPATIENT)
Dept: LAB | Facility: HOSPITAL | Age: 69
End: 2024-11-01
Attending: NURSE PRACTITIONER
Payer: MEDICARE

## 2024-11-01 VITALS
HEART RATE: 78 BPM | RESPIRATION RATE: 16 BRPM | WEIGHT: 204 LBS | TEMPERATURE: 97 F | SYSTOLIC BLOOD PRESSURE: 111 MMHG | BODY MASS INDEX: 27.63 KG/M2 | HEIGHT: 72 IN | OXYGEN SATURATION: 98 % | DIASTOLIC BLOOD PRESSURE: 76 MMHG

## 2024-11-01 VITALS
BODY MASS INDEX: 27.75 KG/M2 | OXYGEN SATURATION: 98 % | SYSTOLIC BLOOD PRESSURE: 116 MMHG | DIASTOLIC BLOOD PRESSURE: 80 MMHG | RESPIRATION RATE: 16 BRPM | HEIGHT: 72 IN | TEMPERATURE: 97 F | HEART RATE: 62 BPM

## 2024-11-01 DIAGNOSIS — R11.0 CHEMOTHERAPY-INDUCED NAUSEA: ICD-10-CM

## 2024-11-01 DIAGNOSIS — C25.9 PANCREATIC ADENOCARCINOMA: Primary | ICD-10-CM

## 2024-11-01 DIAGNOSIS — K52.1 CHEMOTHERAPY INDUCED DIARRHEA: ICD-10-CM

## 2024-11-01 DIAGNOSIS — B37.0 ORAL CANDIDIASIS: ICD-10-CM

## 2024-11-01 DIAGNOSIS — C25.9 PANCREATIC ADENOCARCINOMA: ICD-10-CM

## 2024-11-01 DIAGNOSIS — K52.1 CHEMOTHERAPY INDUCED DIARRHEA: Primary | ICD-10-CM

## 2024-11-01 DIAGNOSIS — T45.1X5A CHEMOTHERAPY INDUCED DIARRHEA: ICD-10-CM

## 2024-11-01 DIAGNOSIS — T45.1X5A CHEMOTHERAPY INDUCED DIARRHEA: Primary | ICD-10-CM

## 2024-11-01 DIAGNOSIS — T45.1X5A CHEMOTHERAPY-INDUCED NAUSEA: ICD-10-CM

## 2024-11-01 LAB
ALBUMIN SERPL BCP-MCNC: 3.5 G/DL (ref 3.5–5.2)
ALP SERPL-CCNC: 105 U/L (ref 40–150)
ALT SERPL W/O P-5'-P-CCNC: 14 U/L (ref 10–44)
ANION GAP SERPL CALC-SCNC: 16 MMOL/L (ref 8–16)
ANISOCYTOSIS BLD QL SMEAR: SLIGHT
AST SERPL-CCNC: 14 U/L (ref 10–40)
BASOPHILS # BLD AUTO: 0.09 K/UL (ref 0–0.2)
BASOPHILS NFR BLD: 1.5 % (ref 0–1.9)
BILIRUB SERPL-MCNC: 0.5 MG/DL (ref 0.1–1)
BUN SERPL-MCNC: 26 MG/DL (ref 8–23)
CALCIUM SERPL-MCNC: 8.9 MG/DL (ref 8.7–10.5)
CHLORIDE SERPL-SCNC: 100 MMOL/L (ref 95–110)
CO2 SERPL-SCNC: 19 MMOL/L (ref 23–29)
CREAT SERPL-MCNC: 1.5 MG/DL (ref 0.5–1.4)
DIFFERENTIAL METHOD BLD: ABNORMAL
EOSINOPHIL # BLD AUTO: 0.1 K/UL (ref 0–0.5)
EOSINOPHIL NFR BLD: 1.4 % (ref 0–8)
ERYTHROCYTE [DISTWIDTH] IN BLOOD BY AUTOMATED COUNT: 14.7 % (ref 11.5–14.5)
EST. GFR  (NO RACE VARIABLE): 49.5 ML/MIN/1.73 M^2
GLUCOSE SERPL-MCNC: 109 MG/DL (ref 70–110)
HCT VFR BLD AUTO: 43.5 % (ref 40–54)
HGB BLD-MCNC: 14.5 G/DL (ref 14–18)
IMM GRANULOCYTES # BLD AUTO: 0.1 K/UL (ref 0–0.04)
IMM GRANULOCYTES NFR BLD AUTO: 1.7 % (ref 0–0.5)
LYMPHOCYTES # BLD AUTO: 1.2 K/UL (ref 1–4.8)
LYMPHOCYTES NFR BLD: 21.3 % (ref 18–48)
MAGNESIUM SERPL-MCNC: 1.8 MG/DL (ref 1.6–2.6)
MCH RBC QN AUTO: 27.2 PG (ref 27–31)
MCHC RBC AUTO-ENTMCNC: 33.3 G/DL (ref 32–36)
MCV RBC AUTO: 82 FL (ref 82–98)
MONOCYTES # BLD AUTO: 1 K/UL (ref 0.3–1)
MONOCYTES NFR BLD: 16.5 % (ref 4–15)
NEUTROPHILS # BLD AUTO: 3.4 K/UL (ref 1.8–7.7)
NEUTROPHILS NFR BLD: 57.6 % (ref 38–73)
NRBC BLD-RTO: 0 /100 WBC
PLATELET # BLD AUTO: 175 K/UL (ref 150–450)
PLATELET BLD QL SMEAR: ABNORMAL
PMV BLD AUTO: 10.9 FL (ref 9.2–12.9)
POTASSIUM SERPL-SCNC: 4.1 MMOL/L (ref 3.5–5.1)
PROT SERPL-MCNC: 7.5 G/DL (ref 6–8.4)
RBC # BLD AUTO: 5.33 M/UL (ref 4.6–6.2)
SODIUM SERPL-SCNC: 135 MMOL/L (ref 136–145)
TOXIC GRANULES BLD QL SMEAR: PRESENT
WBC # BLD AUTO: 5.82 K/UL (ref 3.9–12.7)

## 2024-11-01 PROCEDURE — 96375 TX/PRO/DX INJ NEW DRUG ADDON: CPT | Mod: PN

## 2024-11-01 PROCEDURE — 63600175 PHARM REV CODE 636 W HCPCS: Mod: PN | Performed by: NURSE PRACTITIONER

## 2024-11-01 PROCEDURE — 80053 COMPREHEN METABOLIC PANEL: CPT | Mod: PN | Performed by: INTERNAL MEDICINE

## 2024-11-01 PROCEDURE — 96374 THER/PROPH/DIAG INJ IV PUSH: CPT | Mod: PN

## 2024-11-01 PROCEDURE — 25000003 PHARM REV CODE 250: Mod: PN | Performed by: NURSE PRACTITIONER

## 2024-11-01 PROCEDURE — 99999 PR PBB SHADOW E&M-EST. PATIENT-LVL III: CPT | Mod: PBBFAC,,, | Performed by: NURSE PRACTITIONER

## 2024-11-01 PROCEDURE — 63600175 PHARM REV CODE 636 W HCPCS: Mod: PN | Performed by: INTERNAL MEDICINE

## 2024-11-01 PROCEDURE — 99213 OFFICE O/P EST LOW 20 MIN: CPT | Mod: PBBFAC,PN | Performed by: NURSE PRACTITIONER

## 2024-11-01 PROCEDURE — 36415 COLL VENOUS BLD VENIPUNCTURE: CPT | Mod: PN | Performed by: INTERNAL MEDICINE

## 2024-11-01 PROCEDURE — 83735 ASSAY OF MAGNESIUM: CPT | Mod: PN | Performed by: INTERNAL MEDICINE

## 2024-11-01 PROCEDURE — 85025 COMPLETE CBC W/AUTO DIFF WBC: CPT | Mod: PN | Performed by: INTERNAL MEDICINE

## 2024-11-01 RX ORDER — SODIUM CHLORIDE 0.9 % (FLUSH) 0.9 %
10 SYRINGE (ML) INJECTION
Status: DISCONTINUED | OUTPATIENT
Start: 2024-11-01 | End: 2024-11-01 | Stop reason: HOSPADM

## 2024-11-01 RX ORDER — SODIUM CHLORIDE 0.9 % (FLUSH) 0.9 %
10 SYRINGE (ML) INJECTION
Status: CANCELLED | OUTPATIENT
Start: 2024-11-01

## 2024-11-01 RX ORDER — ONDANSETRON HYDROCHLORIDE 2 MG/ML
8 INJECTION, SOLUTION INTRAVENOUS ONCE AS NEEDED
Status: COMPLETED | OUTPATIENT
Start: 2024-11-01 | End: 2024-11-01

## 2024-11-01 RX ORDER — ONDANSETRON 8 MG/1
8 TABLET, ORALLY DISINTEGRATING ORAL EVERY 8 HOURS PRN
Qty: 40 TABLET | Refills: 3 | Status: SHIPPED | OUTPATIENT
Start: 2024-11-01 | End: 2025-11-01

## 2024-11-01 RX ORDER — METOCLOPRAMIDE HYDROCHLORIDE 5 MG/ML
10 INJECTION INTRAMUSCULAR; INTRAVENOUS ONCE
Status: COMPLETED | OUTPATIENT
Start: 2024-11-01 | End: 2024-11-01

## 2024-11-01 RX ORDER — METOCLOPRAMIDE HYDROCHLORIDE 5 MG/ML
10 INJECTION INTRAMUSCULAR; INTRAVENOUS ONCE
Status: CANCELLED
Start: 2024-11-01 | End: 2024-11-01

## 2024-11-01 RX ORDER — ATROPINE SULFATE 0.4 MG/ML
0.4 INJECTION, SOLUTION ENDOTRACHEAL; INTRAMEDULLARY; INTRAMUSCULAR; INTRAVENOUS; SUBCUTANEOUS
Status: COMPLETED | OUTPATIENT
Start: 2024-11-01 | End: 2024-11-01

## 2024-11-01 RX ORDER — ATROPINE SULFATE 0.4 MG/ML
0.4 INJECTION, SOLUTION ENDOTRACHEAL; INTRAMEDULLARY; INTRAMUSCULAR; INTRAVENOUS; SUBCUTANEOUS
Status: CANCELLED
Start: 2024-11-01

## 2024-11-01 RX ORDER — ONDANSETRON 2 MG/ML
8 INJECTION INTRAMUSCULAR; INTRAVENOUS
Status: DISCONTINUED | OUTPATIENT
Start: 2024-11-01 | End: 2024-11-01

## 2024-11-01 RX ORDER — HEPARIN 100 UNIT/ML
500 SYRINGE INTRAVENOUS
Status: CANCELLED | OUTPATIENT
Start: 2024-11-01

## 2024-11-01 RX ORDER — ONDANSETRON 2 MG/ML
8 INJECTION INTRAMUSCULAR; INTRAVENOUS
Status: CANCELLED
Start: 2024-11-01

## 2024-11-01 RX ADMIN — METOCLOPRAMIDE HYDROCHLORIDE 10 MG: 5 INJECTION INTRAMUSCULAR; INTRAVENOUS at 02:11

## 2024-11-01 RX ADMIN — SODIUM CHLORIDE 1000 ML: 9 INJECTION, SOLUTION INTRAVENOUS at 02:11

## 2024-11-01 RX ADMIN — ATROPINE SULFATE 0.4 MG: 0.4 INJECTION, SOLUTION INTRAVENOUS at 02:11

## 2024-11-01 RX ADMIN — ONDANSETRON 8 MG: 2 INJECTION INTRAMUSCULAR; INTRAVENOUS at 02:11

## 2024-11-04 NOTE — PROGRESS NOTES
PATIENT: Fred Benjamin  MRN: [id number]  DATE: 11/5/2024      Diagnosis:   1. Hypotension, unspecified hypotension type    2. [place] (acute kidney injury)    3. Hypokalemia    4. Dehydration    5. Pancreatic adenocarcinoma    6. 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 Atrial fibrillation, unspecified type    9. Pulmonary nodules    10. Hypertension, unspecified type    11. Gastroesophageal reflux disease, unspecified whether esophagitis present    12. B12 deficiency            Chief Complaint: No chief complaint on file.      Oncologic History:      Oncologic History     Oncologic Treatment     Pathology           Subjective:    Interval History:  Mr. Benjamin is a 69 y.o. male with Arthritis, A-fib, GERD, HTN, BHUMIKA who presents for pancreatic cancer.  Since the last clinic visit the patient endorses continued N/V and diarrhea.  He states his diarrhea improved today.  Pt with weight loss.  The patient denies CP, cough, SOB, abdominal pain, constipation.  The patient denies fever, chills, night sweats, new lumps or bumps, easy bruising or bleeding.  Pt endorses left hip pain.    Prior History:   Pt initially underwetn US abdomen 8/02/24 for work up of abdominal pain.  US showed mild intrahepatic ductal dilatation.  PT underwent EUS on 9/13/24 showing for stones in the common bile duct; mass in the pancreatic tail which was biopsied; cystic lesion in the pancreatic body.  Biopsy returned positive for adenocarcinoma.  The patient was discussed at tumor board on 09/18/2024 with recommendation for referral to Surgical Oncology and Medical Oncology.  The patient underwent CT chest, abdomen, and pelvis on 09/1924 showing a small pleural plaque of platelike atelectasis anteriorly in the right hemithorax; intra and extrahepatic biliary duct dilatation; faint density within the common duct which could represent faintly calcified stones, sludge, or debris; large amount of pancreatic fat  stranding and fluid around the tail of the pancreas.  Patient underwent ERCP on 09/20/2024 showing dilatation of the entire main bile duct with stone causing obstruction.  Patient underwent ampullary dilatation and biliary sphincterotomy with the biliary tree swept.  Patient met with surgical oncologist Dr. Butler on 09/25/2024 whom recommended 4 cycles chemotherapy followed by restaging and consideration for distal pancreatectomy.  The patient went to [facility] on 10/03/2024 and underwent CT chest, abdomen, and pelvis showing Variable sized pulmonary nodules predominantly within the left lung with a dominant lesion having irregular border measuring 12 mm in the next most prominent nodule measuring 7 mm; mild emphysema; bilobed fluid or 6 appearing lesion in the pancreatic tail; and chronic dilatation of the common bile duct.  The patient met with medical oncologist Dr. Olson on 10/03/2024 whom recommended 12 cycles of FOLFIRINOX and perioperative management.    Past Medical History:   Past Medical History:   Diagnosis Date    Arthritis     Atrial fibrillation     Cancer 03/2017    Bladder    GERD (gastroesophageal reflux disease)     Headache(784.0)     Hypertension     Pacemaker     Pancreatic cancer     Rash     Sleep apnea     no cpap       Past Surgical HIstory:   Past Surgical History:   Procedure Laterality Date    ABCESS DRAINAGE  06/01/2018    I&D with irrigation abcess left lower back    ABLATION      ATRIAL ABLATION SURGERY      x 2    BLADDER TUMOR EXCISION  03/14/2017     cysto/Cancer    CARDIAC PACEMAKER PLACEMENT      CARDIOVERSION      carpel tunnel Bilateral     CERVICAL FUSION      x3    CHOLECYSTECTOMY      COLONOSCOPY      ENDOSCOPIC ULTRASOUND OF UPPER GASTROINTESTINAL TRACT Left 09/13/2024    Procedure: ULTRASOUND, UPPER GI TRACT, ENDOSCOPIC;  Surgeon: Gerald Ochoa MD;  Location: [facility];  Service: Endoscopy;  Laterality: Left;    ERCP N/A 09/20/2024    Procedure: ERCP (ENDOSCOPIC  RETROGRADE CHOLANGIOPANCREATOGRAPHY);  Surgeon: Mariano Ferreira III, MD;  Location: [facility] ENDO;  Service: Endoscopy;  Laterality: N/A;    ESOPHAGUS SURGERY      ting and then reversal    FOOT FRACTURE SURGERY      FRACTURE SURGERY      INCISION AND DRAINAGE OF ABSCESS Left 06/01/2018    Procedure: INCISION AND DRAINAGE, ABSCESS - left, lower back;  Surgeon: Irving Edouard MD;  Location: [facility] OR;  Service: Orthopedics;  Laterality: Left;    INSERTION OF TUNNELED CENTRAL VENOUS CATHETER (CVC) WITH SUBCUTANEOUS PORT N/A 10/18/2024    Procedure: [id number];  Surgeon: Irving Alicia MD;  Location: [facility] OR;  Service: General;  Laterality: N/A;    KNEE ARTHROSCOPY W/ MENISCAL REPAIR Left     ROTATOR CUFF REPAIR Left        Family History:   Family History   Problem Relation Name Age of Onset    Coronary artery disease Mother      Diabetes Mother      Cancer Father          Lymphoma       Social History:  reports that he has quit smoking. His smoking use included cigars and cigarettes. He has a 20 pack-year smoking history. He has never used smokeless tobacco. He reports that he does not drink alcohol and does not use drugs.    Allergies:  Review of patient's allergies indicates:   Allergen Reactions    Cleocin [clindamycin hcl] Shortness Of Breath     Constipation    Amiodarone     Amiodarone analogues      Affected thyroid    Dabigatran etexilate     Rythmol [propafenone]      Weight loss/ nausea       Medications:  Current Outpatient Medications   Medication Sig Dispense Refill    amLODIPine (NORVASC) 10 MG tablet TAKE 1 TABLET(S) EVERY DAY BY ORAL ROUTE FOR 90 DAYS.      ELIQUIS 5 mg Tab Take 5 mg by mouth 2 (two) times daily.       LIDOcaine-prilocaine (EMLA) cream Apply topically as needed (apply 30 min prior to chemotherapy). 30 g 0    loperamide (IMODIUM) 2 mg capsule Take 2 tablets (4mg) by mouth after first loose stool, 1 tablet every 2 hours until diarrhea free for 12 hours. May take 2 tablets  (4mg) by mouth every 4 hours at night. May require more than the package labeling maximum dose of 16mg/day. 30 capsule 11    magic mouthwash diphen/antac/lidoc/nysta Swish 10 mLs by mouth, then spit or swallow, every 4 to 6 hours as needed for mouth pain/ulcers/yeast 240 mL 1    morphine (MS CONTIN) 30 MG 12 hr tablet Take 60 mg by mouth 2 (two) times daily.      OLANZapine (ZYPREXA) 5 MG tablet Take 1 tablet by mouth nightly on days 1-3 of each chemotherapy cycle. 6 tablet 11    ondansetron (ZOFRAN-ODT) 8 MG TbDL Take 1 tablet (8 mg total) by mouth every 8 (eight) hours as needed (nausea). 40 tablet 3    oxyCODONE (ROXICODONE) 20 mg Tab immediate release tablet Take 1 tablet (20 mg total) by mouth every 6 (six) hours as needed for Pain. 28 tablet 0    prochlorperazine (COMPAZINE) 5 MG tablet Take 1 tablet (5 mg total) by mouth every 6 (six) hours as needed for Nausea. 20 tablet 5    sotaloL (BETAPACE) 160 MG Tab Take 160 mg by mouth once daily.      potassium chloride SA (K-DUR,KLOR-CON) 20 MEQ tablet Take 1 tablet (20 mEq total) by mouth 2 (two) times daily. 30 tablet 11     No current facility-administered medications for this visit.       Review of Systems   Constitutional:  Negative for chills, diaphoresis, fatigue, fever and unexpected weight change.   Respiratory:  Negative for cough and shortness of breath.    Cardiovascular:  Negative for chest pain and palpitations.   Gastrointestinal:  Positive for diarrhea, nausea and vomiting. Negative for abdominal pain and constipation.   Musculoskeletal:         Left hip pain   Skin:  Negative for color change and rash.   Neurological:  Positive for weakness. Negative for headaches.   Hematological:  Negative for adenopathy. Does not bruise/bleed easily.       ECOG Performance Status: 1   Objective:      Vitals:   Vitals:    11/05/24 0841   Pulse: 87   Resp: 16   Temp: 97.2 °F (36.2 °C)   TempSrc: Temporal   SpO2: 96%   Weight: 84.5 kg (186 lb 4.6 oz)   Height: 6'  (1.829 m)           Physical Exam  Constitutional:       General: He is not in acute distress.     Appearance: He is well-developed. He is not diaphoretic.   HENT:      Head: Normocephalic and atraumatic.   Cardiovascular:      Rate and Rhythm: Normal rate and regular rhythm.      Heart sounds: Normal heart sounds. No murmur heard.     No friction rub. No gallop.   Pulmonary:      Effort: Pulmonary effort is normal. No respiratory distress.      Breath sounds: Normal breath sounds. No wheezing or rales.   Chest:      Chest wall: No tenderness.   Abdominal:      General: Bowel sounds are normal. There is no distension.      Palpations: Abdomen is soft. There is no mass.      Tenderness: There is no abdominal tenderness. There is no rebound.   Musculoskeletal:      Cervical back: Normal range of motion.      Comments: Pain on palpation of left greater trochanter    Lymphadenopathy:      Cervical: No cervical adenopathy.      Upper Body:      Right upper body: No supraclavicular or axillary adenopathy.      Left upper body: No supraclavicular or axillary adenopathy.   Skin:     General: Skin is warm and dry.      Findings: No erythema or rash.   Neurological:      Mental Status: He is alert and oriented to person, place, and time.   Psychiatric:         Behavior: Behavior normal.         Laboratory Data:  Lab Visit on 11/05/2024   Component Date Value Ref Range Status    WBC 11/05/2024 13.11 (H)  3.90 - 12.70 K/uL Final    RBC 11/05/2024 5.74  4.60 - 6.20 M/uL Final    Hemoglobin 11/05/2024 15.2  14.0 - 18.0 g/dL Final    Hematocrit 11/05/2024 45.4  40.0 - 54.0 % Final    MCV 11/05/2024 79 (L)  82 - 98 fL Final    MCH 11/05/2024 26.5 (L)  27.0 - 31.0 pg Final    MCHC 11/05/2024 33.5  32.0 - 36.0 g/dL Final    RDW 11/05/2024 14.8 (H)  11.5 - 14.5 % Final    Platelets 11/05/2024 243  150 - 450 K/uL Final    MPV 11/05/2024 9.3  9.2 - 12.9 fL Final    Immature Granulocytes 11/05/2024 4.6 (H)  0.0 - 0.5 % Final    Gran #  (ANC) 11/05/2024 9.0 (H)  1.8 - 7.7 K/uL Final    Immature Grans (Abs) 11/05/2024 0.60 (H)  0.00 - 0.04 K/uL Final    Comment: Mild elevation in immature granulocytes is non specific and   can be seen in a variety of conditions including stress response,   acute inflammation, trauma and pregnancy. Correlation with other   laboratory and clinical findings is essential.      Lymph # 11/05/2024 2.1  1.0 - 4.8 K/uL Final    Mono # 11/05/2024 1.3 (H)  0.3 - 1.0 K/uL Final    Eos # 11/05/2024 0.0  0.0 - 0.5 K/uL Final    Baso # 11/05/2024 0.03  0.00 - 0.20 K/uL Final    nRBC 11/05/2024 0  0 /100 WBC Final    Gran % 11/05/2024 69.0  38.0 - 73.0 % Final    Lymph % 11/05/2024 16.1 (L)  18.0 - 48.0 % Final    Mono % 11/05/2024 9.8  4.0 - 15.0 % Final    Eosinophil % 11/05/2024 0.3  0.0 - 8.0 % Final    Basophil % 11/05/2024 0.2  0.0 - 1.9 % Final    Platelet Estimate 11/05/2024 Appears normal   Final    Differential Method 11/05/2024 Automated   Final    Sodium 11/05/2024 131 (L)  136 - 145 mmol/L Final    Potassium 11/05/2024 3.2 (L)  3.5 - 5.1 mmol/L Final    Chloride 11/05/2024 98  95 - 110 mmol/L Final    CO2 11/05/2024 15 (L)  23 - 29 mmol/L Final    Glucose 11/05/2024 127 (H)  70 - 110 mg/dL Final    BUN 11/05/2024 40 (H)  8 - 23 mg/dL Final    Creatinine 11/05/2024 2.3 (H)  0.5 - 1.4 mg/dL Final    Calcium 11/05/2024 8.5 (L)  8.7 - 10.5 mg/dL Final    Total Protein 11/05/2024 7.1  6.0 - 8.4 g/dL Final    Albumin 11/05/2024 3.2 (L)  3.5 - 5.2 g/dL Final    Total Bilirubin 11/05/2024 0.4  0.1 - 1.0 mg/dL Final    Comment: For infants and newborns, interpretation of results should be based  on gestational age, weight and in agreement with clinical  observations.    Premature Infant recommended reference ranges:  Up to 24 hours.............<8.0 mg/dL  Up to 48 hours............<12.0 mg/dL  3-5 days..................<15.0 mg/dL  6-29 days.................<15.0 mg/dL      Alkaline Phosphatase 11/05/2024 96  40 - 150 U/L  Final    AST 11/05/2024 19  10 - 40 U/L Final    ALT 11/05/2024 17  10 - 44 U/L Final    eGFR 11/05/2024 29.6 (A)  >60 mL/min/1.73 m^2 Final    Anion Gap 11/05/2024 18 (H)  8 - 16 mmol/L Final   Lab Visit on 11/01/2024   Component Date Value Ref Range Status    WBC 11/01/2024 5.82  3.90 - 12.70 K/uL Final    RBC 11/01/2024 5.33  4.60 - 6.20 M/uL Final    Hemoglobin 11/01/2024 14.5  14.0 - 18.0 g/dL Final    Hematocrit 11/01/2024 43.5  40.0 - 54.0 % Final    MCV 11/01/2024 82  82 - 98 fL Final    MCH 11/01/2024 27.2  27.0 - 31.0 pg Final    MCHC 11/01/2024 33.3  32.0 - 36.0 g/dL Final    RDW 11/01/2024 14.7 (H)  11.5 - 14.5 % Final    Platelets 11/01/2024 175  150 - 450 K/uL Final    MPV 11/01/2024 10.9  9.2 - 12.9 fL Final    Immature Granulocytes 11/01/2024 1.7 (H)  0.0 - 0.5 % Final    Gran # (ANC) 11/01/2024 3.4  1.8 - 7.7 K/uL Final    Immature Grans (Abs) 11/01/2024 0.10 (H)  0.00 - 0.04 K/uL Final    Comment: Mild elevation in immature granulocytes is non specific and   can be seen in a variety of conditions including stress response,   acute inflammation, trauma and pregnancy. Correlation with other   laboratory and clinical findings is essential.      Lymph # 11/01/2024 1.2  1.0 - 4.8 K/uL Final    Mono # 11/01/2024 1.0  0.3 - 1.0 K/uL Final    Eos # 11/01/2024 0.1  0.0 - 0.5 K/uL Final    Baso # 11/01/2024 0.09  0.00 - 0.20 K/uL Final    nRBC 11/01/2024 0  0 /100 WBC Final    Gran % 11/01/2024 57.6  38.0 - 73.0 % Final    Lymph % 11/01/2024 21.3  18.0 - 48.0 % Final    Mono % 11/01/2024 16.5 (H)  4.0 - 15.0 % Final    Eosinophil % 11/01/2024 1.4  0.0 - 8.0 % Final    Basophil % 11/01/2024 1.5  0.0 - 1.9 % Final    Platelet Estimate 11/01/2024 Appears normal   Final    Aniso 11/01/2024 Slight   Final    Toxic Granulation 11/01/2024 Present   Final    Differential Method 11/01/2024 Automated   Final    Sodium 11/01/2024 135 (L)  136 - 145 mmol/L Final    Potassium 11/01/2024 4.1  3.5 - 5.1 mmol/L Final     Chloride 11/01/2024 100  95 - 110 mmol/L Final    CO2 11/01/2024 19 (L)  23 - 29 mmol/L Final    Glucose 11/01/2024 109  70 - 110 mg/dL Final    BUN 11/01/2024 26 (H)  8 - 23 mg/dL Final    Creatinine 11/01/2024 1.5 (H)  0.5 - 1.4 mg/dL Final    Calcium 11/01/2024 8.9  8.7 - 10.5 mg/dL Final    Total Protein 11/01/2024 7.5  6.0 - 8.4 g/dL Final    Albumin 11/01/2024 3.5  3.5 - 5.2 g/dL Final    Total Bilirubin 11/01/2024 0.5  0.1 - 1.0 mg/dL Final    Comment: For infants and newborns, interpretation of results should be based  on gestational age, weight and in agreement with clinical  observations.    Premature Infant recommended reference ranges:  Up to 24 hours.............<8.0 mg/dL  Up to 48 hours............<12.0 mg/dL  3-5 days..................<15.0 mg/dL  6-29 days.................<15.0 mg/dL      Alkaline Phosphatase 11/01/2024 105  40 - 150 U/L Final    AST 11/01/2024 14  10 - 40 U/L Final    ALT 11/01/2024 14  10 - 44 U/L Final    eGFR 11/01/2024 49.5 (A)  >60 mL/min/1.73 m^2 Final    Anion Gap 11/01/2024 16  8 - 16 mmol/L Final    Magnesium 11/01/2024 1.8  1.6 - 2.6 mg/dL Final         Imaging:     PET/CT 10/17/24    Quality of the study: Adequate.     Head and neck: Small mildly hypermetabolic focus in the right parotid gland with maximum SUV of 3.3, favored to represent a reactive lymph node.  No pathologic lymphadenopathy.     Chest: There are no hypermetabolic lesions worrisome for malignancy.  There are no concerning pulmonary nodules or masses, and there are no pathologically enlarged or hypermetabolic lymph nodes.     Abdomen and pelvis: No abnormal hypermetabolism in the pancreatic tail.  Significant improvement of prior fat stranding and edema surrounding pancreatic tail.  There is new pneumobilia.     Hypermetabolic subcutaneous soft tissue thickening in the midline inferior perineum and medial left thigh (image [phone] and [phone]).  Increased radiotracer uptake in a normal appearing left inguinal  lymph node, likely reactive.     There is physiologic tracer distribution within the abdominal organs and excretion into the genitourinary system.     Bones: There are no hypermetabolic lesions worrisome for malignancy.       Assessment:       1. Hypotension, unspecified hypotension type    2. [place] (acute kidney injury)    3. Hypokalemia    4. Dehydration    5. Pancreatic adenocarcinoma    6. 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 Atrial fibrillation, unspecified type    9. Pulmonary nodules    10. Hypertension, unspecified type    11. Gastroesophageal reflux disease, unspecified whether esophagitis present    12. B12 deficiency               Plan:     Hypotension - Unable to get a BP or pulse rate in clinic  -Pt without cognitive deficit without problem and able to sit up on examination table  -Pt to go to the ER for evaluation  -Pt with H/O A-fib  -Dr Anderson is the pt's cardiologist  -Case discussed with ER physician Dr John Waldron     [place] - Creatinine up to 2.3  -BUN/Cr ratio nearly 20:1  -Likely due to dehydration   -Recommend IV fluids    Hypokalemia - Likely due to diarrhea N/N  -Potassium 3.2  -Pt to go to the ER for repletion     Leukocytosis - Pt received neulasta with cycle one of chemo  -Likely due to neulasta  -Would perform Sepsis work up in ER    Adenocarcinoma of the Pancreas - patient with adenocarcinoma in the tail of the pancreas status post biopsy 09/13/2024  -CT chest, abdomen, and pelvis on 10/03/2024 showed a lesion in the tail of the pancreas as well as multiple pulmonary nodules with 1 nodule measuring at least 12 mm  -Will obtain PET-CT  -Pt seen by Dr Butler in surgical oncology at [facility] 09/25/2024 whom recommended 4 cycles of chemo followed by restaging  -Patient seen by  at [facility] [place] on 10/03/2024 recommending perioperative FOLFIRINOX with elimination of bolus fluorouracil and leucovorin  -Pharmacogenomic showed the patient to  have normal UGT1A1 and DPYD genes  -[employer]ae germline testing negative  -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   Nodule in left hip felt to be trochanteric bursitis  -Will hold cycle 2 currently  -Plan to reduce irinotecan and 5-FU for future treatments  Visit today included increased complexity associated with the care of the episodic problem (chemotherapy) addressed and managing the longitudinal care of the patient due to the serious and/or complex managed problem(s) pancreatic cancer.    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 - pt at increased risk of infection  -Pt with leukocytosis but no source of infection identified  -Will monitor    Pulmonary Nodules - Seen on Ct chest 10/03/24 with largest noduel in left lung  -PET/CT 10/17/24 showed no concerning nodules  -Will monitor    A-fib - pt on eliquis  -Cardiology managing  -Pt will need EKG in the ER  -Will monitor    HTN - pt on amlodipine, sotalol  -BP unable to be assessed  -Pt to go to ER  -Will monitor    GERD - pt on protonix  -Will monitor    B12 Deficiency - low on 8/02/24 at 195pg/mL  -Pt on B12 injection monthly  -Will monitor    Trochanteric Bursitis - in the left hip  -Causing left hip pain and seen on PEt/CT  -Will monitor    Route Chart for Scheduling    Med Onc Chart Routing      Follow up with physician 2 weeks. Pt needs IV fluids today.  Pt needs a CBC, CMP, and Mg tomorrow with chemo to be given as logn as the pt's labs improve.  Pt needs a CBC, CMP, Mg and  in 2 weeks with an appt with [facility] day before chemo.   Follow up with KB    Infusion scheduling note    Injection scheduling note    Labs    Imaging    Pharmacy appointment    Other referrals                Treatment Plan Information   OP GI FOLFIRINOX (oxaliplatin, leucovorin, irinotecan, fluorouracil) Q2W  Stephan Suero MD    Associated diagnosis: Pancreatic adenocarcinoma Stage IA cT1c, cN0, cM0 noted on 10/11/2024   Line of treatment: Neoadjuvant  Treatment Goal: Curative     Upcoming Treatment Dates - OP GI FOLFIRINOX (oxaliplatin, leucovorin, irinotecan, fluorouracil) Q2W     11/6/2024       Chemotherapy       oxaliplatin (ELOXATIN) 85 mg/m2 = 184 mg in D5W 536.8 mL chemo infusion       leucovorin calcium in D5W 250 mL infusion       irinotecan (CAMPTOSAR) in 0.9% NaCl 517.1 mL chemo infusion       fluorouraciL injection       fluorouracil chemo infusion       Antiemetics       palonosetron 0.25mg/dexAMETHasone 12mg in NS IVPB 0.25 mg 50 mL  11/20/2024       Chemotherapy       oxaliplatin (ELOXATIN) 85 mg/m2 = 184 mg in D5W 536.8 mL chemo infusion       leucovorin calcium in D5W 250 mL infusion       irinotecan (CAMPTOSAR) in 0.9% NaCl 517.1 mL chemo infusion       fluorouraciL injection       fluorouracil chemo infusion       Antiemetics       palonosetron 0.25mg/dexAMETHasone 12mg in NS IVPB 0.25 mg 50 mL  12/4/2024       Chemotherapy       oxaliplatin (ELOXATIN) 85 mg/m2 = 184 mg in D5W 536.8 mL chemo infusion       leucovorin calcium in D5W 250 mL infusion       irinotecan (CAMPTOSAR) in 0.9% NaCl 517.1 mL chemo infusion       fluorouraciL injection       fluorouracil chemo infusion       Antiemetics       palonosetron 0.25mg/dexAMETHasone 12mg in NS IVPB 0.25 mg 50 mL  12/18/2024       Chemotherapy       oxaliplatin (ELOXATIN) 85 mg/m2 = 184 mg in D5W 536.8 mL chemo infusion       leucovorin calcium in D5W 250 mL infusion       irinotecan (CAMPTOSAR) in 0.9% NaCl 517.1 mL chemo infusion       fluorouraciL injection       fluorouracil chemo infusion       Antiemetics       palonosetron 0.25mg/dexAMETHasone 12mg in NS IVPB 0.25 mg 50 mL    Supportive Plan Information  IV FLUIDS AND ELECTROLYTES Stephan Suero MD   Associated Diagnosis: Dehydration   noted on 11/5/2024   Line of treatment: Supportive Care   Treatment goal:  Supportive     Upcoming Treatment Dates - IV FLUIDS AND ELECTROLYTES    No upcoming days in selected categories.      Stephan Suero MD  [facility]  Hematology and Oncology  [facility]   [address] [place] [address]   O: [phone]  F: [phone]

## 2024-11-05 ENCOUNTER — TELEPHONE (OUTPATIENT)
Dept: HEMATOLOGY/ONCOLOGY | Facility: CLINIC | Age: 69
End: 2024-11-05

## 2024-11-05 ENCOUNTER — LAB VISIT (OUTPATIENT)
Dept: LAB | Facility: HOSPITAL | Age: 69
End: 2024-11-05
Attending: INTERNAL MEDICINE
Payer: MEDICARE

## 2024-11-05 ENCOUNTER — OFFICE VISIT (OUTPATIENT)
Dept: HEMATOLOGY/ONCOLOGY | Facility: CLINIC | Age: 69
End: 2024-11-05
Payer: MEDICARE

## 2024-11-05 VITALS
HEIGHT: 72 IN | HEART RATE: 87 BPM | RESPIRATION RATE: 16 BRPM | WEIGHT: 186.31 LBS | BODY MASS INDEX: 25.24 KG/M2 | TEMPERATURE: 97 F | OXYGEN SATURATION: 96 %

## 2024-11-05 DIAGNOSIS — Z79.899 IMMUNODEFICIENCY DUE TO CHEMOTHERAPY: ICD-10-CM

## 2024-11-05 DIAGNOSIS — I10 HYPERTENSION, UNSPECIFIED TYPE: ICD-10-CM

## 2024-11-05 DIAGNOSIS — N17.9 AKI (ACUTE KIDNEY INJURY): ICD-10-CM

## 2024-11-05 DIAGNOSIS — I95.9 HYPOTENSION, UNSPECIFIED HYPOTENSION TYPE: Primary | ICD-10-CM

## 2024-11-05 DIAGNOSIS — E87.6 HYPOKALEMIA: ICD-10-CM

## 2024-11-05 DIAGNOSIS — I48.91 ATRIAL FIBRILLATION, UNSPECIFIED TYPE: ICD-10-CM

## 2024-11-05 DIAGNOSIS — C25.9 PANCREATIC ADENOCARCINOMA: ICD-10-CM

## 2024-11-05 DIAGNOSIS — R11.2 NAUSEA AND VOMITING, UNSPECIFIED VOMITING TYPE: ICD-10-CM

## 2024-11-05 DIAGNOSIS — T45.1X5A IMMUNODEFICIENCY DUE TO CHEMOTHERAPY: ICD-10-CM

## 2024-11-05 DIAGNOSIS — E86.0 DEHYDRATION: ICD-10-CM

## 2024-11-05 DIAGNOSIS — D72.829 LEUKOCYTOSIS, UNSPECIFIED TYPE: ICD-10-CM

## 2024-11-05 DIAGNOSIS — D84.821 IMMUNODEFICIENCY DUE TO CHEMOTHERAPY: ICD-10-CM

## 2024-11-05 DIAGNOSIS — R91.8 PULMONARY NODULES: ICD-10-CM

## 2024-11-05 DIAGNOSIS — E53.8 B12 DEFICIENCY: ICD-10-CM

## 2024-11-05 DIAGNOSIS — K21.9 GASTROESOPHAGEAL REFLUX DISEASE, UNSPECIFIED WHETHER ESOPHAGITIS PRESENT: ICD-10-CM

## 2024-11-05 PROBLEM — K52.9 ENTERITIS: Status: ACTIVE | Noted: 2024-11-05

## 2024-11-05 PROBLEM — D84.9 IMMUNOCOMPROMISED PATIENT: Status: ACTIVE | Noted: 2024-11-05

## 2024-11-05 LAB
ALBUMIN SERPL BCP-MCNC: 3.2 G/DL (ref 3.5–5.2)
ALP SERPL-CCNC: 96 U/L (ref 40–150)
ALT SERPL W/O P-5'-P-CCNC: 17 U/L (ref 10–44)
ANION GAP SERPL CALC-SCNC: 18 MMOL/L (ref 8–16)
AST SERPL-CCNC: 19 U/L (ref 10–40)
BASOPHILS # BLD AUTO: 0.03 K/UL (ref 0–0.2)
BASOPHILS NFR BLD: 0.2 % (ref 0–1.9)
BILIRUB SERPL-MCNC: 0.4 MG/DL (ref 0.1–1)
BUN SERPL-MCNC: 40 MG/DL (ref 8–23)
CALCIUM SERPL-MCNC: 8.5 MG/DL (ref 8.7–10.5)
CANCER AG19-9 SERPL-ACNC: 18.5 U/ML (ref 0–40)
CHLORIDE SERPL-SCNC: 98 MMOL/L (ref 95–110)
CO2 SERPL-SCNC: 15 MMOL/L (ref 23–29)
CREAT SERPL-MCNC: 2.3 MG/DL (ref 0.5–1.4)
DIFFERENTIAL METHOD BLD: ABNORMAL
EOSINOPHIL # BLD AUTO: 0 K/UL (ref 0–0.5)
EOSINOPHIL NFR BLD: 0.3 % (ref 0–8)
ERYTHROCYTE [DISTWIDTH] IN BLOOD BY AUTOMATED COUNT: 14.8 % (ref 11.5–14.5)
EST. GFR  (NO RACE VARIABLE): 29.6 ML/MIN/1.73 M^2
GLUCOSE SERPL-MCNC: 127 MG/DL (ref 70–110)
HCT VFR BLD AUTO: 45.4 % (ref 40–54)
HGB BLD-MCNC: 15.2 G/DL (ref 14–18)
IMM GRANULOCYTES # BLD AUTO: 0.6 K/UL (ref 0–0.04)
IMM GRANULOCYTES NFR BLD AUTO: 4.6 % (ref 0–0.5)
LYMPHOCYTES # BLD AUTO: 2.1 K/UL (ref 1–4.8)
LYMPHOCYTES NFR BLD: 16.1 % (ref 18–48)
MCH RBC QN AUTO: 26.5 PG (ref 27–31)
MCHC RBC AUTO-ENTMCNC: 33.5 G/DL (ref 32–36)
MCV RBC AUTO: 79 FL (ref 82–98)
MONOCYTES # BLD AUTO: 1.3 K/UL (ref 0.3–1)
MONOCYTES NFR BLD: 9.8 % (ref 4–15)
NEUTROPHILS # BLD AUTO: 9 K/UL (ref 1.8–7.7)
NEUTROPHILS NFR BLD: 69 % (ref 38–73)
NRBC BLD-RTO: 0 /100 WBC
PLATELET # BLD AUTO: 243 K/UL (ref 150–450)
PLATELET BLD QL SMEAR: ABNORMAL
PMV BLD AUTO: 9.3 FL (ref 9.2–12.9)
POTASSIUM SERPL-SCNC: 3.2 MMOL/L (ref 3.5–5.1)
PROT SERPL-MCNC: 7.1 G/DL (ref 6–8.4)
RBC # BLD AUTO: 5.74 M/UL (ref 4.6–6.2)
SODIUM SERPL-SCNC: 131 MMOL/L (ref 136–145)
WBC # BLD AUTO: 13.11 K/UL (ref 3.9–12.7)

## 2024-11-05 PROCEDURE — 99215 OFFICE O/P EST HI 40 MIN: CPT | Mod: S$PBB,,, | Performed by: INTERNAL MEDICINE

## 2024-11-05 PROCEDURE — 85025 COMPLETE CBC W/AUTO DIFF WBC: CPT | Mod: PN | Performed by: INTERNAL MEDICINE

## 2024-11-05 PROCEDURE — 80053 COMPREHEN METABOLIC PANEL: CPT | Mod: PN | Performed by: INTERNAL MEDICINE

## 2024-11-05 PROCEDURE — 86301 IMMUNOASSAY TUMOR CA 19-9: CPT | Performed by: INTERNAL MEDICINE

## 2024-11-05 PROCEDURE — 99213 OFFICE O/P EST LOW 20 MIN: CPT | Mod: PBBFAC,PN | Performed by: INTERNAL MEDICINE

## 2024-11-05 PROCEDURE — 36415 COLL VENOUS BLD VENIPUNCTURE: CPT | Mod: PN | Performed by: INTERNAL MEDICINE

## 2024-11-05 PROCEDURE — G2211 COMPLEX E/M VISIT ADD ON: HCPCS | Mod: S$PBB,,, | Performed by: INTERNAL MEDICINE

## 2024-11-05 PROCEDURE — 99999 PR PBB SHADOW E&M-EST. PATIENT-LVL III: CPT | Mod: PBBFAC,,, | Performed by: INTERNAL MEDICINE

## 2024-11-05 RX ORDER — HEPARIN 100 UNIT/ML
500 SYRINGE INTRAVENOUS
OUTPATIENT
Start: 2024-11-05

## 2024-11-05 RX ORDER — ONDANSETRON 4 MG/1
4 TABLET, ORALLY DISINTEGRATING ORAL EVERY 6 HOURS PRN
Qty: 60 TABLET | Refills: 6 | Status: SHIPPED | OUTPATIENT
Start: 2024-11-05

## 2024-11-05 RX ORDER — POTASSIUM CHLORIDE 20 MEQ/1
20 TABLET, EXTENDED RELEASE ORAL 2 TIMES DAILY
Qty: 30 TABLET | Refills: 11 | Status: ON HOLD | OUTPATIENT
Start: 2024-11-05 | End: 2024-11-09

## 2024-11-05 RX ORDER — SODIUM CHLORIDE 0.9 % (FLUSH) 0.9 %
10 SYRINGE (ML) INJECTION
OUTPATIENT
Start: 2024-11-05

## 2024-11-05 NOTE — TELEPHONE ENCOUNTER
Pharmacy calling to clarify that the quantity of RX potassium chloride supposed to be 60 not 30 if frequency is BID. Corrected to 60. V/u

## 2024-11-06 PROBLEM — Z79.899 IMMUNOSUPPRESSION DUE TO DRUG THERAPY: Status: ACTIVE | Noted: 2024-11-06

## 2024-11-06 PROBLEM — I95.9 HYPOTENSION: Status: ACTIVE | Noted: 2024-11-06

## 2024-11-06 PROBLEM — D84.821 IMMUNOSUPPRESSION DUE TO DRUG THERAPY: Status: ACTIVE | Noted: 2024-11-06

## 2024-11-06 PROBLEM — E88.09 HYPOALBUMINEMIA: Status: ACTIVE | Noted: 2024-11-06

## 2024-11-06 PROBLEM — R19.7 NAUSEA VOMITING AND DIARRHEA: Status: ACTIVE | Noted: 2024-11-06

## 2024-11-06 PROBLEM — R11.2 NAUSEA VOMITING AND DIARRHEA: Status: ACTIVE | Noted: 2024-11-06

## 2024-11-06 PROBLEM — A04.72 C. DIFFICILE COLITIS: Status: ACTIVE | Noted: 2024-11-06

## 2024-11-09 PROBLEM — K52.9 ENTERITIS: Status: RESOLVED | Noted: 2024-11-05 | Resolved: 2024-11-09

## 2024-11-09 PROBLEM — R50.9 FEVER: Status: ACTIVE | Noted: 2024-11-09

## 2024-11-12 ENCOUNTER — TELEPHONE (OUTPATIENT)
Dept: HEMATOLOGY/ONCOLOGY | Facility: CLINIC | Age: 69
End: 2024-11-12
Payer: MEDICARE

## 2024-11-12 NOTE — TELEPHONE ENCOUNTER
"Called and spoke with pt's wife in regards to confirming if he was still coming to his appt, due to never checking in for labs. Pt's wife stated "He just got out the hospital and is dealing with C. Diff and the flu. It would be better to push things back." I voiced understanding. Pt is already scheduled on 11/19/2024 for labs and follow up with Mrs. Truman Gr NP. Pt's wife verbalized understanding. Appt reminder letters mailed and sent.   "

## 2024-11-14 ENCOUNTER — TELEPHONE (OUTPATIENT)
Dept: HEMATOLOGY/ONCOLOGY | Facility: CLINIC | Age: 69
End: 2024-11-14
Payer: MEDICARE

## 2024-11-14 NOTE — TELEPHONE ENCOUNTER
----- Message from [employer] sent at 11/14/2024 10:43 AM CST -----  Type: Needs Medical Advice  Who Called:  kayy  Best Call Back Number: [phone]  Medicattion :  loperamide 2 mg     Additional Information: kayy states that above medication was discontinued and wants to know  why , please call to further discuss thank  you .

## 2024-11-14 NOTE — TELEPHONE ENCOUNTER
Spoke with Dustin Cassidy regarding why his prescription for imodium was not refilled. Advised patient that medication was discontinued by hospital physician. No further questions/concerns.

## 2024-11-14 NOTE — TELEPHONE ENCOUNTER
----- Message from Major sent at 11/14/2024 10:20 AM CST -----  Regarding: Pharm Auth  Type:  Pharmacy Calling to Clarify an RX    Name of Caller:Mayelin    Pharmacy Name    [facility] [address]  [address] [address]  Phone: [phone] Fax: [phone]          Prescription Name:Loperamide  2 mg    What do they need to clarify?:never received RX      Best Call Back Number:[phone]    Additional Information: never received new RX after pts other was canceled.  Pt was looking for his prescriptions.      Please advise -- Thank you

## 2024-11-18 NOTE — PROGRESS NOTES
PATIENT: Fred Benjamin  MRN: [id number]  DATE: 11/18/2024      Diagnosis:   1. Malignant neoplasm of pancreas, unspecified location of malignancy      Chief Complaint: Clearance for C2 FOLFIRINOX      Oncologic History:   Oncology History   Malignant neoplasm of pancreas   10/11/2024 Initial Diagnosis    Pancreatic adenocarcinoma     10/11/2024 Cancer Staged    Staging form: Exocrine Pancreas, AJCC 8th Edition  - Clinical: Stage IA (cT1c, cN0, cM0)     10/23/2024 -  Chemotherapy    Treatment Summary   Plan Name: OP GI FOLFIRINOX (oxaliplatin, leucovorin, irinotecan, fluorouracil) Q2W   Treatment Goal: Curative  Status: Active  Start Date: 10/23/2024  End Date: 10/23/2025 (Planned)  Provider: Stephan Suero MD  Chemotherapy: fluorouraciL injection 865 mg, 400 mg/m2 = 865 mg, Intravenous, Clinic/HOD 1 time, 1 of 26 cycles  Dose modification: 360 mg/m2 (original dose 400 mg/m2, Cycle 26, Reason: Other (see comments), Comment: Diarrhea)  Administration: 865 mg (10/23/2024)  irinotecan (CAMPTOSAR) 380 mg in 0.9% NaCl 584 mL chemo infusion, 388 mg, Intravenous, Clinic/HOD 1 time, 1 of 26 cycles  Dose modification: 165 mg/m2 (original dose 180 mg/m2, Cycle 26, Reason: Other (see comments), Comment: Diarrhea)  Administration: 380 mg (10/23/2024)  oxaliplatin (ELOXATIN) 85 mg/m2 = 184 mg in D5W 601.8 mL chemo infusion, 85 mg/m2 = 184 mg, Intravenous, Clinic/HOD 1 time, 1 of 26 cycles  Administration: 184 mg (10/23/2024)  fluorouracil (Adrucil) 5,000 mg in 100 mL chemo infusion, 5,185 mg, Intravenous, Over 46 hours, 1 of 26 cycles  Dose modification: 2,000 mg/m2 (original dose 2,400 mg/m2, Cycle 26, Reason: Other (see comments), Comment: Diarrhea)  Administration: 5,000 mg (10/23/2024)             Subjective:    Interval History:  Mr. Benjamin is a 69 y.o. male with Arthritis, A-fib, GERD, HTN, BHUMIKA who presents for pancreatic cancer.  Since the last clinic visit the patient was admitted to [facility]  (11/05 - 11/0824) for tx  associated N/V/D with abdominal pain and poor oral intake. He was dx with C-diff/[place]/Norovirus & the flu (Influenza A).  Patient was given antibiotics (oral vanc) & fluids + electrolytes. Discharged on a 10 day course of Vanc & 5 day course of Tamiflu. He reports continued diarrhea despite completing Vancomycin.  He remains weak, having HA's, cold sweats & abdominal discomfort.  He denies fevers, CP, cough, SOB, constipation.  The patient denies fever, night sweats, new lumps or bumps, easy bruising or bleeding.       Prior History:   Pt initially underwetn US abdomen 8/02/24 for work up of abdominal pain.  US showed mild intrahepatic ductal dilatation.  PT underwent EUS on 9/13/24 showing for stones in the common bile duct; mass in the pancreatic tail which was biopsied; cystic lesion in the pancreatic body.  Biopsy returned positive for adenocarcinoma.  The patient was discussed at tumor board on 09/18/2024 with recommendation for referral to Surgical Oncology and Medical Oncology.  The patient underwent CT chest, abdomen, and pelvis on 09/1924 showing a small pleural plaque of platelike atelectasis anteriorly in the right hemithorax; intra and extrahepatic biliary duct dilatation; faint density within the common duct which could represent faintly calcified stones, sludge, or debris; large amount of pancreatic fat stranding and fluid around the tail of the pancreas.  Patient underwent ERCP on 09/20/2024 showing dilatation of the entire main bile duct with stone causing obstruction.  Patient underwent ampullary dilatation and biliary sphincterotomy with the biliary tree swept.  Patient met with surgical oncologist Dr. Butler on 09/25/2024 whom recommended 4 cycles chemotherapy followed by restaging and consideration for distal pancreatectomy.  The patient went to [facility] on 10/03/2024 and underwent CT chest, abdomen, and pelvis showing Variable sized pulmonary nodules predominantly within the left lung with a  dominant lesion having irregular border measuring 12 mm in the next most prominent nodule measuring 7 mm; mild emphysema; bilobed fluid or 6 appearing lesion in the pancreatic tail; and chronic dilatation of the common bile duct.  The patient met with medical oncologist Dr. Olson on 10/03/2024 whom recommended 12 cycles of FOLFIRINOX and perioperative management.    Past Medical History:   Past Medical History:   Diagnosis Date    Arthritis     Atrial fibrillation     Cancer 03/2017    Bladder    GERD (gastroesophageal reflux disease)     Headache(784.0)     Hypertension     Pacemaker     Pancreatic cancer     Rash     Sleep apnea     no cpap       Past Surgical HIstory:   Past Surgical History:   Procedure Laterality Date    ABCESS DRAINAGE  06/01/2018    I&D with irrigation abcess left lower back    ABLATION      ATRIAL ABLATION SURGERY      x 2    BLADDER TUMOR EXCISION  03/14/2017     cysto/Cancer    CARDIAC PACEMAKER PLACEMENT      CARDIOVERSION      carpel tunnel Bilateral     CERVICAL FUSION      x3    CHOLECYSTECTOMY      COLONOSCOPY      ENDOSCOPIC ULTRASOUND OF UPPER GASTROINTESTINAL TRACT Left 09/13/2024    Procedure: ULTRASOUND, UPPER GI TRACT, ENDOSCOPIC;  Surgeon: Gerald Ochoa MD;  Location: [facility] ENDO;  Service: Endoscopy;  Laterality: Left;    ERCP N/A 09/20/2024    Procedure: ERCP (ENDOSCOPIC RETROGRADE CHOLANGIOPANCREATOGRAPHY);  Surgeon: Mariano Ferreira III, MD;  Location: [facility] ENDO;  Service: Endoscopy;  Laterality: N/A;    ESOPHAGUS SURGERY      ting and then reversal    FOOT FRACTURE SURGERY      FRACTURE SURGERY      INCISION AND DRAINAGE OF ABSCESS Left 06/01/2018    Procedure: INCISION AND DRAINAGE, ABSCESS - left, lower back;  Surgeon: Irving Edouard MD;  Location: [facility];  Service: Orthopedics;  Laterality: Left;    INSERTION OF TUNNELED CENTRAL VENOUS CATHETER (CVC) WITH SUBCUTANEOUS PORT N/A 10/18/2024    Procedure: [id number]-[id number];  Surgeon: Irving Alicia MD;   Location: [facility] OR;  Service: General;  Laterality: N/A;    KNEE ARTHROSCOPY W/ MENISCAL REPAIR Left     ROTATOR CUFF REPAIR Left        Family History:   Family History   Problem Relation Name Age of Onset    Coronary artery disease Mother      Diabetes Mother      Cancer Father          Lymphoma       Social History:  reports that he has quit smoking. His smoking use included cigars and cigarettes. He has a 20 pack-year smoking history. He has never used smokeless tobacco. He reports that he does not drink alcohol and does not use drugs.    Allergies:  Review of patient's allergies indicates:   Allergen Reactions    Cleocin [clindamycin hcl] Shortness Of Breath     Constipation    Amiodarone     Amiodarone analogues      Affected thyroid    Dabigatran etexilate     Rythmol [propafenone]      Weight loss/ nausea       Medications:  Current Outpatient Medications   Medication Sig Dispense Refill    ELIQUIS 5 mg Tab Take 5 mg by mouth 2 (two) times daily.       LIDOcaine-prilocaine (EMLA) cream Apply topically as needed (apply 30 min prior to chemotherapy). 30 g 0    magic mouthwash diphen/antac/lidoc/nysta Swish 10 mLs by mouth, then spit or swallow, every 4 to 6 hours as needed for mouth pain/ulcers/yeast 240 mL 1    morphine (MS CONTIN) 30 MG 12 hr tablet Take 60 mg by mouth 2 (two) times daily.      OLANZapine (ZYPREXA) 5 MG tablet Take 1 tablet by mouth nightly on days 1-3 of each chemotherapy cycle. 6 tablet 11    ondansetron (ZOFRAN-ODT) 4 MG TbDL Take 1 tablet (4 mg total) by mouth every 6 (six) hours as needed. 60 tablet 6    oxyCODONE (ROXICODONE) 20 mg Tab immediate release tablet Take 1 tablet (20 mg total) by mouth every 6 (six) hours as needed for Pain. 28 tablet 0    potassium chloride SA (K-DUR,KLOR-CON) 20 MEQ tablet Take 1 tablet (20 mEq total) by mouth 2 (two) times daily. 60 tablet 0    prochlorperazine (COMPAZINE) 5 MG tablet Take 1 tablet (5 mg total) by mouth every 6 (six) hours as needed for  Nausea. 20 tablet 5    sotaloL (BETAPACE) 160 MG Tab Take 160 mg by mouth once daily.       No current facility-administered medications for this visit.       Review of Systems   Constitutional:  Negative for chills, diaphoresis, fatigue, fever and unexpected weight change.   Respiratory:  Negative for cough and shortness of breath.    Cardiovascular:  Negative for chest pain and palpitations.   Gastrointestinal:  Positive for diarrhea, nausea and vomiting. Negative for abdominal pain and constipation.   Musculoskeletal:         Left hip pain   Skin:  Negative for color change and rash.   Neurological:  Positive for weakness. Negative for headaches.   Hematological:  Negative for adenopathy. Does not bruise/bleed easily.       ECOG Performance Status: 1   Objective:    Weight:  Gain of 2 1/2 pounds in 2 weeks  Vitals:   Vitals:    11/19/24 0836   BP: 98/60   BP Location: Left arm   Pulse: 75   Resp: 16   Temp: 96.7 °F (35.9 °C)   TempSrc: Temporal   SpO2: 96%   Weight: 85.7 kg (188 lb 15 oz)   Height: 6' (1.829 m)       Physical Exam  Vitals reviewed.   Constitutional:       General: He is not in acute distress.     Appearance: He is well-developed. He is ill-appearing. He is not diaphoretic.   HENT:      Head: Normocephalic and atraumatic.   Eyes:      Conjunctiva/sclera: Conjunctivae normal.   Cardiovascular:      Rate and Rhythm: Normal rate and regular rhythm.      Heart sounds: Normal heart sounds. No murmur heard.     No friction rub. No gallop.   Pulmonary:      Effort: Pulmonary effort is normal. No respiratory distress.      Breath sounds: Normal breath sounds. No wheezing or rales.   Chest:      Chest wall: No tenderness.   Abdominal:      General: Bowel sounds are normal. There is no distension.      Palpations: Abdomen is soft. There is no mass.      Tenderness: There is abdominal tenderness (RUQ). There is no rebound.   Musculoskeletal:      Cervical back: Neck supple.      Right lower leg: No edema.       Left lower leg: No edema.      Comments: Generalized weakness; use of w/c for ambulation   Lymphadenopathy:      Cervical: No cervical adenopathy.      Upper Body:      Right upper body: No supraclavicular or axillary adenopathy.      Left upper body: No supraclavicular or axillary adenopathy.   Skin:     General: Skin is warm and dry.      Coloration: Skin is pale.      Findings: No erythema or rash.   Neurological:      Mental Status: He is alert and oriented to person, place, and time.   Psychiatric:         Behavior: Behavior normal.         Thought Content: Thought content normal.         Laboratory Data:  Lab Visit on 11/19/2024   Component Date Value Ref Range Status    WBC 11/19/2024 8.32  3.90 - 12.70 K/uL Final    RBC 11/19/2024 4.91  4.60 - 6.20 M/uL Final    Hemoglobin 11/19/2024 13.0 (L)  14.0 - 18.0 g/dL Final    Hematocrit 11/19/2024 39.9 (L)  40.0 - 54.0 % Final    MCV 11/19/2024 81 (L)  82 - 98 fL Final    MCH 11/19/2024 26.5 (L)  27.0 - 31.0 pg Final    MCHC 11/19/2024 32.6  32.0 - 36.0 g/dL Final    RDW 11/19/2024 16.4 (H)  11.5 - 14.5 % Final    Platelets 11/19/2024 442  150 - 450 K/uL Final    MPV 11/19/2024 9.8  9.2 - 12.9 fL Final    Immature Granulocytes 11/19/2024 0.6 (H)  0.0 - 0.5 % Final    Gran # (ANC) 11/19/2024 5.7  1.8 - 7.7 K/uL Final    Immature Grans (Abs) 11/19/2024 0.05 (H)  0.00 - 0.04 K/uL Final    Comment: Mild elevation in immature granulocytes is non specific and   can be seen in a variety of conditions including stress response,   acute inflammation, trauma and pregnancy. Correlation with other   laboratory and clinical findings is essential.      Lymph # 11/19/2024 1.5  1.0 - 4.8 K/uL Final    Mono # 11/19/2024 0.8  0.3 - 1.0 K/uL Final    Eos # 11/19/2024 0.2  0.0 - 0.5 K/uL Final    Baso # 11/19/2024 0.09  0.00 - 0.20 K/uL Final    nRBC 11/19/2024 0  0 /100 WBC Final    Gran % 11/19/2024 68.8  38.0 - 73.0 % Final    Lymph % 11/19/2024 18.1  18.0 - 48.0 % Final    Mono  % 11/19/2024 9.6  4.0 - 15.0 % Final    Eosinophil % 11/19/2024 1.8  0.0 - 8.0 % Final    Basophil % 11/19/2024 1.1  0.0 - 1.9 % Final    Differential Method 11/19/2024 Automated   Final    Sodium 11/19/2024 140  136 - 145 mmol/L Final    Potassium 11/19/2024 4.1  3.5 - 5.1 mmol/L Final    Chloride 11/19/2024 107  95 - 110 mmol/L Final    CO2 11/19/2024 20 (L)  23 - 29 mmol/L Final    Glucose 11/19/2024 135 (H)  70 - 110 mg/dL Final    BUN 11/19/2024 7 (L)  8 - 23 mg/dL Final    Creatinine 11/19/2024 1.1  0.5 - 1.4 mg/dL Final    Calcium 11/19/2024 8.3 (L)  8.7 - 10.5 mg/dL Final    Total Protein 11/19/2024 6.7  6.0 - 8.4 g/dL Final    Albumin 11/19/2024 2.5 (L)  3.5 - 5.2 g/dL Final    Total Bilirubin 11/19/2024 0.5  0.1 - 1.0 mg/dL Final    Comment: For infants and newborns, interpretation of results should be based  on gestational age, weight and in agreement with clinical  observations.    Premature Infant recommended reference ranges:  Up to 24 hours.............<8.0 mg/dL  Up to 48 hours............<12.0 mg/dL  3-5 days..................<15.0 mg/dL  6-29 days.................<15.0 mg/dL      Alkaline Phosphatase 11/19/2024 93  40 - 150 U/L Final    AST 11/19/2024 32  10 - 40 U/L Final    ALT 11/19/2024 21  10 - 44 U/L Final    eGFR 11/19/2024 >60.0  >60 mL/min/1.73 m^2 Final    Anion Gap 11/19/2024 13  8 - 16 mmol/L Final    Magnesium 11/19/2024 1.4 (L)  1.6 - 2.6 mg/dL Final         Imaging:     PET/CT 10/17/24    Quality of the study: Adequate.     Head and neck: Small mildly hypermetabolic focus in the right parotid gland with maximum SUV of 3.3, favored to represent a reactive lymph node.  No pathologic lymphadenopathy.     Chest: There are no hypermetabolic lesions worrisome for malignancy.  There are no concerning pulmonary nodules or masses, and there are no pathologically enlarged or hypermetabolic lymph nodes.     Abdomen and pelvis: No abnormal hypermetabolism in the pancreatic tail.  Significant  improvement of prior fat stranding and edema surrounding pancreatic tail.  There is new pneumobilia.     Hypermetabolic subcutaneous soft tissue thickening in the midline inferior perineum and medial left thigh (image [phone]).  Increased radiotracer uptake in a normal appearing left inguinal lymph node, likely reactive.     There is physiologic tracer distribution within the abdominal organs and excretion into the genitourinary system.     Bones: There are no hypermetabolic lesions worrisome for malignancy.       Assessment:       1. Malignant neoplasm of pancreas, unspecified location of malignancy    2. Diarrhea, unspecified type    3. Chemotherapy induced diarrhea         Plan:   Adenocarcinoma of the Pancreas - patient with adenocarcinoma in the tail of the pancreas status post biopsy 09/13/2024  -CT chest, abdomen, and pelvis on 10/03/2024 showed a lesion in the tail of the pancreas as well as multiple pulmonary nodules with 1 nodule measuring at least 12 mm  -Will obtain PET-CT  -Pt seen by Dr Butler in surgical oncology at [facility] 09/25/2024 whom recommended 4 cycles of chemo followed by restaging  -Patient seen by  at [facility] on 10/03/2024 recommending perioperative FOLFIRINOX with elimination of bolus fluorouracil and leucovorin  -Pharmacogenomic showed the patient to have normal UGT1A1 and DPYD genes  -[employer] germline testing negative  -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   Nodule in left hip felt to be trochanteric bursitis  -Will hold cycle 2 currently  -Plan to reduce irinotecan and 5-FU for future treatments  11/19/24:  Defer C2 x 1 week; hydrate with 1000 ml NS over 2 hours & replete Magnesium with 2 gm IVPB today; f/u in 1 week with Dr. Suero with CBC, CMP, MG  Visit today included increased complexity associated with the  care of the episodic problem (chemotherapy) addressed and managing the longitudinal care of the patient due to the serious and/or complex managed problem(s) pancreatic cancer.    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 - pt at increased risk of infection  -Pt with leukocytosis but no source of infection identified  -Will monitor    Pulmonary Nodules - Seen on Ct chest 10/03/24 with largest noduel in left lung  -PET/CT 10/17/24 showed no concerning nodules  -Will monitor    A-fib - pt on eliquis  -Cardiology managing  -Pt will need EKG in the ER  -Will monitor    HTN - pt on amlodipine, sotalol  -BP unable to be assessed  -Pt to go to ER  -Will monitor    GERD - pt on protonix  -Will monitor    B12 Deficiency - low on 8/02/24 at 195pg/mL  -Pt on B12 injection monthly  -Will monitor    Trochanteric Bursitis - in the left hip  -Causing left hip pain and seen on PEt/CT  -Will monitor    Hx of C-diff  Hospitalized 11/05 - 11/08: Tx with 10 days Vanc & Tamiflu for Flu A post hospitalization   11/19/24:  diarrhea persistent; collect stool for c-diff; discussed with Dr. Suero - Fidaxomicin next line of tx (200 mg p.o. bid x 10 day)       Route Chart for Scheduling    Med Onc Chart Routing      Follow up with physician . F/u in 1 week with Dr. Suero with CBC, CMP, MG   Follow up with KB    Infusion scheduling note   Defer C2 x 1 week; plan to hydrate with 1000 ml NS over 2 hr & Mag 2 gm today - need stool for cdiff   Injection scheduling note    Labs    Imaging    Pharmacy appointment    Other referrals                Treatment Plan Information   OP GI FOLFIRINOX (oxaliplatin, leucovorin, irinotecan, fluorouracil) Q2W  Stephan Suero MD   Associated diagnosis: Malignant neoplasm of pancreas Stage IA cT1c, cN0, cM0 noted on 10/11/2024   Line of treatment: Neoadjuvant  Treatment Goal: Curative     Upcoming Treatment Dates - OP GI FOLFIRINOX (oxaliplatin, leucovorin, irinotecan, fluorouracil) Q2W     11/19/2024       Chemotherapy        oxaliplatin (ELOXATIN) 85 mg/m2 = 177 mg in D5W 535.4 mL chemo infusion       leucovorin calcium 360 mg/m2 = 750 mg in D5W 250 mL infusion       irinotecan (CAMPTOSAR) 165 mg/m2 = 344 mg in 0.9% NaCl 517.2 mL chemo infusion       fluorouraciL injection 750 mg       fluorouracil (Adrucil) 2,000 mg/m2 = 4,160 mg in 0.9% NaCl 100 mL chemo infusion       Antiemetics       palonosetron 0.25mg/dexAMETHasone 12mg in NS IVPB 0.25 mg 50 mL  12/3/2024       Chemotherapy       oxaliplatin (ELOXATIN) 85 mg/m2 = 177 mg in D5W 535.4 mL chemo infusion       leucovorin calcium 360 mg/m2 = 750 mg in D5W 250 mL infusion       irinotecan (CAMPTOSAR) 165 mg/m2 = 344 mg in 0.9% NaCl 517.2 mL chemo infusion       fluorouraciL injection 750 mg       fluorouracil (Adrucil) 2,000 mg/m2 = 4,160 mg in 0.9% NaCl 100 mL chemo infusion       Antiemetics       palonosetron 0.25mg/dexAMETHasone 12mg in NS IVPB 0.25 mg 50 mL  12/17/2024       Chemotherapy       oxaliplatin (ELOXATIN) 85 mg/m2 = 177 mg in D5W 535.4 mL chemo infusion       leucovorin calcium 360 mg/m2 = 750 mg in D5W 250 mL infusion       irinotecan (CAMPTOSAR) 165 mg/m2 = 344 mg in 0.9% NaCl 517.2 mL chemo infusion       fluorouraciL injection 750 mg       fluorouracil (Adrucil) 2,000 mg/m2 = 4,160 mg in 0.9% NaCl 100 mL chemo infusion       Antiemetics       palonosetron 0.25mg/dexAMETHasone 12mg in NS IVPB 0.25 mg 50 mL  12/31/2024       Chemotherapy       oxaliplatin (ELOXATIN) 85 mg/m2 = 177 mg in D5W 535.4 mL chemo infusion       leucovorin calcium 360 mg/m2 = 750 mg in D5W 250 mL infusion       irinotecan (CAMPTOSAR) 165 mg/m2 = 344 mg in 0.9% NaCl 517.2 mL chemo infusion       fluorouraciL injection 750 mg       fluorouracil (Adrucil) 2,000 mg/m2 = 4,160 mg in 0.9% NaCl 100 mL chemo infusion       Antiemetics       palonosetron 0.25mg/dexAMETHasone 12mg in NS IVPB 0.25 mg 50 mL    Supportive Plan Information  IV FLUIDS AND ELECTROLYTES Stephan Suero MD   Associated  Diagnosis: Dehydration   noted on 11/5/2024   Line of treatment: Supportive Care   Treatment goal: Supportive     Upcoming Treatment Dates - IV FLUIDS AND ELECTROLYTES    No upcoming days in selected categories.      SUSAN Chavez, FNP-C  [facility]  30 minutes of total time spent on the encounter, which includes face to face time and non-face to face time preparing to see the patient (eg, review of tests), Obtaining and/or reviewing separately obtained history, Documenting clinical information in the electronic or other health record, Independently interpreting results if documented above (not separately reported) and communicating results to the patient/family/caregiver, or Care coordination (not separately reported).

## 2024-11-19 ENCOUNTER — LAB VISIT (OUTPATIENT)
Dept: LAB | Facility: HOSPITAL | Age: 69
End: 2024-11-19
Attending: NURSE PRACTITIONER
Payer: MEDICARE

## 2024-11-19 ENCOUNTER — OFFICE VISIT (OUTPATIENT)
Dept: HEMATOLOGY/ONCOLOGY | Facility: CLINIC | Age: 69
End: 2024-11-19
Payer: MEDICARE

## 2024-11-19 ENCOUNTER — INFUSION (OUTPATIENT)
Dept: INFUSION THERAPY | Facility: HOSPITAL | Age: 69
End: 2024-11-19
Attending: INTERNAL MEDICINE
Payer: MEDICARE

## 2024-11-19 VITALS
HEIGHT: 72 IN | BODY MASS INDEX: 25.59 KG/M2 | RESPIRATION RATE: 16 BRPM | OXYGEN SATURATION: 96 % | DIASTOLIC BLOOD PRESSURE: 60 MMHG | HEART RATE: 75 BPM | TEMPERATURE: 97 F | WEIGHT: 188.94 LBS | SYSTOLIC BLOOD PRESSURE: 98 MMHG

## 2024-11-19 VITALS
RESPIRATION RATE: 16 BRPM | HEART RATE: 71 BPM | WEIGHT: 188.94 LBS | OXYGEN SATURATION: 96 % | BODY MASS INDEX: 25.59 KG/M2 | SYSTOLIC BLOOD PRESSURE: 134 MMHG | HEIGHT: 72 IN | DIASTOLIC BLOOD PRESSURE: 94 MMHG | TEMPERATURE: 98 F

## 2024-11-19 DIAGNOSIS — R19.7 DIARRHEA, UNSPECIFIED TYPE: ICD-10-CM

## 2024-11-19 DIAGNOSIS — C25.9 PANCREATIC ADENOCARCINOMA: ICD-10-CM

## 2024-11-19 DIAGNOSIS — K52.1 CHEMOTHERAPY INDUCED DIARRHEA: ICD-10-CM

## 2024-11-19 DIAGNOSIS — T45.1X5A CHEMOTHERAPY INDUCED DIARRHEA: ICD-10-CM

## 2024-11-19 DIAGNOSIS — C25.9 MALIGNANT NEOPLASM OF PANCREAS, UNSPECIFIED LOCATION OF MALIGNANCY: Primary | ICD-10-CM

## 2024-11-19 DIAGNOSIS — E86.0 DEHYDRATION: Primary | ICD-10-CM

## 2024-11-19 PROBLEM — Z86.19 HX OF CLOSTRIDIUM DIFFICILE INFECTION: Status: ACTIVE | Noted: 2024-11-06

## 2024-11-19 LAB
ALBUMIN SERPL BCP-MCNC: 2.5 G/DL (ref 3.5–5.2)
ALP SERPL-CCNC: 93 U/L (ref 40–150)
ALT SERPL W/O P-5'-P-CCNC: 21 U/L (ref 10–44)
ANION GAP SERPL CALC-SCNC: 13 MMOL/L (ref 8–16)
AST SERPL-CCNC: 32 U/L (ref 10–40)
BASOPHILS # BLD AUTO: 0.09 K/UL (ref 0–0.2)
BASOPHILS NFR BLD: 1.1 % (ref 0–1.9)
BILIRUB SERPL-MCNC: 0.5 MG/DL (ref 0.1–1)
BUN SERPL-MCNC: 7 MG/DL (ref 8–23)
CALCIUM SERPL-MCNC: 8.3 MG/DL (ref 8.7–10.5)
CANCER AG19-9 SERPL-ACNC: 27 U/ML (ref 0–40)
CHLORIDE SERPL-SCNC: 107 MMOL/L (ref 95–110)
CO2 SERPL-SCNC: 20 MMOL/L (ref 23–29)
CREAT SERPL-MCNC: 1.1 MG/DL (ref 0.5–1.4)
DIFFERENTIAL METHOD BLD: ABNORMAL
EOSINOPHIL # BLD AUTO: 0.2 K/UL (ref 0–0.5)
EOSINOPHIL NFR BLD: 1.8 % (ref 0–8)
ERYTHROCYTE [DISTWIDTH] IN BLOOD BY AUTOMATED COUNT: 16.4 % (ref 11.5–14.5)
EST. GFR  (NO RACE VARIABLE): >60 ML/MIN/1.73 M^2
GLUCOSE SERPL-MCNC: 135 MG/DL (ref 70–110)
HCT VFR BLD AUTO: 39.9 % (ref 40–54)
HGB BLD-MCNC: 13 G/DL (ref 14–18)
IMM GRANULOCYTES # BLD AUTO: 0.05 K/UL (ref 0–0.04)
IMM GRANULOCYTES NFR BLD AUTO: 0.6 % (ref 0–0.5)
LYMPHOCYTES # BLD AUTO: 1.5 K/UL (ref 1–4.8)
LYMPHOCYTES NFR BLD: 18.1 % (ref 18–48)
MAGNESIUM SERPL-MCNC: 1.4 MG/DL (ref 1.6–2.6)
MCH RBC QN AUTO: 26.5 PG (ref 27–31)
MCHC RBC AUTO-ENTMCNC: 32.6 G/DL (ref 32–36)
MCV RBC AUTO: 81 FL (ref 82–98)
MONOCYTES # BLD AUTO: 0.8 K/UL (ref 0.3–1)
MONOCYTES NFR BLD: 9.6 % (ref 4–15)
NEUTROPHILS # BLD AUTO: 5.7 K/UL (ref 1.8–7.7)
NEUTROPHILS NFR BLD: 68.8 % (ref 38–73)
NRBC BLD-RTO: 0 /100 WBC
PLATELET # BLD AUTO: 442 K/UL (ref 150–450)
PMV BLD AUTO: 9.8 FL (ref 9.2–12.9)
POTASSIUM SERPL-SCNC: 4.1 MMOL/L (ref 3.5–5.1)
PROT SERPL-MCNC: 6.7 G/DL (ref 6–8.4)
RBC # BLD AUTO: 4.91 M/UL (ref 4.6–6.2)
SODIUM SERPL-SCNC: 140 MMOL/L (ref 136–145)
WBC # BLD AUTO: 8.32 K/UL (ref 3.9–12.7)

## 2024-11-19 PROCEDURE — A4216 STERILE WATER/SALINE, 10 ML: HCPCS | Mod: PN | Performed by: NURSE PRACTITIONER

## 2024-11-19 PROCEDURE — 83735 ASSAY OF MAGNESIUM: CPT | Mod: PN | Performed by: INTERNAL MEDICINE

## 2024-11-19 PROCEDURE — 36415 COLL VENOUS BLD VENIPUNCTURE: CPT | Mod: PN | Performed by: INTERNAL MEDICINE

## 2024-11-19 PROCEDURE — 99213 OFFICE O/P EST LOW 20 MIN: CPT | Mod: PBBFAC,PN | Performed by: NURSE PRACTITIONER

## 2024-11-19 PROCEDURE — 99999 PR PBB SHADOW E&M-EST. PATIENT-LVL III: CPT | Mod: PBBFAC,,, | Performed by: NURSE PRACTITIONER

## 2024-11-19 PROCEDURE — 86301 IMMUNOASSAY TUMOR CA 19-9: CPT | Performed by: INTERNAL MEDICINE

## 2024-11-19 PROCEDURE — 80053 COMPREHEN METABOLIC PANEL: CPT | Mod: PN | Performed by: INTERNAL MEDICINE

## 2024-11-19 PROCEDURE — 25000003 PHARM REV CODE 250: Mod: PN | Performed by: NURSE PRACTITIONER

## 2024-11-19 PROCEDURE — 99214 OFFICE O/P EST MOD 30 MIN: CPT | Mod: S$PBB,,, | Performed by: NURSE PRACTITIONER

## 2024-11-19 PROCEDURE — 96365 THER/PROPH/DIAG IV INF INIT: CPT | Mod: PN

## 2024-11-19 PROCEDURE — 85025 COMPLETE CBC W/AUTO DIFF WBC: CPT | Mod: PN | Performed by: INTERNAL MEDICINE

## 2024-11-19 PROCEDURE — 96366 THER/PROPH/DIAG IV INF ADDON: CPT | Mod: PN

## 2024-11-19 RX ORDER — SODIUM CHLORIDE 0.9 % (FLUSH) 0.9 %
10 SYRINGE (ML) INJECTION
Status: CANCELLED | OUTPATIENT
Start: 2024-11-19

## 2024-11-19 RX ORDER — SODIUM CHLORIDE 0.9 % (FLUSH) 0.9 %
10 SYRINGE (ML) INJECTION
Status: DISCONTINUED | OUTPATIENT
Start: 2024-11-19 | End: 2024-11-19 | Stop reason: HOSPADM

## 2024-11-19 RX ORDER — LORAZEPAM/0.9% SODIUM CHLORIDE 100MG/0.1L
2 PLASTIC BAG, INJECTION (ML) INTRAVENOUS
Status: CANCELLED | OUTPATIENT
Start: 2024-11-19

## 2024-11-19 RX ORDER — HEPARIN 100 UNIT/ML
500 SYRINGE INTRAVENOUS
Status: CANCELLED | OUTPATIENT
Start: 2024-11-19

## 2024-11-19 RX ORDER — LORAZEPAM/0.9% SODIUM CHLORIDE 100MG/0.1L
2 PLASTIC BAG, INJECTION (ML) INTRAVENOUS
Status: COMPLETED | OUTPATIENT
Start: 2024-11-19 | End: 2024-11-19

## 2024-11-19 RX ADMIN — Medication 10 ML: at 03:11

## 2024-11-19 RX ADMIN — SODIUM CHLORIDE 1000 ML: 9 INJECTION, SOLUTION INTRAVENOUS at 01:11

## 2024-11-19 RX ADMIN — MAGNESIUM SULFATE IN WATER 2 G: 40 INJECTION, SOLUTION INTRAVENOUS at 01:11

## 2024-11-19 NOTE — PLAN OF CARE
Problem: Fatigue  Goal: Improved Activity Tolerance  Description: Rest periods  Intervention: Promote Improved Energy  11/19/2024 1600 by Heraclio Sweeney RN  Flowsheets (Taken 11/19/2024 1600)  Fatigue Management:   paced activity encouraged   activity schedule adjusted  Sleep/Rest Enhancement: relaxation techniques promoted  Activity Management:   Ambulated -L4   Ambulated in [place] - L4  Environmental Support:   calm environment promoted   environmental consistency promoted  11/19/2024 1438 by Heraclio Sweeney RN  Flowsheets (Taken 11/19/2024 1437)  Activity Management:   Ambulated -L4   Ambulated in [place] - L4  Environmental Support:   calm environment promoted   environmental consistency promoted     Problem: Adult Inpatient Plan of Care  Goal: Patient-Specific Goal (Individualized)  11/19/2024 1600 by Heraclio Sweeney RN  Outcome: Progressing  Flowsheets (Taken 11/19/2024 1600)  Individualized Care Needs: Recliner  Anxieties, Fears or Concerns: None  11/19/2024 1438 by Heraclio Sweeney RN  Outcome: Progressing     Problem: Adult Inpatient Plan of Care  Goal: Plan of Care Review  11/19/2024 1600 by Heraclio Sweeney RN  Outcome: Progressing  Flowsheets (Taken 11/19/2024 1600)  Plan of Care Reviewed With: patient  Tolerated treatment with no known distress.  Ambulated from infusion center with steady gait.  11/19/2024 1438 by Heraclio Sweeney RN  Outcome: Progressing

## 2024-11-21 ENCOUNTER — OFFICE VISIT (OUTPATIENT)
Dept: PALLIATIVE MEDICINE | Facility: CLINIC | Age: 69
End: 2024-11-21
Payer: MEDICARE

## 2024-11-21 VITALS
OXYGEN SATURATION: 97 % | RESPIRATION RATE: 18 BRPM | WEIGHT: 194 LBS | BODY MASS INDEX: 27.16 KG/M2 | HEIGHT: 71 IN | SYSTOLIC BLOOD PRESSURE: 134 MMHG | DIASTOLIC BLOOD PRESSURE: 80 MMHG | TEMPERATURE: 98 F | HEART RATE: 91 BPM

## 2024-11-21 DIAGNOSIS — C25.9 MALIGNANT NEOPLASM OF PANCREAS, UNSPECIFIED LOCATION OF MALIGNANCY: ICD-10-CM

## 2024-11-21 DIAGNOSIS — Z51.5 PALLIATIVE CARE BY SPECIALIST: Primary | ICD-10-CM

## 2024-11-21 DIAGNOSIS — G89.3 CANCER RELATED PAIN: ICD-10-CM

## 2024-11-21 DIAGNOSIS — Z71.89 ACP (ADVANCE CARE PLANNING): ICD-10-CM

## 2024-11-21 PROCEDURE — 99214 OFFICE O/P EST MOD 30 MIN: CPT | Mod: PBBFAC,PN | Performed by: NURSE PRACTITIONER

## 2024-11-21 PROCEDURE — 99999 PR PBB SHADOW E&M-EST. PATIENT-LVL IV: CPT | Mod: PBBFAC,,, | Performed by: NURSE PRACTITIONER

## 2024-11-21 PROCEDURE — 99205 OFFICE O/P NEW HI 60 MIN: CPT | Mod: S$PBB,,, | Performed by: NURSE PRACTITIONER

## 2024-11-21 NOTE — PROGRESS NOTES
"  Office Visit  [facility] Palliative Care      Consult Requested By: Dr. Stephan Suero  Reason for Consult: symptom management      ASSESSMENT/PLAN:     Plan/Recommendations:  Fred Albert" was seen today for palliative care.    Diagnoses and all orders for this visit:    Palliative care by specialist  Introduced the role of Palliative Care to provide support for patients with serious illness  Patient is independent ADLS, no in home needs identified today  Provided with the following handouts:  -Tips for better sleep  - Tips to manage SOB  -Tips to manage constipation    Malignant neoplasm of pancreas  -  Continue to follow with Oncology    ACP (advance care planning)  Patient wishes are for DNR, documents provided for review    Cancer related pain  Currently prescribed MS Contin 60 mg bid and Oxycodone 20 mg q4h/prn by Pain Specialist Dr Martin for his chronic back pain, this has also been helpful for his new onset abdominal pain, they will continue to manage for now.  Patient has been on Fentanyl and Methadone in the past      Follow up: 1 month      SUBJECTIVE:     History of Present Illness:  Patient is a 69 y.o. year old male presenting with h/o Pancreatic cancer here to establish care, seen during recent admission to [facility] course below  .     Hospital Course:   69-year-old male with history of pancreatic cancer presented to the emergency department with hypokalemia, acute kidney injury and diarrhea.  Patient was noted to be positive for C diff as well as norovirus.  He was given IV fluids and aggressive electrolyte replacement.  Patient was initiated on oral vancomycin.  Patient clinically improved while in-house and electrolytes have been replaced appropriately.  Diarrhea has significantly improved and patient tolerating oral intake.  Patient with fever spike prior to discharge.  Respiratory viral panel was noted to be positive for influenza a.  Tamiflu was initiated and fever quickly " resolved.  If diarrhea remains resolved today, he will be discharged in stable condition with close outpatient PCP and Oncology follow-up.  He will complete a 10 day course of p.o. vancomycin and 5 day course of Tamiflu on outpatient basis    Palliative Discussion:  Patient is here alone today, he is  with adult children, he remains independent ADL's. Recently diagnosed with Pancreatic cancer, started chemotherapy, had 1 treatment, developed diarrhea with subsequent admission, will resume treatment next week . Main side effects of treatment are hair loss and fatigue    Pain  H/o chronic neck pain and headaches- follows with Dr Martin x 10 yrs  Currently on MS contin 60 mg bid ER  Oxycodone 30 mg every 4 hours - will start soon   New onset abdominal pain- cramping and gas, recovering from diarrhea- first solid stool today   Pain is constant, pain is on right side- 6/10      Has been on methadone and fentanyl patch in the past    Appetite  -poor appetite 2/2 diarrhea, improving slightly     Sleep  -normal sleep pattern     Mood  -stable     Advance Care Planning     Date: 12/02/2024    Code Status  In light of the patients advanced and life limiting illness,I engaged the the patient in a voluntary conversation about the patient's preferences for care  at the very end of life. The patient wishes to have a natural, peaceful death.  Along those lines, the patient does not wish to have CPR or other invasive treatments performed when his heart and/or breathing stops.  ACP document provided for review, he will discuss with his spouse    A total of 20 min was spent on advance care planning, goals of care discussion, emotional support, formulating and communicating prognosis and exploring burden/benefit of various approaches of treatment. This discussion occurred on a fully voluntary basis with the verbal consent of the patient and/or family.        ROS:  Review of Systems   Constitutional:  Positive for fatigue.    Gastrointestinal:  Positive for abdominal pain.       Past Medical History:   Diagnosis Date    Arthritis     Atrial fibrillation     Cancer 03/2017    Bladder    GERD (gastroesophageal reflux disease)     Headache(784.0)     Hypertension     Pacemaker     Pancreatic cancer     Rash     Sleep apnea     no cpap     Past Surgical History:   Procedure Laterality Date    ABCESS DRAINAGE  06/01/2018    I&D with irrigation abcess left lower back    ABLATION      ATRIAL ABLATION SURGERY      x 2    BLADDER TUMOR EXCISION  03/14/2017     cysto/Cancer    CARDIAC PACEMAKER PLACEMENT      CARDIOVERSION      carpel tunnel Bilateral     CERVICAL FUSION      x3    CHOLECYSTECTOMY      COLONOSCOPY      ENDOSCOPIC ULTRASOUND OF UPPER GASTROINTESTINAL TRACT Left 09/13/2024    Procedure: ULTRASOUND, UPPER GI TRACT, ENDOSCOPIC;  Surgeon: Gerald Ochoa MD;  Location: [facility];  Service: Endoscopy;  Laterality: Left;    ERCP N/A 09/20/2024    Procedure: ERCP (ENDOSCOPIC RETROGRADE CHOLANGIOPANCREATOGRAPHY);  Surgeon: Mariano Ferreira III, MD;  Location: [facility] ENDO;  Service: Endoscopy;  Laterality: N/A;    ESOPHAGUS SURGERY      ting and then reversal    FOOT FRACTURE SURGERY      FRACTURE SURGERY      INCISION AND DRAINAGE OF ABSCESS Left 06/01/2018    Procedure: INCISION AND DRAINAGE, ABSCESS - left, lower back;  Surgeon: Irving Edouard MD;  Location: [facility];  Service: Orthopedics;  Laterality: Left;    INSERTION OF TUNNELED CENTRAL VENOUS CATHETER (CVC) WITH SUBCUTANEOUS PORT N/A 10/18/2024    Procedure: [id number];  Surgeon: Irving Alicia MD;  Location: [facility];  Service: General;  Laterality: N/A;    KNEE ARTHROSCOPY W/ MENISCAL REPAIR Left     ROTATOR CUFF REPAIR Left      Family History   Problem Relation Name Age of Onset    Coronary artery disease Mother      Diabetes Mother      Cancer Father          Lymphoma     Review of patient's allergies indicates:   Allergen Reactions    Cleocin  [clindamycin hcl] Shortness Of Breath     Constipation    Amiodarone     Amiodarone analogues      Affected thyroid    Dabigatran etexilate     Rythmol [propafenone]      Weight loss/ nausea     Social Drivers of Health     Tobacco Use: Medium Risk (11/26/2024)    Patient History     Smoking Tobacco Use: Former     Smokeless Tobacco Use: Never     Passive Exposure: Past   Alcohol Use: Not on file   Financial Resource Strain: Low Risk  (11/8/2024)    Overall Financial Resource Strain (CARDIA)     Difficulty of Paying Living Expenses: Not hard at all   Food Insecurity: No Food Insecurity (11/27/2024)    Hunger Vital Sign     Worried About Running Out of Food in the Last Year: Never true     Ran Out of Food in the Last Year: Never true   Transportation Needs: No Transportation Needs (11/27/2024)    TRANSPORTATION NEEDS     Transportation : No   Physical Activity: Not on file   Stress: No Stress Concern Present (11/8/2024)    [employer] - Occupational Stress Questionnaire     Feeling of Stress : Not at all   Housing Stability: Low Risk  (11/27/2024)    Housing Stability Vital Sign     Unable to Pay for Housing in the Last Year: No     Homeless in the Last Year: No   Recent Concern: Housing Stability - High Risk (11/7/2024)    Housing Stability Vital Sign     Unable to Pay for Housing in the Last Year: No     Homeless in the Last Year: Yes   Depression: Low Risk  (11/21/2024)    Depression     Last PHQ-4: Flowsheet Data: 0   Utilities: Not At Risk (11/27/2024)    C Utilities     Threatened with loss of utilities: No   Health Literacy: Adequate Health Literacy (11/8/2024)     Health Literacy     Frequency of need for help with medical instructions: Never   Social Isolation: Not on file            OBJECTIVE:     Physical Exam:  Vitals: Temp: 97.5 °F (36.4 °C) (11/21/24 0934)  Pulse: 91 (11/21/24 0934)  Resp: 18 (11/21/24 0934)  BP: 134/80 (11/21/24 0934)  SpO2: 97 % (11/21/24  0934)  Physical Exam  HENT:      Head: Normocephalic.      Mouth/Throat:      Mouth: Mucous membranes are moist.   Pulmonary:      Effort: Pulmonary effort is normal.   Abdominal:      General: There is no distension.   Musculoskeletal:         General: Normal range of motion.      Cervical back: Normal range of motion.   Skin:     General: Skin is warm and dry.   Neurological:      Mental Status: He is alert and oriented to person, place, and time.   Psychiatric:         Mood and Affect: Mood normal.           Review of Symptoms      Symptom Assessment (ESAS 0-10 Scale)  Pain:  0  Dyspnea:  0  Anxiety:  0  Nausea:  0  Depression:  0  Anorexia:  0  Fatigue:  0  Insomnia:  0  Restlessness:  0  Agitation:  0         ECOG Performance Status ndGndrndanddndend:nd nd2nd Living Arrangements:  Lives with spouse    Psychosocial/Cultural:   See Palliative Psychosocial Note: Yes  **Primary  to Follow**  Palliative Care  Consult: No      Advance Care Planning   Advance Directives:   Living Will: No    [place]: No    Do Not Resuscitate Status: Yes      Decision Making:  Patient answered questions  Goals of Care: The patient endorses that what is most important right now is to focus on symptom/pain control    Accordingly, we have decided that the best plan to meet the patient's goals includes continuing with treatment          LA  reviewed and summarized:      Scheduled medications for symptom control:    PRN medications for symptom control:      Medications:    Current Outpatient Medications:     ELIQUIS 5 mg Tab, Take 5 mg by mouth 2 (two) times daily. , Disp: , Rfl:     morphine (MS CONTIN) 60 MG 12 hr tablet, Take 60 mg by mouth 2 (two) times daily., Disp: , Rfl:     ondansetron (ZOFRAN-ODT) 4 MG TbDL, Take 1 tablet (4 mg total) by mouth every 6 (six) hours as needed (for nausea)., Disp: , Rfl:     oxyCODONE (ROXICODONE) 20 mg Tab immediate release tablet, Take 1 tablet (20 mg total) by mouth every 6 (six)  hours., Disp: , Rfl:     oxyCODONE (ROXICODONE) 30 MG Tab, Take 30 mg by mouth 4 (four) times daily as needed. *never started/purchased - rx post dated for 11/27/24 by Physician, Disp: , Rfl:     potassium chloride SA (K-DUR,KLOR-CON) 20 MEQ tablet, Take 1 tablet (20 mEq total) by mouth once daily., Disp: , Rfl:     sotaloL (BETAPACE AF) 160 MG Tab, Take 160 mg by mouth 2 (two) times daily., Disp: , Rfl:     Labs:  CBC:   WBC   Date Value Ref Range Status   11/28/2024 9.20 3.90 - 12.70 K/uL Final     Hemoglobin   Date Value Ref Range Status   11/28/2024 10.9 (L) 14.0 - 18.0 g/dL Final     Hematocrit   Date Value Ref Range Status   11/28/2024 33.6 (L) 40.0 - 54.0 % Final     MCV   Date Value Ref Range Status   11/28/2024 80 (L) 82 - 98 fL Final     Platelets   Date Value Ref Range Status   11/28/2024 349 150 - 450 K/uL Final       LFT:   Lab Results   Component Value Date    AST 56 11/28/2024    ALKPHOS 95 11/28/2024    BILITOT 0.5 11/28/2024       Albumin:   Albumin   Date Value Ref Range Status   11/28/2024 3.3 (L) 3.5 - 5.2 g/dL Final     Protein:   Total Protein   Date Value Ref Range Status   11/28/2024 6.9 6.0 - 8.4 g/dL Final         60 minutes of total time spent on the encounter, which includes face to face time and non-face to face time preparing to see the patient (eg, review of tests), Obtaining and/or reviewing separately obtained history, Documenting clinical information in the electronic or other health record, Independently interpreting results if documented above (not separately reported) and communicating results to the patient/family/caregiver, or Care coordination (not separately reported).    20 minutes spent in discussing ACP    Signature: Sylvie Solares NP

## 2024-11-26 ENCOUNTER — TELEPHONE (OUTPATIENT)
Dept: HEMATOLOGY/ONCOLOGY | Facility: CLINIC | Age: 69
End: 2024-11-26
Payer: MEDICARE

## 2024-11-26 PROBLEM — E87.5 HYPERKALEMIA: Status: ACTIVE | Noted: 2024-11-26

## 2024-11-26 PROBLEM — J96.01 ACUTE HYPOXIC RESPIRATORY FAILURE: Status: ACTIVE | Noted: 2024-11-26

## 2024-11-26 PROBLEM — R41.82 AMS (ALTERED MENTAL STATUS): Status: ACTIVE | Noted: 2024-11-26

## 2024-11-26 PROBLEM — M43.6 NECK STIFFNESS: Status: ACTIVE | Noted: 2024-11-26

## 2024-11-26 PROBLEM — F11.20 OPIOID DEPENDENCE: Status: ACTIVE | Noted: 2024-11-26

## 2024-11-26 NOTE — TELEPHONE ENCOUNTER
Spoke to wife, he is in ER now and they are admitting for severe pain, fever and flash pulmonary edema . Truman NEGRON notifed as had a visit today and infusion notified for treatment scheduled tomorrow.

## 2024-11-29 ENCOUNTER — TELEPHONE (OUTPATIENT)
Dept: HEMATOLOGY/ONCOLOGY | Facility: CLINIC | Age: 69
End: 2024-11-29
Payer: MEDICARE

## 2024-11-29 NOTE — TELEPHONE ENCOUNTER
Spoke to patient to check on him after d/c from [facility] yesterday. His pain is better. He would like to set up next infusion around the 9th to give him self some time to improved. Scheduled to see Np on 12/6 with lab prior and notified infusion of the plan.

## 2024-12-06 ENCOUNTER — LAB VISIT (OUTPATIENT)
Dept: LAB | Facility: HOSPITAL | Age: 69
End: 2024-12-06
Attending: INTERNAL MEDICINE
Payer: MEDICARE

## 2024-12-06 ENCOUNTER — OFFICE VISIT (OUTPATIENT)
Dept: HEMATOLOGY/ONCOLOGY | Facility: CLINIC | Age: 69
End: 2024-12-06
Payer: MEDICARE

## 2024-12-06 VITALS
WEIGHT: 193.13 LBS | SYSTOLIC BLOOD PRESSURE: 104 MMHG | TEMPERATURE: 98 F | BODY MASS INDEX: 27.04 KG/M2 | HEIGHT: 71 IN | DIASTOLIC BLOOD PRESSURE: 81 MMHG | OXYGEN SATURATION: 95 % | HEART RATE: 88 BPM | RESPIRATION RATE: 17 BRPM

## 2024-12-06 DIAGNOSIS — C25.9 MALIGNANT NEOPLASM OF PANCREAS, UNSPECIFIED LOCATION OF MALIGNANCY: Primary | ICD-10-CM

## 2024-12-06 DIAGNOSIS — Z79.899 IMMUNOSUPPRESSION DUE TO DRUG THERAPY: ICD-10-CM

## 2024-12-06 DIAGNOSIS — Q80.9 ICHTHYOSIS: ICD-10-CM

## 2024-12-06 DIAGNOSIS — N17.9 AKI (ACUTE KIDNEY INJURY): ICD-10-CM

## 2024-12-06 DIAGNOSIS — C25.9 MALIGNANT NEOPLASM OF PANCREAS, UNSPECIFIED LOCATION OF MALIGNANCY: ICD-10-CM

## 2024-12-06 DIAGNOSIS — D84.821 IMMUNOSUPPRESSION DUE TO DRUG THERAPY: ICD-10-CM

## 2024-12-06 LAB
ALBUMIN SERPL BCP-MCNC: 2.7 G/DL (ref 3.5–5.2)
ALP SERPL-CCNC: 96 U/L (ref 40–150)
ALT SERPL W/O P-5'-P-CCNC: 21 U/L (ref 10–44)
ANION GAP SERPL CALC-SCNC: 10 MMOL/L (ref 8–16)
AST SERPL-CCNC: 20 U/L (ref 10–40)
BASOPHILS # BLD AUTO: 0.07 K/UL (ref 0–0.2)
BASOPHILS NFR BLD: 0.8 % (ref 0–1.9)
BILIRUB SERPL-MCNC: 0.3 MG/DL (ref 0.1–1)
BUN SERPL-MCNC: 9 MG/DL (ref 8–23)
CALCIUM SERPL-MCNC: 8.8 MG/DL (ref 8.7–10.5)
CHLORIDE SERPL-SCNC: 102 MMOL/L (ref 95–110)
CO2 SERPL-SCNC: 27 MMOL/L (ref 23–29)
CREAT SERPL-MCNC: 1.3 MG/DL (ref 0.5–1.4)
DIFFERENTIAL METHOD BLD: ABNORMAL
EOSINOPHIL # BLD AUTO: 0.3 K/UL (ref 0–0.5)
EOSINOPHIL NFR BLD: 3.7 % (ref 0–8)
ERYTHROCYTE [DISTWIDTH] IN BLOOD BY AUTOMATED COUNT: 16.2 % (ref 11.5–14.5)
EST. GFR  (NO RACE VARIABLE): 58.7 ML/MIN/1.73 M^2
GLUCOSE SERPL-MCNC: 114 MG/DL (ref 70–110)
HCT VFR BLD AUTO: 37.1 % (ref 40–54)
HGB BLD-MCNC: 11.5 G/DL (ref 14–18)
IMM GRANULOCYTES # BLD AUTO: 0.07 K/UL (ref 0–0.04)
IMM GRANULOCYTES NFR BLD AUTO: 0.8 % (ref 0–0.5)
LYMPHOCYTES # BLD AUTO: 1.7 K/UL (ref 1–4.8)
LYMPHOCYTES NFR BLD: 19.5 % (ref 18–48)
MAGNESIUM SERPL-MCNC: 2.1 MG/DL (ref 1.6–2.6)
MCH RBC QN AUTO: 26 PG (ref 27–31)
MCHC RBC AUTO-ENTMCNC: 31 G/DL (ref 32–36)
MCV RBC AUTO: 84 FL (ref 82–98)
MONOCYTES # BLD AUTO: 0.8 K/UL (ref 0.3–1)
MONOCYTES NFR BLD: 9 % (ref 4–15)
NEUTROPHILS # BLD AUTO: 5.8 K/UL (ref 1.8–7.7)
NEUTROPHILS NFR BLD: 66.2 % (ref 38–73)
NRBC BLD-RTO: 0 /100 WBC
PLATELET # BLD AUTO: 298 K/UL (ref 150–450)
PMV BLD AUTO: 9.4 FL (ref 9.2–12.9)
POTASSIUM SERPL-SCNC: 4.8 MMOL/L (ref 3.5–5.1)
PROT SERPL-MCNC: 6.9 G/DL (ref 6–8.4)
RBC # BLD AUTO: 4.42 M/UL (ref 4.6–6.2)
SODIUM SERPL-SCNC: 139 MMOL/L (ref 136–145)
WBC # BLD AUTO: 8.73 K/UL (ref 3.9–12.7)

## 2024-12-06 PROCEDURE — 83735 ASSAY OF MAGNESIUM: CPT | Mod: PN | Performed by: NURSE PRACTITIONER

## 2024-12-06 PROCEDURE — 99999 PR PBB SHADOW E&M-EST. PATIENT-LVL III: CPT | Mod: PBBFAC,,, | Performed by: NURSE PRACTITIONER

## 2024-12-06 PROCEDURE — 85025 COMPLETE CBC W/AUTO DIFF WBC: CPT | Mod: PN | Performed by: NURSE PRACTITIONER

## 2024-12-06 PROCEDURE — 36415 COLL VENOUS BLD VENIPUNCTURE: CPT | Mod: PN | Performed by: NURSE PRACTITIONER

## 2024-12-06 PROCEDURE — 99213 OFFICE O/P EST LOW 20 MIN: CPT | Mod: PBBFAC,PN | Performed by: NURSE PRACTITIONER

## 2024-12-06 PROCEDURE — 80053 COMPREHEN METABOLIC PANEL: CPT | Mod: PN | Performed by: NURSE PRACTITIONER

## 2024-12-06 RX ORDER — AMMONIUM LACTATE 12 G/100G
1 CREAM TOPICAL 2 TIMES DAILY
Qty: 385 G | Refills: 5 | Status: SHIPPED | OUTPATIENT
Start: 2024-12-06

## 2024-12-06 RX ORDER — ATROPINE SULFATE 0.4 MG/ML
0.4 INJECTION, SOLUTION ENDOTRACHEAL; INTRAMEDULLARY; INTRAMUSCULAR; INTRAVENOUS; SUBCUTANEOUS ONCE AS NEEDED
OUTPATIENT
Start: 2024-12-09

## 2024-12-06 RX ORDER — PROCHLORPERAZINE EDISYLATE 5 MG/ML
5 INJECTION INTRAMUSCULAR; INTRAVENOUS ONCE AS NEEDED
OUTPATIENT
Start: 2024-12-11

## 2024-12-06 RX ORDER — PROCHLORPERAZINE EDISYLATE 5 MG/ML
5 INJECTION INTRAMUSCULAR; INTRAVENOUS ONCE AS NEEDED
OUTPATIENT
Start: 2024-12-09

## 2024-12-06 RX ORDER — DIPHENHYDRAMINE HYDROCHLORIDE 50 MG/ML
50 INJECTION INTRAMUSCULAR; INTRAVENOUS ONCE AS NEEDED
OUTPATIENT
Start: 2024-12-09

## 2024-12-06 RX ORDER — HEPARIN 100 UNIT/ML
500 SYRINGE INTRAVENOUS
OUTPATIENT
Start: 2024-12-11

## 2024-12-06 RX ORDER — FLUOROURACIL 50 MG/ML
360 INJECTION, SOLUTION INTRAVENOUS
OUTPATIENT
Start: 2024-12-09

## 2024-12-06 RX ORDER — SODIUM CHLORIDE 0.9 % (FLUSH) 0.9 %
10 SYRINGE (ML) INJECTION
OUTPATIENT
Start: 2024-12-09

## 2024-12-06 RX ORDER — HEPARIN 100 UNIT/ML
500 SYRINGE INTRAVENOUS
OUTPATIENT
Start: 2024-12-09

## 2024-12-06 RX ORDER — EPINEPHRINE 0.3 MG/.3ML
0.3 INJECTION SUBCUTANEOUS ONCE AS NEEDED
OUTPATIENT
Start: 2024-12-09

## 2024-12-06 RX ORDER — SODIUM CHLORIDE 0.9 % (FLUSH) 0.9 %
10 SYRINGE (ML) INJECTION
OUTPATIENT
Start: 2024-12-11

## 2024-12-06 NOTE — PROGRESS NOTES
PATIENT: Fred Benjamin  MRN: [id number]  DATE: 12/6/2024      Diagnosis:   1. Malignant neoplasm of pancreas, unspecified location of malignancy    2. Ichthyosis        Chief Complaint: Clearance for C2 FOLFIRINOX      Oncologic History:   Oncology History   Malignant neoplasm of pancreas   10/11/2024 Initial Diagnosis    Pancreatic adenocarcinoma     10/11/2024 Cancer Staged    Staging form: Exocrine Pancreas, AJCC 8th Edition  - Clinical: Stage IA (cT1c, cN0, cM0)     10/23/2024 -  Chemotherapy    Treatment Summary   Plan Name: OP GI FOLFIRINOX (oxaliplatin, leucovorin, irinotecan, fluorouracil) Q2W   Treatment Goal: Curative  Status: Active  Start Date: 10/23/2024  End Date: 10/30/2025 (Planned)  Provider: Stephan Suero MD  Chemotherapy: fluorouraciL injection 865 mg, 400 mg/m2 = 865 mg, Intravenous, Clinic/HOD 1 time, 2 of 26 cycles  Dose modification: 360 mg/m2 (original dose 400 mg/m2, Cycle 2, Reason: Other (see comments), Comment: Diarrhea)  Administration: 865 mg (10/23/2024)  irinotecan (CAMPTOSAR) 380 mg in 0.9% NaCl 584 mL chemo infusion, 388 mg, Intravenous, Clinic/HOD 1 time, 2 of 26 cycles  Dose modification: 165 mg/m2 (original dose 180 mg/m2, Cycle 2, Reason: Other (see comments), Comment: Diarrhea)  Administration: 380 mg (10/23/2024)  oxaliplatin (ELOXATIN) 85 mg/m2 = 184 mg in D5W 601.8 mL chemo infusion, 85 mg/m2 = 184 mg, Intravenous, Clinic/HOD 1 time, 2 of 26 cycles  Administration: 184 mg (10/23/2024)  fluorouracil (Adrucil) 5,000 mg in 100 mL chemo infusion, 5,185 mg, Intravenous, Over 46 hours, 2 of 26 cycles  Dose modification: 2,000 mg/m2 (original dose 2,400 mg/m2, Cycle 2, Reason: Other (see comments), Comment: Diarrhea)  Administration: 5,000 mg (10/23/2024)             Subjective:    Interval History:  Mr. Benjamin is a 69 y.o. male with Arthritis, A-fib, GERD, HTN, BHUMIKA who presents for pancreatic cancer.  Since the last clinic visit the patient was admitted to [facility]  (11/26 -  11/28/24) for tx associated flash pulmonary edema, N/V, hypokalemia and acute respiratory failure with hypoxia. Since d/c, patient is feeling much better, noting his diarrhea is completely resolved.  He denies fevers, CP, fatigue, cough, SOB, diarrhea,constipation.  The patient denies night sweats, new lumps or bumps, easy bruising or bleeding.    Patient endorses neck pain that has been present since a surgery in 1982 that is causing him some issues at this time.       Prior History:   Pt initially underwent US abdomen 8/02/24 for work up of abdominal pain.  US showed mild intrahepatic ductal dilatation.  PT underwent EUS on 9/13/24 showing for stones in the common bile duct; mass in the pancreatic tail which was biopsied; cystic lesion in the pancreatic body.  Biopsy returned positive for adenocarcinoma.  The patient was discussed at tumor board on 09/18/2024 with recommendation for referral to Surgical Oncology and Medical Oncology.  The patient underwent CT chest, abdomen, and pelvis on 09/1924 showing a small pleural plaque of platelike atelectasis anteriorly in the right hemithorax; intra and extrahepatic biliary duct dilatation; faint density within the common duct which could represent faintly calcified stones, sludge, or debris; large amount of pancreatic fat stranding and fluid around the tail of the pancreas.  Patient underwent ERCP on 09/20/2024 showing dilatation of the entire main bile duct with stone causing obstruction.  Patient underwent ampullary dilatation and biliary sphincterotomy with the biliary tree swept.  Patient met with surgical oncologist Dr. Butler on 09/25/2024 whom recommended 4 cycles chemotherapy followed by restaging and consideration for distal pancreatectomy.  The patient went to [facility] on 10/03/2024 and underwent CT chest, abdomen, and pelvis showing Variable sized pulmonary nodules predominantly within the left lung with a dominant lesion having irregular border measuring  12 mm in the next most prominent nodule measuring 7 mm; mild emphysema; bilobed fluid or 6 appearing lesion in the pancreatic tail; and chronic dilatation of the common bile duct.  The patient met with medical oncologist Dr. Olson on 10/03/2024 whom recommended 12 cycles of FOLFIRINOX and perioperative management.  Patient underwent cycle 1 of FOLFIRINOX on 10/23/2024 which was then followed by 2 hospitalizations: 11/5/24-11/9/24 for diarrhea subsequently diagnosed with c.diff and sent home on antibiotics and then again on 11/26/24-11/28/24 for flash pulmonary edema and respiratory failure. Patient has then recovered with resolution of diarrhea and SOB.    Past Medical History:   Past Medical History:   Diagnosis Date    Arthritis     Atrial fibrillation     Cancer 03/2017    Bladder    GERD (gastroesophageal reflux disease)     Headache(784.0)     Hypertension     Pacemaker     Pancreatic cancer     Rash     Sleep apnea     no cpap       Past Surgical HIstory:   Past Surgical History:   Procedure Laterality Date    ABCESS DRAINAGE  06/01/2018    I&D with irrigation abcess left lower back    ABLATION      ATRIAL ABLATION SURGERY      x 2    BLADDER TUMOR EXCISION  03/14/2017     cysto/Cancer    CARDIAC PACEMAKER PLACEMENT      CARDIOVERSION      carpel tunnel Bilateral     CERVICAL FUSION      x3    CHOLECYSTECTOMY      COLONOSCOPY      ENDOSCOPIC ULTRASOUND OF UPPER GASTROINTESTINAL TRACT Left 09/13/2024    Procedure: ULTRASOUND, UPPER GI TRACT, ENDOSCOPIC;  Surgeon: Gerald Ochoa MD;  Location: [facility];  Service: Endoscopy;  Laterality: Left;    ERCP N/A 09/20/2024    Procedure: ERCP (ENDOSCOPIC RETROGRADE CHOLANGIOPANCREATOGRAPHY);  Surgeon: Mariano Ferreira III, MD;  Location: [facility] ENDO;  Service: Endoscopy;  Laterality: N/A;    ESOPHAGUS SURGERY      ting and then reversal    FOOT FRACTURE SURGERY      FRACTURE SURGERY      INCISION AND DRAINAGE OF ABSCESS Left 06/01/2018    Procedure: INCISION  AND DRAINAGE, ABSCESS - left, lower back;  Surgeon: Irving Edouard MD;  [facility];  Service: Orthopedics;  Laterality: Left;    INSERTION OF TUNNELED CENTRAL VENOUS CATHETER (CVC) WITH SUBCUTANEOUS PORT N/A 10/18/2024    Procedure: [id number]-CATH;  Surgeon: Irving Alicia MD;  Location: [facility] OR;  Service: General;  Laterality: N/A;    KNEE ARTHROSCOPY W/ MENISCAL REPAIR Left     ROTATOR CUFF REPAIR Left        Family History:   Family History   Problem Relation Name Age of Onset    Coronary artery disease Mother      Diabetes Mother      Cancer Father          Lymphoma       Social History:  reports that he has quit smoking. His smoking use included cigars and cigarettes. He has a 20 pack-year smoking history. He has been exposed to tobacco smoke. He has never used smokeless tobacco. He reports that he does not drink alcohol and does not use drugs.    Allergies:  Review of patient's allergies indicates:   Allergen Reactions    Cleocin [clindamycin hcl] Shortness Of Breath     Constipation    Amiodarone     Amiodarone analogues      Affected thyroid    Dabigatran etexilate     Rythmol [propafenone]      Weight loss/ nausea       Medications:  Current Outpatient Medications   Medication Sig Dispense Refill    ELIQUIS 5 mg Tab Take 5 mg by mouth 2 (two) times daily.       morphine (MS CONTIN) 60 MG 12 hr tablet Take 60 mg by mouth 2 (two) times daily.      ondansetron (ZOFRAN-ODT) 4 MG TbDL Take 1 tablet (4 mg total) by mouth every 6 (six) hours as needed (for nausea).      oxyCODONE (ROXICODONE) 30 MG Tab Take 30 mg by mouth 4 (four) times daily as needed. *never started/purchased - rx post dated for 11/27/24 by Physician      potassium chloride SA (K-DUR,KLOR-CON) 20 MEQ tablet Take 1 tablet (20 mEq total) by mouth once daily.      sotaloL (BETAPACE AF) 160 MG Tab Take 160 mg by mouth 2 (two) times daily.       No current facility-administered medications for this visit.       Review of Systems  "  Constitutional:  Negative for chills, diaphoresis, fatigue, fever and unexpected weight change.   Respiratory:  Negative for cough and shortness of breath.    Cardiovascular:  Negative for chest pain and palpitations.   Gastrointestinal:  Negative for abdominal pain, constipation, diarrhea, nausea and vomiting.   Musculoskeletal:         Left hip pain   Skin:  Negative for color change and rash.   Neurological:  Positive for dizziness (intermittent) and headaches (attributes to his neck/cervical pain). Negative for weakness.   Hematological:  Negative for adenopathy. Does not bruise/bleed easily.       ECOG Performance Status: 1   Objective:    Weight:  Wt Readings from Last 3 Encounters:   12/06/24 87.6 kg (193 lb 2 oz)   11/27/24 83.9 kg (185 lb)   11/21/24 88 kg (194 lb 0.1 oz)     Vitals:   Vitals:    12/06/24 1127   BP: 104/81   BP Location: Left arm   Patient Position: Sitting   Pulse: 88   Resp: 17   Temp: 98.2 °F (36.8 °C)   TempSrc: Temporal   SpO2: 95%   Weight: 87.6 kg (193 lb 2 oz)   Height: 5' 11" (1.803 m)         Physical Exam  Vitals reviewed.   Constitutional:       General: He is not in acute distress.     Appearance: He is well-developed. He is not ill-appearing or diaphoretic.   HENT:      Head: Normocephalic and atraumatic.   Eyes:      Conjunctiva/sclera: Conjunctivae normal.   Cardiovascular:      Rate and Rhythm: Normal rate and regular rhythm.      Heart sounds: Normal heart sounds. No murmur heard.     No friction rub. No gallop.   Pulmonary:      Effort: Pulmonary effort is normal. No respiratory distress.      Breath sounds: Normal breath sounds. No wheezing or rales.   Chest:      Chest wall: No tenderness.   Abdominal:      General: Bowel sounds are normal. There is no distension.      Palpations: Abdomen is soft. There is no mass.      Tenderness: There is abdominal tenderness (RUQ). There is no rebound.   Musculoskeletal:      Cervical back: Neck supple.      Right lower leg: No " edema.      Left lower leg: No edema.   Lymphadenopathy:      Cervical: No cervical adenopathy.      Upper Body:      Right upper body: No supraclavicular or axillary adenopathy.      Left upper body: No supraclavicular or axillary adenopathy.   Skin:     General: Skin is warm and dry.      Coloration: Skin is not pale.      Findings: No erythema or rash.   Neurological:      Mental Status: He is alert and oriented to person, place, and time.   Psychiatric:         Behavior: Behavior normal.         Thought Content: Thought content normal.         Laboratory Data:  Lab Visit on 12/06/2024   Component Date Value Ref Range Status    WBC 12/06/2024 8.73  3.90 - 12.70 K/uL Final    RBC 12/06/2024 4.42 (L)  4.60 - 6.20 M/uL Final    Hemoglobin 12/06/2024 11.5 (L)  14.0 - 18.0 g/dL Final    Hematocrit 12/06/2024 37.1 (L)  40.0 - 54.0 % Final    MCV 12/06/2024 84  82 - 98 fL Final    MCH 12/06/2024 26.0 (L)  27.0 - 31.0 pg Final    MCHC 12/06/2024 31.0 (L)  32.0 - 36.0 g/dL Final    RDW 12/06/2024 16.2 (H)  11.5 - 14.5 % Final    Platelets 12/06/2024 298  150 - 450 K/uL Final    MPV 12/06/2024 9.4  9.2 - 12.9 fL Final    Immature Granulocytes 12/06/2024 0.8 (H)  0.0 - 0.5 % Final    Gran # (ANC) 12/06/2024 5.8  1.8 - 7.7 K/uL Final    Immature Grans (Abs) 12/06/2024 0.07 (H)  0.00 - 0.04 K/uL Final    Comment: Mild elevation in immature granulocytes is non specific and   can be seen in a variety of conditions including stress response,   acute inflammation, trauma and pregnancy. Correlation with other   laboratory and clinical findings is essential.      Lymph # 12/06/2024 1.7  1.0 - 4.8 K/uL Final    Mono # 12/06/2024 0.8  0.3 - 1.0 K/uL Final    Eos # 12/06/2024 0.3  0.0 - 0.5 K/uL Final    Baso # 12/06/2024 0.07  0.00 - 0.20 K/uL Final    nRBC 12/06/2024 0  0 /100 WBC Final    Gran % 12/06/2024 66.2  38.0 - 73.0 % Final    Lymph % 12/06/2024 19.5  18.0 - 48.0 % Final    Mono % 12/06/2024 9.0  4.0 - 15.0 % Final     Eosinophil % 12/06/2024 3.7  0.0 - 8.0 % Final    Basophil % 12/06/2024 0.8  0.0 - 1.9 % Final    Differential Method 12/06/2024 Automated   Final    Sodium 12/06/2024 139  136 - 145 mmol/L Final    Potassium 12/06/2024 4.8  3.5 - 5.1 mmol/L Final    Chloride 12/06/2024 102  95 - 110 mmol/L Final    CO2 12/06/2024 27  23 - 29 mmol/L Final    Glucose 12/06/2024 114 (H)  70 - 110 mg/dL Final    BUN 12/06/2024 9  8 - 23 mg/dL Final    Creatinine 12/06/2024 1.3  0.5 - 1.4 mg/dL Final    Calcium 12/06/2024 8.8  8.7 - 10.5 mg/dL Final    Total Protein 12/06/2024 6.9  6.0 - 8.4 g/dL Final    Albumin 12/06/2024 2.7 (L)  3.5 - 5.2 g/dL Final    Total Bilirubin 12/06/2024 0.3  0.1 - 1.0 mg/dL Final    Comment: For infants and newborns, interpretation of results should be based  on gestational age, weight and in agreement with clinical  observations.    Premature Infant recommended reference ranges:  Up to 24 hours.............<8.0 mg/dL  Up to 48 hours............<12.0 mg/dL  3-5 days..................<15.0 mg/dL  6-29 days.................<15.0 mg/dL      Alkaline Phosphatase 12/06/2024 96  40 - 150 U/L Final    AST 12/06/2024 20  10 - 40 U/L Final    ALT 12/06/2024 21  10 - 44 U/L Final    eGFR 12/06/2024 58.7 (A)  >60 mL/min/1.73 m^2 Final    Anion Gap 12/06/2024 10  8 - 16 mmol/L Final    Magnesium 12/06/2024 2.1  1.6 - 2.6 mg/dL Final         Imaging:     PET/CT 10/17/24    Quality of the study: Adequate.     Head and neck: Small mildly hypermetabolic focus in the right parotid gland with maximum SUV of 3.3, favored to represent a reactive lymph node.  No pathologic lymphadenopathy.     Chest: There are no hypermetabolic lesions worrisome for malignancy.  There are no concerning pulmonary nodules or masses, and there are no pathologically enlarged or hypermetabolic lymph nodes.     Abdomen and pelvis: No abnormal hypermetabolism in the pancreatic tail.  Significant improvement of prior fat stranding and edema surrounding  pancreatic tail.  There is new pneumobilia.     Hypermetabolic subcutaneous soft tissue thickening in the midline inferior perineum and medial left thigh (image [phone] and [phone]).  Increased radiotracer uptake in a normal appearing left inguinal lymph node, likely reactive.     There is physiologic tracer distribution within the abdominal organs and excretion into the genitourinary system.     Bones: There are no hypermetabolic lesions worrisome for malignancy.       Assessment:       1. Malignant neoplasm of pancreas, unspecified location of malignancy    2. Ichthyosis    3. Immunosuppression due to drug therapy    4. [place] (acute kidney injury)           Plan:   Adenocarcinoma of the Pancreas - patient with adenocarcinoma in the tail of the pancreas status post biopsy 09/13/2024  -CT chest, abdomen, and pelvis on 10/03/2024 showed a lesion in the tail of the pancreas as well as multiple pulmonary nodules with 1 nodule measuring at least 12 mm  -Will obtain PET-CT  -Pt seen by Dr Butler in surgical oncology at [facility] 09/25/2024 whom recommended 4 cycles of chemo followed by restaging  -Patient seen by  at [facility] on 10/03/2024 recommending perioperative FOLFIRINOX with elimination of bolus fluorouracil and leucovorin  -Pharmacogenomic showed the patient to have normal UGT1A1 and DPYD genes  -[employer] germline testing negative  -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   Nodule in left hip felt to be trochanteric bursitis  -Will proceed with cycle 2 on 12/9/2024 with dose reductions in both irinotecan (165mg/m2) and 5-FU (2000mg/m2).    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 - pt at increased risk of infection  -Pt with leukocytosis but no source of infection identified  -Will monitor    Decaresed eGFR - GFR at 58.7 on 12/6/2024  -likely r/t to recent c.diff and  hospitalization  -Will administer 1L of NS with treatment on 12/9/2024    Pulmonary Nodules - Seen on Ct chest 10/03/24 with largest nodule in left lung  -PET/CT 10/17/24 showed no concerning nodules  -Will monitor    A-fib - pt on eliquis  -Cardiology managing  -Will monitor    HTN - pt on amlodipine, sotalol  -/81 in clinic today  -Will monitor    GERD - pt on protonix  -Will monitor    B12 Deficiency - low on 8/02/24 at 195pg/mL  -Pt on B12 injection monthly  -Will monitor    Trochanteric Bursitis - in the left hip  -Causing left hip pain and seen on PET/CT  -Will monitor    Hx of C-diff  Hospitalized 11/05 - 11/08: Tx with 10 days Vanc & Tamiflu for Flu A post hospitalization   11/19/24:  diarrhea persistent; collect stool for c-diff; discussed with Dr. Suero - Fidaxomicin next line of tx (200 mg p.o. bid x 10 day)  -12/6/2024: diarrhea completely resolved       Route Chart for Scheduling    Med Onc Chart Routing      Follow up with physician 2 weeks. Pateint will need CBC, CMP, and mag with appt with Dr. Suero on 12/20/2024 with treatment on 12/24/24, pump d/c 12/26/2024.   Follow up with KB    Infusion scheduling note    Injection scheduling note    Labs    Imaging    Pharmacy appointment    Other referrals                Treatment Plan Information   OP GI FOLFIRINOX (oxaliplatin, leucovorin, irinotecan, fluorouracil) Q2W  Stephan Suero MD   Associated diagnosis: Malignant neoplasm of pancreas Stage IA cT1c, cN0, cM0 noted on 10/11/2024   Line of treatment: Neoadjuvant  Treatment Goal: Curative     Upcoming Treatment Dates - OP GI FOLFIRINOX (oxaliplatin, leucovorin, irinotecan, fluorouracil) Q2W     11/26/2024       Chemotherapy       oxaliplatin (ELOXATIN) 85 mg/m2 = 178 mg in D5W 535.6 mL chemo infusion       leucovorin calcium 360 mg/m2 = 750 mg in D5W 250 mL infusion       irinotecan (CAMPTOSAR) 165 mg/m2 = 344 mg in 0.9% NaCl 517.2 mL chemo infusion       fluorouraciL injection 750 mg        fluorouracil (Adrucil) 2,000 mg/m2 = 4,180 mg in 0.9% NaCl 100 mL chemo infusion       Antiemetics       palonosetron 0.25mg/dexAMETHasone 12mg in NS IVPB 0.25 mg 50 mL  12/10/2024       Chemotherapy       oxaliplatin (ELOXATIN) in D5W 535.6 mL chemo infusion       leucovorin calcium in D5W 250 mL infusion       irinotecan (CAMPTOSAR) in 0.9% NaCl 517.2 mL chemo infusion       fluorouraciL injection       fluorouracil chemo infusion       Antiemetics       palonosetron 0.25mg/dexAMETHasone 12mg in NS IVPB 0.25 mg 50 mL  12/24/2024       Chemotherapy       oxaliplatin (ELOXATIN) in D5W 535.6 mL chemo infusion       leucovorin calcium in D5W 250 mL infusion       irinotecan (CAMPTOSAR) in 0.9% NaCl 517.2 mL chemo infusion       fluorouraciL injection       fluorouracil chemo infusion       Antiemetics       palonosetron 0.25mg/dexAMETHasone 12mg in NS IVPB 0.25 mg 50 mL  1/7/2025       Chemotherapy       oxaliplatin (ELOXATIN) in D5W 535.6 mL chemo infusion       leucovorin calcium in D5W 250 mL infusion       irinotecan (CAMPTOSAR) in 0.9% NaCl 517.2 mL chemo infusion       fluorouraciL injection       fluorouracil chemo infusion       Antiemetics       palonosetron 0.25mg/dexAMETHasone 12mg in NS IVPB 0.25 mg 50 mL    Supportive Plan Information  IV FLUIDS AND ELECTROLYTES Truman Gr NP   Associated Diagnosis: Dehydration   noted on 11/5/2024  Associated Diagnosis: Chemotherapy induced diarrhea   noted on 11/1/2024  Associated Diagnosis: Diarrhea   noted on 11/19/2024   Line of treatment: Supportive Care   Treatment goal: Supportive     Upcoming Treatment Dates - IV FLUIDS AND ELECTROLYTES    No upcoming days in selected categories.    Visit today included increased complexity associated with the care of the episodic problem (chemotherapy) addressed and managing the longitudinal care of the patient due to the serious and/or complex managed problem(s) pancreatic cancer.    SHANNON Moreland  Nurse  Practitioner, Hem-Onc  [facility]

## 2024-12-09 ENCOUNTER — DOCUMENTATION ONLY (OUTPATIENT)
Dept: INFUSION THERAPY | Facility: HOSPITAL | Age: 69
End: 2024-12-09
Payer: MEDICARE

## 2024-12-09 ENCOUNTER — INFUSION (OUTPATIENT)
Dept: INFUSION THERAPY | Facility: HOSPITAL | Age: 69
End: 2024-12-09
Attending: INTERNAL MEDICINE
Payer: MEDICARE

## 2024-12-09 VITALS
HEART RATE: 62 BPM | HEIGHT: 71 IN | BODY MASS INDEX: 27.04 KG/M2 | WEIGHT: 193.13 LBS | DIASTOLIC BLOOD PRESSURE: 94 MMHG | SYSTOLIC BLOOD PRESSURE: 145 MMHG | OXYGEN SATURATION: 98 % | TEMPERATURE: 98 F | RESPIRATION RATE: 16 BRPM

## 2024-12-09 DIAGNOSIS — C25.9 MALIGNANT NEOPLASM OF PANCREAS, UNSPECIFIED LOCATION OF MALIGNANCY: Primary | ICD-10-CM

## 2024-12-09 PROCEDURE — 96413 CHEMO IV INFUSION 1 HR: CPT | Mod: PN

## 2024-12-09 PROCEDURE — 96415 CHEMO IV INFUSION ADDL HR: CPT | Mod: PN

## 2024-12-09 PROCEDURE — 63600175 PHARM REV CODE 636 W HCPCS: Mod: PN | Performed by: NURSE PRACTITIONER

## 2024-12-09 PROCEDURE — 96368 THER/DIAG CONCURRENT INF: CPT | Mod: PN

## 2024-12-09 PROCEDURE — 96416 CHEMO PROLONG INFUSE W/PUMP: CPT | Mod: PN

## 2024-12-09 PROCEDURE — 96375 TX/PRO/DX INJ NEW DRUG ADDON: CPT | Mod: PN

## 2024-12-09 PROCEDURE — 96417 CHEMO IV INFUS EACH ADDL SEQ: CPT | Mod: PN

## 2024-12-09 PROCEDURE — 96367 TX/PROPH/DG ADDL SEQ IV INF: CPT | Mod: PN

## 2024-12-09 PROCEDURE — 96411 CHEMO IV PUSH ADDL DRUG: CPT | Mod: PN

## 2024-12-09 PROCEDURE — 25000003 PHARM REV CODE 250: Mod: PN | Performed by: NURSE PRACTITIONER

## 2024-12-09 RX ORDER — DIPHENHYDRAMINE HYDROCHLORIDE 50 MG/ML
50 INJECTION INTRAMUSCULAR; INTRAVENOUS ONCE AS NEEDED
Status: DISCONTINUED | OUTPATIENT
Start: 2024-12-09 | End: 2024-12-09 | Stop reason: HOSPADM

## 2024-12-09 RX ORDER — SODIUM CHLORIDE 0.9 % (FLUSH) 0.9 %
10 SYRINGE (ML) INJECTION
Status: DISCONTINUED | OUTPATIENT
Start: 2024-12-09 | End: 2024-12-09 | Stop reason: HOSPADM

## 2024-12-09 RX ORDER — EPINEPHRINE 0.3 MG/.3ML
0.3 INJECTION SUBCUTANEOUS ONCE AS NEEDED
Status: DISCONTINUED | OUTPATIENT
Start: 2024-12-09 | End: 2024-12-09 | Stop reason: HOSPADM

## 2024-12-09 RX ORDER — PROCHLORPERAZINE EDISYLATE 5 MG/ML
5 INJECTION INTRAMUSCULAR; INTRAVENOUS ONCE AS NEEDED
Status: DISCONTINUED | OUTPATIENT
Start: 2024-12-09 | End: 2024-12-09 | Stop reason: HOSPADM

## 2024-12-09 RX ORDER — SODIUM CHLORIDE 9 MG/ML
1000 INJECTION, SOLUTION INTRAVENOUS ONCE
Status: COMPLETED | OUTPATIENT
Start: 2024-12-09 | End: 2024-12-09

## 2024-12-09 RX ORDER — ATROPINE SULFATE 0.4 MG/ML
0.4 INJECTION, SOLUTION ENDOTRACHEAL; INTRAMEDULLARY; INTRAMUSCULAR; INTRAVENOUS; SUBCUTANEOUS ONCE AS NEEDED
Status: COMPLETED | OUTPATIENT
Start: 2024-12-09 | End: 2024-12-09

## 2024-12-09 RX ORDER — HEPARIN 100 UNIT/ML
500 SYRINGE INTRAVENOUS
Status: DISCONTINUED | OUTPATIENT
Start: 2024-12-09 | End: 2024-12-09 | Stop reason: HOSPADM

## 2024-12-09 RX ORDER — FLUOROURACIL 50 MG/ML
360 INJECTION, SOLUTION INTRAVENOUS
Status: COMPLETED | OUTPATIENT
Start: 2024-12-09 | End: 2024-12-09

## 2024-12-09 RX ADMIN — LEUCOVORIN CALCIUM 750 MG: 350 INJECTION, POWDER, LYOPHILIZED, FOR SUSPENSION INTRAMUSCULAR; INTRAVENOUS at 02:12

## 2024-12-09 RX ADMIN — DEXTROSE MONOHYDRATE: 5 INJECTION, SOLUTION INTRAVENOUS at 12:12

## 2024-12-09 RX ADMIN — FLUOROURACIL 4000 MG: 50 INJECTION, SOLUTION INTRAVENOUS at 04:12

## 2024-12-09 RX ADMIN — DEXAMETHASONE SODIUM PHOSPHATE 0.25 MG: 10 INJECTION, SOLUTION INTRAMUSCULAR; INTRAVENOUS at 12:12

## 2024-12-09 RX ADMIN — ATROPINE SULFATE 0.4 MG: 0.4 INJECTION, SOLUTION INTRAVENOUS at 02:12

## 2024-12-09 RX ADMIN — SODIUM CHLORIDE 340 MG: 9 INJECTION, SOLUTION INTRAVENOUS at 02:12

## 2024-12-09 RX ADMIN — SODIUM CHLORIDE 1000 ML: 9 INJECTION, SOLUTION INTRAVENOUS at 11:12

## 2024-12-09 RX ADMIN — SODIUM CHLORIDE: 9 INJECTION, SOLUTION INTRAVENOUS at 02:12

## 2024-12-09 RX ADMIN — OXALIPLATIN 178 MG: 5 INJECTION, SOLUTION INTRAVENOUS at 12:12

## 2024-12-09 RX ADMIN — FLUOROURACIL 750 MG: 50 INJECTION, SOLUTION INTRAVENOUS at 04:12

## 2024-12-09 NOTE — PLAN OF CARE
Problem: Adult Inpatient Plan of Care  Goal: Plan of Care Review  Outcome: Progressing  Flowsheets (Taken 12/9/2024 1111)  Plan of Care Reviewed With:   patient   spouse  Goal: Patient-Specific Goal (Individualized)  Outcome: Progressing  Flowsheets (Taken 12/9/2024 1111)  Individualized Care Needs: Recliner, warm blanket, accompanied by his wife, education, conversation, snacks, dimmed lights, tv  Anxieties, Fears or Concerns: None  Patient/Family-Specific Goals (Include Timeframe): Free of S/S of reaction with treatment.     Problem: Fatigue  Goal: Improved Activity Tolerance  Description: Rest periods  Outcome: Progressing  Intervention: Promote Improved Energy  Flowsheets (Taken 12/9/2024 1111)  Fatigue Management:   frequent rest breaks encouraged   paced activity encouraged  Sleep/Rest Enhancement: regular sleep/rest pattern promoted  Activity Management: Ambulated -L4  Environmental Support: rest periods encouraged    Patient to Infusion for Folfirinox. Accompanied by his wife. Treatment plan reviewed with patient. VSS. Tolerated infusion. Home infusion pump initiated. Connections secured. Instructions and troubleshooting reviewed with patient. Provided with copy of upcoming appointment schedule. Escorted to the front lobby for discharge to home.

## 2024-12-09 NOTE — PROGRESS NOTES
"Oncology Nutrition   Chemotherapy Infusion Visit    Nutrition Follow Up   RD stopped by chairside for f/u visit after chemoschool on 10/31/24. Infusion is delayed (D1C2) Folfirinox. He denies any questions regarding chemo/diet education he received. Denies any GI intolerances or diet issues. Pt had C.Diff infection a  month ago but reports GI system is back to normal and has soft BM's. Encouraged balanced meals with fruits/veg and lots water. He said he is more of "meat and potatoes" person. He does like peas, green beans, carrots.   Weight reviewed.     Wt Readings from Last 10 Encounters:   12/09/24 87.6 kg (193 lb 2 oz)   12/06/24 87.6 kg (193 lb 2 oz)   11/27/24 83.9 kg (185 lb)   11/21/24 88 kg (194 lb 0.1 oz)   11/19/24 85.7 kg (188 lb 15 oz)   11/19/24 85.7 kg (188 lb 15 oz)   11/06/24 86.1 kg (189 lb 13.1 oz)   11/05/24 84.5 kg (186 lb 4.6 oz)   11/01/24 92.5 kg (204 lb)   10/23/24 92.8 kg (204 lb 9.4 oz)     All other nutrition questions/concerns addressed as appropriate. Will continue to monitor prn throughout treatment.     Kim Dhaliwal, RADHIKA, LDN, [facility]  12/09/2024  11:43 AM         "

## 2024-12-10 ENCOUNTER — TELEPHONE (OUTPATIENT)
Dept: HEMATOLOGY/ONCOLOGY | Facility: CLINIC | Age: 69
End: 2024-12-10
Payer: MEDICARE

## 2024-12-10 NOTE — TELEPHONE ENCOUNTER
Advised patient this is OTC Imodium and can get at any pharmacy. He states he is not having any diarrhea at this time but wants to have it on hand just in case.

## 2024-12-10 NOTE — TELEPHONE ENCOUNTER
----- Message from Major sent at 12/10/2024  8:40 AM CST -----  Regarding: Refill request  Type:  RX Refill Request    Who Called: PT  Refill or New Rx:refill    RX Name and Strength: Loperamide 2Mg capsules  How is the patient currently taking it? (ex. 1XDay):  Is this a 30 day or 90 day RX:30    Preferred Pharmacy with phone number:  [id number] - [place] - [address]  [address] [place] [address]  Phone: [phone] Fax: [phone]      Local or Mail Order:local  Ordering Provider:    Would the patient rather a call back or a response via [facility]? Call back    Best Call Back Number:[phone]      Additional Information: Sts he got a script from the hosp but it was put on hold and he hasn't been able to fill it and doesn't know why state he needs to see about getting it filled.     Please advise -- Thank you

## 2024-12-11 ENCOUNTER — INFUSION (OUTPATIENT)
Dept: INFUSION THERAPY | Facility: HOSPITAL | Age: 69
End: 2024-12-11
Attending: INTERNAL MEDICINE
Payer: MEDICARE

## 2024-12-11 VITALS
DIASTOLIC BLOOD PRESSURE: 94 MMHG | OXYGEN SATURATION: 99 % | SYSTOLIC BLOOD PRESSURE: 145 MMHG | TEMPERATURE: 98 F | HEART RATE: 62 BPM | RESPIRATION RATE: 18 BRPM

## 2024-12-11 DIAGNOSIS — Q80.9 ICHTHYOSIS: ICD-10-CM

## 2024-12-11 DIAGNOSIS — C25.9 PANCREATIC ADENOCARCINOMA: ICD-10-CM

## 2024-12-11 DIAGNOSIS — C25.9 MALIGNANT NEOPLASM OF PANCREAS, UNSPECIFIED LOCATION OF MALIGNANCY: Primary | ICD-10-CM

## 2024-12-11 DIAGNOSIS — R19.7 DIARRHEA, UNSPECIFIED TYPE: Primary | ICD-10-CM

## 2024-12-11 DIAGNOSIS — R11.2 NAUSEA AND VOMITING, UNSPECIFIED VOMITING TYPE: ICD-10-CM

## 2024-12-11 PROCEDURE — 96377 APPLICATON ON-BODY INJECTOR: CPT | Mod: PN

## 2024-12-11 PROCEDURE — A4216 STERILE WATER/SALINE, 10 ML: HCPCS | Mod: PN | Performed by: NURSE PRACTITIONER

## 2024-12-11 PROCEDURE — 63600175 PHARM REV CODE 636 W HCPCS: Mod: JZ,JG,PN | Performed by: NURSE PRACTITIONER

## 2024-12-11 PROCEDURE — 25000003 PHARM REV CODE 250: Mod: PN | Performed by: NURSE PRACTITIONER

## 2024-12-11 RX ORDER — HEPARIN 100 UNIT/ML
500 SYRINGE INTRAVENOUS
Status: DISCONTINUED | OUTPATIENT
Start: 2024-12-11 | End: 2024-12-11 | Stop reason: HOSPADM

## 2024-12-11 RX ORDER — PROCHLORPERAZINE EDISYLATE 5 MG/ML
5 INJECTION INTRAMUSCULAR; INTRAVENOUS ONCE AS NEEDED
Status: DISCONTINUED | OUTPATIENT
Start: 2024-12-11 | End: 2024-12-11 | Stop reason: HOSPADM

## 2024-12-11 RX ORDER — LOPERAMIDE HYDROCHLORIDE 2 MG/1
CAPSULE ORAL
Qty: 30 CAPSULE | Refills: 11 | Status: SHIPPED | OUTPATIENT
Start: 2024-12-11

## 2024-12-11 RX ORDER — SODIUM CHLORIDE 0.9 % (FLUSH) 0.9 %
10 SYRINGE (ML) INJECTION
Status: DISCONTINUED | OUTPATIENT
Start: 2024-12-11 | End: 2024-12-11 | Stop reason: HOSPADM

## 2024-12-11 RX ADMIN — PEGFILGRASTIM 6 MG: KIT SUBCUTANEOUS at 02:12

## 2024-12-11 RX ADMIN — SODIUM CHLORIDE, PRESERVATIVE FREE 10 ML: 5 INJECTION INTRAVENOUS at 02:12

## 2024-12-16 ENCOUNTER — TELEPHONE (OUTPATIENT)
Dept: HEMATOLOGY/ONCOLOGY | Facility: CLINIC | Age: 69
End: 2024-12-16
Payer: MEDICARE

## 2024-12-16 NOTE — TELEPHONE ENCOUNTER
Called to check on patient as he had not reported to ER yet for evaluation. Wife sates he was refusing to go and will wait till tomorrow to decide if he feels bad enough. Encouraged wife again to take him sooner vs later for proper management of the weakness and diarrhea.   She verbalized understanding and will speak with him about this.

## 2024-12-16 NOTE — TELEPHONE ENCOUNTER
Wife states diarrhea started 2 days ago and not responsive to Imodium. Foul smelling . He is not able to eat and only drinks sips of water. No fever. He is feeling extremely weak and gets dizzy on standing. Spoke to Macario NEGRON and she advised Er for evaluation for this as he had hx of C-diff and pulmonary edema . Wife verbalized understanding and will take him now.

## 2024-12-16 NOTE — TELEPHONE ENCOUNTER
----- Message from [place] sent at 12/16/2024 10:01 AM CST -----  Type: Needs Medical Advice  Who Called:  Fina (spouse)  Symptoms (please be specific):    How long has patient had these symptoms:    Pharmacy name and phone #:    Best Call Back Number:   Additional Information: Fina is requesting a call back from the nurse regarding her  not eating and only drinking water, but has diarrhea really bad.   English

## 2024-12-17 ENCOUNTER — TELEPHONE (OUTPATIENT)
Dept: HEMATOLOGY/ONCOLOGY | Facility: CLINIC | Age: 69
End: 2024-12-17
Payer: MEDICARE

## 2024-12-17 PROBLEM — R11.10 ABDOMINAL PAIN, VOMITING, AND DIARRHEA: Status: ACTIVE | Noted: 2024-12-17

## 2024-12-17 PROBLEM — Z95.0 CARDIAC PACEMAKER IN SITU: Status: ACTIVE | Noted: 2024-12-17

## 2024-12-17 PROBLEM — Z85.51 HISTORY OF BLADDER CANCER: Status: ACTIVE | Noted: 2024-12-17

## 2024-12-17 PROBLEM — R65.10 SIRS (SYSTEMIC INFLAMMATORY RESPONSE SYNDROME): Status: ACTIVE | Noted: 2024-12-17

## 2024-12-17 PROBLEM — I50.32 CHRONIC DIASTOLIC CHF (CONGESTIVE HEART FAILURE): Status: ACTIVE | Noted: 2024-12-17

## 2024-12-17 PROBLEM — E87.1 HYPONATREMIA: Status: ACTIVE | Noted: 2024-12-17

## 2024-12-17 PROBLEM — Z71.89 ACP (ADVANCE CARE PLANNING): Status: ACTIVE | Noted: 2020-11-09

## 2024-12-17 PROBLEM — I50.32 CHRONIC DIASTOLIC HEART FAILURE: Status: ACTIVE | Noted: 2024-12-17

## 2024-12-17 PROBLEM — R91.8 GROUND GLASS OPACITY PRESENT ON IMAGING OF LUNG: Status: ACTIVE | Noted: 2024-12-17

## 2024-12-17 PROBLEM — B34.2 CORONAVIRUS INFECTION: Status: ACTIVE | Noted: 2024-12-17

## 2024-12-17 PROBLEM — E87.20 METABOLIC ACIDOSIS, NORMAL ANION GAP (NAG): Status: ACTIVE | Noted: 2024-12-17

## 2024-12-17 PROBLEM — D61.818 PANCYTOPENIA: Status: ACTIVE | Noted: 2024-12-17

## 2024-12-17 PROBLEM — R73.03 PREDIABETES: Status: ACTIVE | Noted: 2024-12-17

## 2024-12-17 PROBLEM — I10 ESSENTIAL HYPERTENSION: Status: ACTIVE | Noted: 2024-12-17

## 2024-12-17 PROBLEM — R19.7 ABDOMINAL PAIN, VOMITING, AND DIARRHEA: Status: ACTIVE | Noted: 2024-12-17

## 2024-12-17 PROBLEM — R10.9 ABDOMINAL PAIN, VOMITING, AND DIARRHEA: Status: ACTIVE | Noted: 2024-12-17

## 2024-12-17 NOTE — TELEPHONE ENCOUNTER
Wife advised he did go to Er and was admitted. She is asking if he can get home health on discharge. Advised her she can speak to hospital team and  to assist in getting that set up for them. She thanked me for all the assistance.

## 2024-12-17 NOTE — TELEPHONE ENCOUNTER
----- Message from Carmen sent at 12/17/2024 10:51 AM CST -----  Type: Needs Medical Advice  Who Called:  Tia (spouse)  Symptoms (please be specific):    How long has patient had these symptoms:    Pharmacy name and phone #:    Best Call Back Number:   Additional Information: Tia requesting a call back from nurse

## 2024-12-19 PROBLEM — R54 AGE-RELATED PHYSICAL DEBILITY: Status: ACTIVE | Noted: 2024-12-19

## 2024-12-20 PROBLEM — J69.0 ASPIRATION PNEUMONIA OF LEFT LOWER LOBE: Status: ACTIVE | Noted: 2024-12-20

## 2024-12-23 ENCOUNTER — TELEPHONE (OUTPATIENT)
Dept: HEMATOLOGY/ONCOLOGY | Facility: CLINIC | Age: 69
End: 2024-12-23
Payer: MEDICARE

## 2024-12-23 NOTE — TELEPHONE ENCOUNTER
Spoke to Ms. Weber and Mr. Leo- he is discharged from the hospital. Still trying to rest and having diarrhea. Patient confirmed he has medication and just needs more rest. He requested if his treatment can be pushed back after the holidays. Secure chat to MIGDALIA Sorto since Dr. Suero is out. MIGDALIA Sorto aware and infusion aware and okay with change. Per infusion next available appointment is Jan 6. MIGDALIA Sorto aware of next available appointment. Patient will come in next week to see MIGDALIA Sorto and labs. Per infusion he is also on the waitlist. Will route to Dr. Suero's nurse. Confirmed new appointment time and day with patient. No other concerns or questions.         ----- Message from [email or web address] sent at 12/23/2024  8:57 AM CST -----  Type: Needs Medical Advice  Who Called:  Zoe ( nurse )    Best Call Back Number: [phone]   Additional Information: pt needs to schedule a hospital follow up appt

## 2024-12-24 ENCOUNTER — TELEPHONE (OUTPATIENT)
Dept: HEMATOLOGY/ONCOLOGY | Facility: CLINIC | Age: 69
End: 2024-12-24
Payer: MEDICARE

## 2024-12-24 NOTE — TELEPHONE ENCOUNTER
Spoke with patient. Explained that the soonest infusion appointment is on 1/6 but he is on the wait list if a sooner appointment time opens up. Also confirmed upcoming appoints on 12/30. Patient agreeable.

## 2024-12-24 NOTE — TELEPHONE ENCOUNTER
Pt states that he already spoke with someone and has no additional needs at this time. ----- Message from Carmen sent at 12/24/2024 10:09 AM CST -----  Type: Needs Medical Advice  Who Called:  patient  Symptoms (please be specific):    How long has patient had these symptoms:    Pharmacy name and phone #:    Best Call Back Number: [phone]  Additional Information: patient is requesting a call back from Sarah

## 2025-01-02 NOTE — PROGRESS NOTES
PATIENT: Fred Benjamin  MRN: [id number]  DATE: 1/3/2025      Diagnosis:   1. Malignant neoplasm of pancreas, unspecified location of malignancy    2. Immunosuppression due to drug therapy    3. Pulmonary nodules    4. Paroxysmal atrial fibrillation    5. Essential hypertension    6. Chemotherapy induced diarrhea    7. Hypomagnesemia        Chief Complaint: Clearance for C3 FOLFIRINOX      Oncologic History:   Oncology History   Pancreatic cancer   10/11/2024 Initial Diagnosis    Pancreatic adenocarcinoma     10/11/2024 Cancer Staged    Staging form: Exocrine Pancreas, AJCC 8th Edition  - Clinical: Stage IA (cT1c, cN0, cM0)     10/23/2024 -  Chemotherapy    Treatment Summary   Plan Name: OP GI FOLFIRINOX (oxaliplatin, leucovorin, irinotecan, fluorouracil) Q2W   Treatment Goal: Curative  Status: Active  Start Date: 10/23/2024  End Date: 11/12/2025 (Planned)  Provider: Stephan Suero MD  Chemotherapy: fluorouraciL injection 865 mg, 400 mg/m2 = 865 mg, Intravenous, Clinic/HOD 1 time, 2 of 26 cycles  Dose modification: 360 mg/m2 (original dose 400 mg/m2, Cycle 2, Reason: Other (see comments), Comment: Diarrhea)  Administration: 750 mg (12/9/2024), 865 mg (10/23/2024)  irinotecan (CAMPTOSAR) 380 mg in 0.9% NaCl 584 mL chemo infusion, 388 mg, Intravenous, Clinic/HOD 1 time, 2 of 26 cycles  Dose modification: 165 mg/m2 (original dose 180 mg/m2, Cycle 2, Reason: Other (see comments), Comment: Diarrhea)  Administration: 340 mg (12/9/2024), 380 mg (10/23/2024)  oxaliplatin (ELOXATIN) 85 mg/m2 = 184 mg in D5W 601.8 mL chemo infusion, 85 mg/m2 = 184 mg, Intravenous, Clinic/HOD 1 time, 2 of 26 cycles  Administration: 178 mg (12/9/2024), 184 mg (10/23/2024)  fluorouracil (Adrucil) 5,000 mg in 100 mL chemo infusion, 5,185 mg, Intravenous, Over 46 hours, 2 of 26 cycles  Dose modification: 2,000 mg/m2 (original dose 2,400 mg/m2, Cycle 2, Reason: Other (see comments), Comment: Diarrhea)  Administration: 4,000 mg (12/9/2024),  5,000 mg (10/23/2024)             Subjective:    Interval History:  Mr. Benjamin is a 69 y.o. male with Arthritis, A-fib, GERD, HTN, BHUMIKA who presents for pancreatic cancer.  Since the last clinic visit the patient was admitted to [facility]  (12/17-12/20/24) for N/V, diarrhea, dehydration and [place]. Since d/c, patient is feeling much better, noting his diarrhea has resolved into soft stools. Patient had a hospitalization for his first cycle of FOLFIRINOX as well for same reasons. Patient states he did have a near syncope episode when he went for an MRI roughly 10 days ago. No other episodes like this have happened since. He denies fevers, CP, fatigue, cough, SOB, diarrhea,constipation.  The patient denies night sweats, new lumps or bumps, easy bruising or bleeding.   Patient endorses neck pain that has been present since a surgery in 1982 that is causing him some issues which is chronic.    Prior History:   Pt initially underwent US abdomen 8/02/24 for work up of abdominal pain.  US showed mild intrahepatic ductal dilatation.  PT underwent EUS on 9/13/24 showing for stones in the common bile duct; mass in the pancreatic tail which was biopsied; cystic lesion in the pancreatic body.  Biopsy returned positive for adenocarcinoma.  The patient was discussed at tumor board on 09/18/2024 with recommendation for referral to Surgical Oncology and Medical Oncology.  The patient underwent CT chest, abdomen, and pelvis on 09/1924 showing a small pleural plaque of platelike atelectasis anteriorly in the right hemithorax; intra and extrahepatic biliary duct dilatation; faint density within the common duct which could represent faintly calcified stones, sludge, or debris; large amount of pancreatic fat stranding and fluid around the tail of the pancreas.  Patient underwent ERCP on 09/20/2024 showing dilatation of the entire main bile duct with stone causing obstruction.  Patient underwent ampullary dilatation and biliary sphincterotomy  with the biliary tree swept.  Patient met with surgical oncologist Dr. Butler on 09/25/2024 whom recommended 4 cycles chemotherapy followed by restaging and consideration for distal pancreatectomy.  The patient went to [facility] on 10/03/2024 and underwent CT chest, abdomen, and pelvis showing Variable sized pulmonary nodules predominantly within the left lung with a dominant lesion having irregular border measuring 12 mm in the next most prominent nodule measuring 7 mm; mild emphysema; bilobed fluid or 6 appearing lesion in the pancreatic tail; and chronic dilatation of the common bile duct.  The patient met with medical oncologist Dr. Olson on 10/03/2024 whom recommended 12 cycles of FOLFIRINOX and perioperative management.  Patient underwent cycle 1 of FOLFIRINOX on 10/23/2024 which was then followed by 2 hospitalizations: 11/5/24-11/9/24 for diarrhea subsequently diagnosed with c.diff and sent home on antibiotics and then again on 11/26/24-11/28/24 for flash pulmonary edema and respiratory failure. Patient has then recovered with resolution of diarrhea and SOB.    Past Medical History:   Past Medical History:   Diagnosis Date    Anemia     Arthritis     Cancer 03/2017    Bladder    Cancer related pain     Chronic diastolic CHF (congestive heart failure)     GERD (gastroesophageal reflux disease)     Headache(784.0)     Hypertension     Pacemaker     Pancreatic cancer     chemo    Paroxysmal atrial fibrillation     Prediabetes     Rash     Sleep apnea     no cpap       Past Surgical HIstory:   Past Surgical History:   Procedure Laterality Date    ABCESS DRAINAGE  06/01/2018    I&D with irrigation abcess left lower back    ABLATION      ATRIAL ABLATION SURGERY      x 2    BLADDER TUMOR EXCISION  03/14/2017     cysto/Cancer    CARDIAC PACEMAKER PLACEMENT      CARDIOVERSION      carpel tunnel Bilateral     CERVICAL FUSION      x3    CHOLECYSTECTOMY      COLONOSCOPY      ENDOSCOPIC ULTRASOUND OF UPPER  GASTROINTESTINAL TRACT Left 09/13/2024    Procedure: ULTRASOUND, UPPER GI TRACT, ENDOSCOPIC;  Surgeon: Gerald Ochoa MD;  Location: [facility] ENDO;  Service: Endoscopy;  Laterality: Left;    ERCP N/A 09/20/2024    Procedure: ERCP (ENDOSCOPIC RETROGRADE CHOLANGIOPANCREATOGRAPHY);  Surgeon: Mariano Ferreira III, MD;  Location: [facility] ENDO;  Service: Endoscopy;  Laterality: N/A;    ESOPHAGUS SURGERY      ting and then reversal    FOOT FRACTURE SURGERY      FRACTURE SURGERY      INCISION AND DRAINAGE OF ABSCESS Left 06/01/2018    Procedure: INCISION AND DRAINAGE, ABSCESS - left, lower back;  Surgeon: Irving Edouard MD;  Location: [facility] OR;  Service: Orthopedics;  Laterality: Left;    INSERTION OF TUNNELED CENTRAL VENOUS CATHETER (CVC) WITH SUBCUTANEOUS PORT N/A 10/18/2024    Procedure: [id number];  Surgeon: Irving Alicia MD;  Location: [facility] OR;  Service: General;  Laterality: N/A;    KNEE ARTHROSCOPY W/ MENISCAL REPAIR Left     ROTATOR CUFF REPAIR Left        Family History:   Family History   Problem Relation Name Age of Onset    Coronary artery disease Mother      Diabetes Mother      Cancer Father          Lymphoma       Social History:  reports that he has quit smoking. His smoking use included cigars and cigarettes. He has a 20 pack-year smoking history. He has been exposed to tobacco smoke. He has never used smokeless tobacco. He reports that he does not drink alcohol and does not use drugs.    Allergies:  Review of patient's allergies indicates:   Allergen Reactions    Cleocin [clindamycin hcl] Shortness Of Breath     Constipation    Amiodarone     Amiodarone analogues      Affected thyroid    Dabigatran etexilate     Rythmol [propafenone]      Weight loss/ nausea       Medications:  Current Outpatient Medications   Medication Sig Dispense Refill    ammonium lactate 12 % Crea Apply 1 Application topically 2 (two) times a day. 385 g 5    ELIQUIS 5 mg Tab Take 5 mg by mouth 2 (two) times daily.        Lactobacillus rhamnosus GG (CULTURELLE) 10 billion cell capsule Take 1 capsule by mouth once daily. for 14 days 14 capsule 0    loperamide (IMODIUM) 2 mg capsule Take 2 capsules by mouth after first loose stool, then 1 capsule every 2 hours until diarrhea free for 12 hours. May take 2 capsules by mouth every 4 hours at night. May require more than the package labeling maximum dose of 8 capsules per day. 30 capsule 11    morphine (MS CONTIN) 60 MG 12 hr tablet Take 60 mg by mouth 2 (two) times daily.      ondansetron (ZOFRAN-ODT) 4 MG TbDL Take 1 tablet (4 mg total) by mouth every 6 (six) hours as needed (for nausea).      oxyCODONE (ROXICODONE) 30 MG Tab Take 30 mg by mouth 4 (four) times daily as needed. *never started/purchased - rx post dated for 11/27/24 by Physician      potassium chloride SA (K-DUR,KLOR-CON) 20 MEQ tablet Take 1 tablet (20 mEq total) by mouth once daily.      sotaloL (BETAPACE AF) 160 MG Tab Take 160 mg by mouth 2 (two) times daily.      pilocarpine (SALAGEN) 5 MG Tab Take 5 mg by mouth 3 (three) times daily. (Patient not taking: Reported on 1/3/2025)       No current facility-administered medications for this visit.       Review of Systems   Constitutional:  Negative for chills, diaphoresis, fatigue, fever and unexpected weight change.   Respiratory:  Negative for cough and shortness of breath.    Cardiovascular:  Negative for chest pain and palpitations.   Gastrointestinal:  Positive for abdominal pain. Negative for constipation, diarrhea, nausea and vomiting.        Endorses loose stool, no diarrhea   Musculoskeletal:         Left hip pain   Skin:  Negative for color change and rash.   Neurological:  Positive for dizziness (intermittent) and headaches (attributes to his neck/cervical pain). Negative for weakness.   Hematological:  Negative for adenopathy. Does not bruise/bleed easily.       ECOG Performance Status: 1   Objective:    Weight:  Wt Readings from Last 3 Encounters:  "  01/03/25 87.5 kg (192 lb 14.4 oz)   12/20/24 87.4 kg (192 lb 10.9 oz)   12/09/24 87.6 kg (193 lb 2 oz)     Vitals:   Vitals:    01/03/25 0940   BP: 120/89   BP Location: Left arm   Patient Position: Sitting   Pulse: 86   Resp: 18   Temp: 97 °F (36.1 °C)   TempSrc: Temporal   SpO2: 96%   Weight: 87.5 kg (192 lb 14.4 oz)   Height: 5' 4" (1.626 m)       Physical Exam  Vitals reviewed.   Constitutional:       General: He is not in acute distress.     Appearance: He is well-developed. He is not ill-appearing or diaphoretic.   HENT:      Head: Normocephalic and atraumatic.   Eyes:      Conjunctiva/sclera: Conjunctivae normal.   Cardiovascular:      Rate and Rhythm: Normal rate and regular rhythm.      Heart sounds: Normal heart sounds. No murmur heard.     No friction rub. No gallop.   Pulmonary:      Effort: Pulmonary effort is normal. No respiratory distress.      Breath sounds: Normal breath sounds. No wheezing or rales.   Chest:      Chest wall: No tenderness.   Abdominal:      General: Bowel sounds are normal. There is no distension.      Palpations: Abdomen is soft. There is no mass.      Tenderness: There is abdominal tenderness (RUQ). There is no rebound.   Musculoskeletal:      Cervical back: Neck supple.      Right lower leg: No edema.      Left lower leg: No edema.   Lymphadenopathy:      Cervical: No cervical adenopathy.      Upper Body:      Right upper body: No supraclavicular or axillary adenopathy.      Left upper body: No supraclavicular or axillary adenopathy.   Skin:     General: Skin is warm and dry.      Coloration: Skin is pale.      Findings: No erythema or rash.   Neurological:      Mental Status: He is alert and oriented to person, place, and time.   Psychiatric:         Behavior: Behavior normal.         Thought Content: Thought content normal.         Laboratory Data:  Lab Visit on 01/03/2025   Component Date Value Ref Range Status    WBC 01/03/2025 6.55  3.90 - 12.70 K/uL Final    RBC " 01/03/2025 4.11 (L)  4.60 - 6.20 M/uL Final    Hemoglobin 01/03/2025 10.6 (L)  14.0 - 18.0 g/dL Final    Hematocrit 01/03/2025 33.6 (L)  40.0 - 54.0 % Final    MCV 01/03/2025 82  82 - 98 fL Final    MCH 01/03/2025 25.8 (L)  27.0 - 31.0 pg Final    MCHC 01/03/2025 31.5 (L)  32.0 - 36.0 g/dL Final    RDW 01/03/2025 17.8 (H)  11.5 - 14.5 % Final    Platelets 01/03/2025 445  150 - 450 K/uL Final    MPV 01/03/2025 9.1 (L)  9.2 - 12.9 fL Final    Immature Granulocytes 01/03/2025 0.8 (H)  0.0 - 0.5 % Final    Gran # (ANC) 01/03/2025 4.5  1.8 - 7.7 K/uL Final    Immature Grans (Abs) 01/03/2025 0.05 (H)  0.00 - 0.04 K/uL Final    Comment: Mild elevation in immature granulocytes is non specific and   can be seen in a variety of conditions including stress response,   acute inflammation, trauma and pregnancy. Correlation with other   laboratory and clinical findings is essential.      Lymph # 01/03/2025 1.3  1.0 - 4.8 K/uL Final    Mono # 01/03/2025 0.6  0.3 - 1.0 K/uL Final    Eos # 01/03/2025 0.1  0.0 - 0.5 K/uL Final    Baso # 01/03/2025 0.09  0.00 - 0.20 K/uL Final    nRBC 01/03/2025 0  0 /100 WBC Final    Gran % 01/03/2025 68.5  38.0 - 73.0 % Final    Lymph % 01/03/2025 19.1  18.0 - 48.0 % Final    Mono % 01/03/2025 8.7  4.0 - 15.0 % Final    Eosinophil % 01/03/2025 1.5  0.0 - 8.0 % Final    Basophil % 01/03/2025 1.4  0.0 - 1.9 % Final    Platelet Estimate 01/03/2025 Appears normal   Final    Aniso 01/03/2025 Slight   Final    Poik 01/03/2025 Slight   Final    Hypo 01/03/2025 Occasional   Final    Ovalocytes 01/03/2025 Occasional   Final    Differential Method 01/03/2025 Automated   Final    Sodium 01/03/2025 144  136 - 145 mmol/L Final    Potassium 01/03/2025 4.3  3.5 - 5.1 mmol/L Final    Chloride 01/03/2025 111 (H)  95 - 110 mmol/L Final    CO2 01/03/2025 24  23 - 29 mmol/L Final    Glucose 01/03/2025 98  70 - 110 mg/dL Final    BUN 01/03/2025 5 (L)  8 - 23 mg/dL Final    Creatinine 01/03/2025 1.0  0.5 - 1.4 mg/dL  Final    Calcium 01/03/2025 8.3 (L)  8.7 - 10.5 mg/dL Final    Total Protein 01/03/2025 6.3  6.0 - 8.4 g/dL Final    Albumin 01/03/2025 2.3 (L)  3.5 - 5.2 g/dL Final    Total Bilirubin 01/03/2025 0.2  0.1 - 1.0 mg/dL Final    Comment: For infants and newborns, interpretation of results should be based  on gestational age, weight and in agreement with clinical  observations.    Premature Infant recommended reference ranges:  Up to 24 hours.............<8.0 mg/dL  Up to 48 hours............<12.0 mg/dL  3-5 days..................<15.0 mg/dL  6-29 days.................<15.0 mg/dL      Alkaline Phosphatase 01/03/2025 107  40 - 150 U/L Final    AST 01/03/2025 25  10 - 40 U/L Final    ALT 01/03/2025 12  10 - 44 U/L Final    eGFR 01/03/2025 >60.0  >60 mL/min/1.73 m^2 Final    Anion Gap 01/03/2025 9  8 - 16 mmol/L Final    Magnesium 01/03/2025 1.4 (L)  1.6 - 2.6 mg/dL Final         Imaging:     PET/CT 10/17/24    Quality of the study: Adequate.     Head and neck: Small mildly hypermetabolic focus in the right parotid gland with maximum SUV of 3.3, favored to represent a reactive lymph node.  No pathologic lymphadenopathy.     Chest: There are no hypermetabolic lesions worrisome for malignancy.  There are no concerning pulmonary nodules or masses, and there are no pathologically enlarged or hypermetabolic lymph nodes.     Abdomen and pelvis: No abnormal hypermetabolism in the pancreatic tail.  Significant improvement of prior fat stranding and edema surrounding pancreatic tail.  There is new pneumobilia.     Hypermetabolic subcutaneous soft tissue thickening in the midline inferior perineum and medial left thigh (image [phone]).  Increased radiotracer uptake in a normal appearing left inguinal lymph node, likely reactive.     There is physiologic tracer distribution within the abdominal organs and excretion into the genitourinary system.     Bones: There are no hypermetabolic lesions worrisome for malignancy.        Assessment:       1. Malignant neoplasm of pancreas, unspecified location of malignancy    2. Immunosuppression due to drug therapy    3. Pulmonary nodules    4. Paroxysmal atrial fibrillation    5. Essential hypertension    6. Chemotherapy induced diarrhea    7. Hypomagnesemia             Plan:   Adenocarcinoma of the Pancreas - patient with adenocarcinoma in the tail of the pancreas status post biopsy 09/13/2024  -CT chest, abdomen, and pelvis on 10/03/2024 showed a lesion in the tail of the pancreas as well as multiple pulmonary nodules with 1 nodule measuring at least 12 mm  -Will obtain PET-CT  -Pt seen by Dr Butler in surgical oncology at [facility] 09/25/2024 whom recommended 4 cycles of chemo followed by restaging  -Patient seen by  at [facility] on 10/03/2024 recommending perioperative FOLFIRINOX with elimination of bolus fluorouracil and leucovorin  -Pharmacogenomic showed the patient to have normal UGT1A1 and DPYD genes  -Invitae germline testing negative  -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   Nodule in left hip felt to be trochanteric bursitis  -Will proceed with cycle 3 of FOLFIRINOX on 1/6/25 with dose reductions in both irinotecan (165mg/m2) and 5-FU (2000mg/m2). Will re-draw CBC, CMP and mag on 1/8/25 to see if patient needs hydration or electrolyte replacement. May continue this for remainder of week depending on levels to help deter re-hospitalization.     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 - pt at increased risk of infection  -Will monitor    Decaresed eGFR - GFR back to normal on 1/3/25    Chemotherapy induced diarrhea - from Irinotecan  -improved at this time  -Will monitor    Pulmonary Nodules - Seen on Ct chest 10/03/24 with largest nodule in left lung  -PET/CT 10/17/24 showed no concerning nodules  -Will monitor    Hypomagnesemia - due to chemotherapy mag 1.4 on  1/3/25  -Will add 2gm mag IV on 1/6/25 with chemo  -Will monitor    A-fib - pt on eliquis  -Cardiology managing  -Will monitor    HTN - pt on amlodipine, sotalol  -/81 in clinic today  -Will monitor    GERD - pt on protonix  -Will monitor    B12 Deficiency - low on 8/02/24 at 195pg/mL  -Pt on B12 injection monthly  -Will monitor    Trochanteric Bursitis - in the left hip  -Causing left hip pain and seen on PET/CT  -Will monitor    Hx of C-diff  Hospitalized 11/05 - 11/08: Tx with 10 days Vanc & Tamiflu for Flu A post hospitalization   11/19/24:  diarrhea persistent; collect stool for c-diff; discussed with Dr. Suero - Fidaxomicin next line of tx (200 mg p.o. bid x 10 day)  -12/6/2024: diarrhea completely resolved    Visit today included increased complexity associated with the care of the episodic problem chemotherapy addressed and managing the longitudinal care of the patient due to the serious and/or complex managed problem(s) pancreatic cancer    SHANNON Moreland  Hematology and Medical Oncology  [facility]  [facility] [place] [facility]    60 minutes of total time spent on the encounter, which includes face to face time and non-face to face time preparing to see the patient (eg, review of tests), Obtaining and/or reviewing separately obtained history, documenting clinical information in the electronic or other health record, independently interpreting results if documented above (not separately reported) and communicating results to the patient/family/caregiver, or Care coordination (not separately reported).        Route Chart for Scheduling    Med Onc Chart Routing      Follow up with physician 2 weeks. Patient to get CBC, CMP, mag on 1/20/25 prior to seeing Dr. Suero with FOLFIRINOX to follow on same day. D/C pump on day 3 and OBI.   Follow up with KB . Proceed with cycle 3 of FOLFIRINOX with 2 gm magnesium IV added. On day of pump d/c, will need to draw STAT CBC, mag and  CMP. Depending on levels, patient may need to get 1L NS and electrolyte replacement x2 days.   Infusion scheduling note    Injection scheduling note    Labs    Imaging    Pharmacy appointment    Other referrals                Treatment Plan Information   OP GI FOLFIRINOX (oxaliplatin, leucovorin, irinotecan, fluorouracil) Q2W  Stephan Suero MD   Associated diagnosis: Pancreatic cancer Stage IA cT1c, cN0, cM0 noted on 10/11/2024   Line of treatment: Neoadjuvant  Treatment Goal: Curative     Upcoming Treatment Dates - OP GI FOLFIRINOX (oxaliplatin, leucovorin, irinotecan, fluorouracil) Q2W     12/23/2024       Chemotherapy       oxaliplatin (ELOXATIN) in D5W 533.8 mL chemo infusion       leucovorin calcium in D5W 250 mL infusion       irinotecan (CAMPTOSAR) in 0.9% NaCl 516.4 mL chemo infusion       fluorouraciL injection       fluorouracil chemo infusion       Antiemetics       palonosetron 0.25mg/dexAMETHasone 12mg in NS IVPB 0.25 mg 50 mL  1/6/2025       Chemotherapy       oxaliplatin (ELOXATIN) in D5W 533.8 mL chemo infusion       leucovorin calcium in D5W 250 mL infusion       irinotecan (CAMPTOSAR) in 0.9% NaCl 516.4 mL chemo infusion       fluorouraciL injection       fluorouracil chemo infusion       Antiemetics       palonosetron 0.25mg/dexAMETHasone 12mg in NS IVPB 0.25 mg 50 mL  1/20/2025       Chemotherapy       oxaliplatin (ELOXATIN) in D5W 533.8 mL chemo infusion       leucovorin calcium in D5W 250 mL infusion       irinotecan (CAMPTOSAR) in 0.9% NaCl 516.4 mL chemo infusion       fluorouraciL injection       fluorouracil chemo infusion       Antiemetics       palonosetron 0.25mg/dexAMETHasone 12mg in NS IVPB 0.25 mg 50 mL  2/3/2025       Chemotherapy       oxaliplatin (ELOXATIN) in D5W 533.8 mL chemo infusion       leucovorin calcium in D5W 250 mL infusion       irinotecan (CAMPTOSAR) in 0.9% NaCl 516.4 mL chemo infusion       fluorouraciL injection       fluorouracil chemo infusion        Antiemetics       palonosetron 0.25mg/dexAMETHasone 12mg in NS IVPB 0.25 mg 50 mL    Supportive Plan Information  IV FLUIDS AND ELECTROLYTES Truman Gr NP   Associated Diagnosis: Dehydration   noted on 11/5/2024  Associated Diagnosis: Chemotherapy induced diarrhea   noted on 11/1/2024  Associated Diagnosis: Diarrhea   noted on 11/19/2024   Line of treatment: Supportive Care   Treatment goal: Supportive     Upcoming Treatment Dates - IV FLUIDS AND ELECTROLYTES    No upcoming days in selected categories.

## 2025-01-03 ENCOUNTER — OFFICE VISIT (OUTPATIENT)
Dept: HEMATOLOGY/ONCOLOGY | Facility: CLINIC | Age: 70
End: 2025-01-03
Payer: MEDICARE

## 2025-01-03 ENCOUNTER — LAB VISIT (OUTPATIENT)
Dept: LAB | Facility: HOSPITAL | Age: 70
End: 2025-01-03
Attending: INTERNAL MEDICINE
Payer: MEDICARE

## 2025-01-03 VITALS
HEIGHT: 64 IN | WEIGHT: 192.88 LBS | DIASTOLIC BLOOD PRESSURE: 89 MMHG | BODY MASS INDEX: 32.93 KG/M2 | TEMPERATURE: 97 F | OXYGEN SATURATION: 96 % | RESPIRATION RATE: 18 BRPM | SYSTOLIC BLOOD PRESSURE: 120 MMHG | HEART RATE: 86 BPM

## 2025-01-03 DIAGNOSIS — D84.821 IMMUNOSUPPRESSION DUE TO DRUG THERAPY: ICD-10-CM

## 2025-01-03 DIAGNOSIS — R91.8 PULMONARY NODULES: ICD-10-CM

## 2025-01-03 DIAGNOSIS — C25.9 MALIGNANT NEOPLASM OF PANCREAS, UNSPECIFIED LOCATION OF MALIGNANCY: Primary | ICD-10-CM

## 2025-01-03 DIAGNOSIS — Z79.899 IMMUNOSUPPRESSION DUE TO DRUG THERAPY: ICD-10-CM

## 2025-01-03 DIAGNOSIS — I10 ESSENTIAL HYPERTENSION: ICD-10-CM

## 2025-01-03 DIAGNOSIS — K52.1 CHEMOTHERAPY INDUCED DIARRHEA: ICD-10-CM

## 2025-01-03 DIAGNOSIS — C25.9 PANCREATIC ADENOCARCINOMA: ICD-10-CM

## 2025-01-03 DIAGNOSIS — E83.42 HYPOMAGNESEMIA: ICD-10-CM

## 2025-01-03 DIAGNOSIS — I48.0 PAROXYSMAL ATRIAL FIBRILLATION: ICD-10-CM

## 2025-01-03 DIAGNOSIS — T45.1X5A CHEMOTHERAPY INDUCED DIARRHEA: ICD-10-CM

## 2025-01-03 PROBLEM — D61.818 PANCYTOPENIA: Status: RESOLVED | Noted: 2024-12-17 | Resolved: 2025-01-03

## 2025-01-03 LAB
ALBUMIN SERPL BCP-MCNC: 2.3 G/DL (ref 3.5–5.2)
ALP SERPL-CCNC: 107 U/L (ref 40–150)
ALT SERPL W/O P-5'-P-CCNC: 12 U/L (ref 10–44)
ANION GAP SERPL CALC-SCNC: 9 MMOL/L (ref 8–16)
ANISOCYTOSIS BLD QL SMEAR: SLIGHT
AST SERPL-CCNC: 25 U/L (ref 10–40)
BASOPHILS # BLD AUTO: 0.09 K/UL (ref 0–0.2)
BASOPHILS NFR BLD: 1.4 % (ref 0–1.9)
BILIRUB SERPL-MCNC: 0.2 MG/DL (ref 0.1–1)
BUN SERPL-MCNC: 5 MG/DL (ref 8–23)
CALCIUM SERPL-MCNC: 8.3 MG/DL (ref 8.7–10.5)
CHLORIDE SERPL-SCNC: 111 MMOL/L (ref 95–110)
CO2 SERPL-SCNC: 24 MMOL/L (ref 23–29)
CREAT SERPL-MCNC: 1 MG/DL (ref 0.5–1.4)
DIFFERENTIAL METHOD BLD: ABNORMAL
EOSINOPHIL # BLD AUTO: 0.1 K/UL (ref 0–0.5)
EOSINOPHIL NFR BLD: 1.5 % (ref 0–8)
ERYTHROCYTE [DISTWIDTH] IN BLOOD BY AUTOMATED COUNT: 17.8 % (ref 11.5–14.5)
EST. GFR  (NO RACE VARIABLE): >60 ML/MIN/1.73 M^2
GLUCOSE SERPL-MCNC: 98 MG/DL (ref 70–110)
HCT VFR BLD AUTO: 33.6 % (ref 40–54)
HGB BLD-MCNC: 10.6 G/DL (ref 14–18)
HYPOCHROMIA BLD QL SMEAR: ABNORMAL
IMM GRANULOCYTES # BLD AUTO: 0.05 K/UL (ref 0–0.04)
IMM GRANULOCYTES NFR BLD AUTO: 0.8 % (ref 0–0.5)
LYMPHOCYTES # BLD AUTO: 1.3 K/UL (ref 1–4.8)
LYMPHOCYTES NFR BLD: 19.1 % (ref 18–48)
MAGNESIUM SERPL-MCNC: 1.4 MG/DL (ref 1.6–2.6)
MCH RBC QN AUTO: 25.8 PG (ref 27–31)
MCHC RBC AUTO-ENTMCNC: 31.5 G/DL (ref 32–36)
MCV RBC AUTO: 82 FL (ref 82–98)
MONOCYTES # BLD AUTO: 0.6 K/UL (ref 0.3–1)
MONOCYTES NFR BLD: 8.7 % (ref 4–15)
NEUTROPHILS # BLD AUTO: 4.5 K/UL (ref 1.8–7.7)
NEUTROPHILS NFR BLD: 68.5 % (ref 38–73)
NRBC BLD-RTO: 0 /100 WBC
OVALOCYTES BLD QL SMEAR: ABNORMAL
PLATELET # BLD AUTO: 445 K/UL (ref 150–450)
PLATELET BLD QL SMEAR: ABNORMAL
PMV BLD AUTO: 9.1 FL (ref 9.2–12.9)
POIKILOCYTOSIS BLD QL SMEAR: SLIGHT
POTASSIUM SERPL-SCNC: 4.3 MMOL/L (ref 3.5–5.1)
PROT SERPL-MCNC: 6.3 G/DL (ref 6–8.4)
RBC # BLD AUTO: 4.11 M/UL (ref 4.6–6.2)
SODIUM SERPL-SCNC: 144 MMOL/L (ref 136–145)
WBC # BLD AUTO: 6.55 K/UL (ref 3.9–12.7)

## 2025-01-03 PROCEDURE — 36415 COLL VENOUS BLD VENIPUNCTURE: CPT | Mod: PN | Performed by: INTERNAL MEDICINE

## 2025-01-03 PROCEDURE — 80053 COMPREHEN METABOLIC PANEL: CPT | Mod: PN | Performed by: INTERNAL MEDICINE

## 2025-01-03 PROCEDURE — 85025 COMPLETE CBC W/AUTO DIFF WBC: CPT | Mod: PN | Performed by: INTERNAL MEDICINE

## 2025-01-03 PROCEDURE — 99999 PR PBB SHADOW E&M-EST. PATIENT-LVL IV: CPT | Mod: PBBFAC,,, | Performed by: NURSE PRACTITIONER

## 2025-01-03 PROCEDURE — 99214 OFFICE O/P EST MOD 30 MIN: CPT | Mod: PBBFAC,PN | Performed by: NURSE PRACTITIONER

## 2025-01-03 PROCEDURE — 83735 ASSAY OF MAGNESIUM: CPT | Mod: PN | Performed by: INTERNAL MEDICINE

## 2025-01-03 RX ORDER — EPINEPHRINE 0.3 MG/.3ML
0.3 INJECTION SUBCUTANEOUS ONCE AS NEEDED
OUTPATIENT
Start: 2025-01-06

## 2025-01-03 RX ORDER — DIPHENHYDRAMINE HYDROCHLORIDE 50 MG/ML
50 INJECTION INTRAMUSCULAR; INTRAVENOUS ONCE AS NEEDED
OUTPATIENT
Start: 2025-01-06

## 2025-01-03 RX ORDER — PROCHLORPERAZINE EDISYLATE 5 MG/ML
5 INJECTION INTRAMUSCULAR; INTRAVENOUS ONCE AS NEEDED
OUTPATIENT
Start: 2025-01-08

## 2025-01-03 RX ORDER — ATROPINE SULFATE 0.4 MG/ML
0.4 INJECTION, SOLUTION ENDOTRACHEAL; INTRAMEDULLARY; INTRAMUSCULAR; INTRAVENOUS; SUBCUTANEOUS ONCE AS NEEDED
OUTPATIENT
Start: 2025-01-06

## 2025-01-03 RX ORDER — FLUOROURACIL 50 MG/ML
360 INJECTION, SOLUTION INTRAVENOUS
OUTPATIENT
Start: 2025-01-06

## 2025-01-03 RX ORDER — HEPARIN 100 UNIT/ML
500 SYRINGE INTRAVENOUS
OUTPATIENT
Start: 2025-01-08

## 2025-01-03 RX ORDER — SODIUM CHLORIDE 0.9 % (FLUSH) 0.9 %
10 SYRINGE (ML) INJECTION
OUTPATIENT
Start: 2025-01-08

## 2025-01-03 RX ORDER — PROCHLORPERAZINE EDISYLATE 5 MG/ML
5 INJECTION INTRAMUSCULAR; INTRAVENOUS ONCE AS NEEDED
OUTPATIENT
Start: 2025-01-06

## 2025-01-03 RX ORDER — HEPARIN 100 UNIT/ML
500 SYRINGE INTRAVENOUS
OUTPATIENT
Start: 2025-01-06

## 2025-01-03 RX ORDER — PILOCARPINE HYDROCHLORIDE 5 MG/1
5 TABLET, FILM COATED ORAL 3 TIMES DAILY
COMMUNITY
Start: 2024-12-31

## 2025-01-03 RX ORDER — SODIUM CHLORIDE 0.9 % (FLUSH) 0.9 %
10 SYRINGE (ML) INJECTION
OUTPATIENT
Start: 2025-01-06

## 2025-01-06 ENCOUNTER — INFUSION (OUTPATIENT)
Dept: INFUSION THERAPY | Facility: HOSPITAL | Age: 70
End: 2025-01-06
Attending: INTERNAL MEDICINE
Payer: MEDICARE

## 2025-01-06 ENCOUNTER — DOCUMENTATION ONLY (OUTPATIENT)
Dept: INFUSION THERAPY | Facility: HOSPITAL | Age: 70
End: 2025-01-06
Payer: MEDICARE

## 2025-01-06 VITALS
HEART RATE: 63 BPM | WEIGHT: 192.88 LBS | BODY MASS INDEX: 27 KG/M2 | RESPIRATION RATE: 16 BRPM | OXYGEN SATURATION: 97 % | SYSTOLIC BLOOD PRESSURE: 153 MMHG | TEMPERATURE: 98 F | DIASTOLIC BLOOD PRESSURE: 93 MMHG | HEIGHT: 71 IN

## 2025-01-06 DIAGNOSIS — C25.9 MALIGNANT NEOPLASM OF PANCREAS, UNSPECIFIED LOCATION OF MALIGNANCY: Primary | ICD-10-CM

## 2025-01-06 PROCEDURE — 96367 TX/PROPH/DG ADDL SEQ IV INF: CPT | Mod: PN

## 2025-01-06 PROCEDURE — 96416 CHEMO PROLONG INFUSE W/PUMP: CPT | Mod: PN

## 2025-01-06 PROCEDURE — 25000003 PHARM REV CODE 250: Mod: PN | Performed by: NURSE PRACTITIONER

## 2025-01-06 PROCEDURE — 96417 CHEMO IV INFUS EACH ADDL SEQ: CPT | Mod: PN

## 2025-01-06 PROCEDURE — 96415 CHEMO IV INFUSION ADDL HR: CPT | Mod: PN

## 2025-01-06 PROCEDURE — 63600175 PHARM REV CODE 636 W HCPCS: Mod: PN | Performed by: NURSE PRACTITIONER

## 2025-01-06 PROCEDURE — 96368 THER/DIAG CONCURRENT INF: CPT | Mod: PN

## 2025-01-06 PROCEDURE — 96375 TX/PRO/DX INJ NEW DRUG ADDON: CPT | Mod: PN

## 2025-01-06 PROCEDURE — 63600175 PHARM REV CODE 636 W HCPCS: Mod: PN | Performed by: INTERNAL MEDICINE

## 2025-01-06 PROCEDURE — 96413 CHEMO IV INFUSION 1 HR: CPT | Mod: PN

## 2025-01-06 RX ORDER — SODIUM CHLORIDE 0.9 % (FLUSH) 0.9 %
10 SYRINGE (ML) INJECTION
Status: DISCONTINUED | OUTPATIENT
Start: 2025-01-06 | End: 2025-01-06 | Stop reason: HOSPADM

## 2025-01-06 RX ORDER — ATROPINE SULFATE 0.4 MG/ML
0.4 INJECTION, SOLUTION ENDOTRACHEAL; INTRAMEDULLARY; INTRAMUSCULAR; INTRAVENOUS; SUBCUTANEOUS ONCE AS NEEDED
Status: COMPLETED | OUTPATIENT
Start: 2025-01-06 | End: 2025-01-06

## 2025-01-06 RX ORDER — FLUOROURACIL 50 MG/ML
360 INJECTION, SOLUTION INTRAVENOUS
Status: COMPLETED | OUTPATIENT
Start: 2025-01-06 | End: 2025-01-06

## 2025-01-06 RX ORDER — DIPHENHYDRAMINE HYDROCHLORIDE 50 MG/ML
50 INJECTION INTRAMUSCULAR; INTRAVENOUS ONCE AS NEEDED
Status: DISCONTINUED | OUTPATIENT
Start: 2025-01-06 | End: 2025-01-06 | Stop reason: HOSPADM

## 2025-01-06 RX ORDER — PROCHLORPERAZINE EDISYLATE 5 MG/ML
5 INJECTION INTRAMUSCULAR; INTRAVENOUS ONCE AS NEEDED
Status: DISCONTINUED | OUTPATIENT
Start: 2025-01-06 | End: 2025-01-06 | Stop reason: HOSPADM

## 2025-01-06 RX ORDER — EPINEPHRINE 0.3 MG/.3ML
0.3 INJECTION SUBCUTANEOUS ONCE AS NEEDED
Status: DISCONTINUED | OUTPATIENT
Start: 2025-01-06 | End: 2025-01-06 | Stop reason: HOSPADM

## 2025-01-06 RX ORDER — MAGNESIUM SULFATE HEPTAHYDRATE 40 MG/ML
2 INJECTION, SOLUTION INTRAVENOUS ONCE
Status: COMPLETED | OUTPATIENT
Start: 2025-01-06 | End: 2025-01-06

## 2025-01-06 RX ADMIN — ATROPINE SULFATE 0.4 MG: 0.4 INJECTION, SOLUTION INTRAVENOUS at 01:01

## 2025-01-06 RX ADMIN — FLUOROURACIL 4000 MG: 50 INJECTION, SOLUTION INTRAVENOUS at 03:01

## 2025-01-06 RX ADMIN — OXALIPLATIN 178 MG: 5 INJECTION, SOLUTION INTRAVENOUS at 11:01

## 2025-01-06 RX ADMIN — SODIUM CHLORIDE 340 MG: 9 INJECTION, SOLUTION INTRAVENOUS at 01:01

## 2025-01-06 RX ADMIN — SODIUM CHLORIDE: 9 INJECTION, SOLUTION INTRAVENOUS at 10:01

## 2025-01-06 RX ADMIN — DEXTROSE MONOHYDRATE: 5 INJECTION, SOLUTION INTRAVENOUS at 11:01

## 2025-01-06 RX ADMIN — DEXAMETHASONE SODIUM PHOSPHATE 0.25 MG: 10 INJECTION, SOLUTION INTRAMUSCULAR; INTRAVENOUS at 10:01

## 2025-01-06 RX ADMIN — LEUCOVORIN CALCIUM 700 MG: 350 INJECTION, POWDER, LYOPHILIZED, FOR SUSPENSION INTRAMUSCULAR; INTRAVENOUS at 01:01

## 2025-01-06 RX ADMIN — FLUOROURACIL 750 MG: 50 INJECTION, SOLUTION INTRAVENOUS at 03:01

## 2025-01-06 RX ADMIN — MAGNESIUM SULFATE IN WATER 2 G: 40 INJECTION, SOLUTION INTRAVENOUS at 11:01

## 2025-01-06 NOTE — PROGRESS NOTES
10:44 AM    This LCSW met with this pt to check in while he was receiving his infusion. The pt said he is doing well and had no practical needs to report at this time. The pt said if he needed me he said he would definitely ask for me.The pt was in good spirits and reported no practical needs at this time.

## 2025-01-06 NOTE — PROGRESS NOTES
Oncology Nutrition   Chemotherapy Infusion Visit    Nutrition Follow Up   RD met with pt at chairside during infusion tx. Pt present for cycle 3 Folfirinox. Pt reports to RD good appetite and denies any N/V/D/C. Pt weight has been stable for the past month.    Per pt chart, pt was hospitalized from 12/17/24-12/20/24 for N/V/D, dehydration, and [place]. Pt is feeling better.       Wt Readings from Last 10 Encounters:   01/06/25 87.5 kg (192 lb 14.4 oz)   01/03/25 87.5 kg (192 lb 14.4 oz)   12/20/24 87.4 kg (192 lb 10.9 oz)   12/09/24 87.6 kg (193 lb 2 oz)   12/06/24 87.6 kg (193 lb 2 oz)   11/27/24 83.9 kg (185 lb)   11/21/24 88 kg (194 lb 0.1 oz)   11/19/24 85.7 kg (188 lb 15 oz)   11/19/24 85.7 kg (188 lb 15 oz)   11/06/24 86.1 kg (189 lb 13.1 oz)       All other nutrition questions/concerns addressed as appropriate. Will continue to monitor prn throughout treatment.     Adelita Carroll, VANIAN, LDN  01/06/2025  11:56 AM

## 2025-01-06 NOTE — PLAN OF CARE
Pt tolerated Folfirinox regimen well.  Pt connected to portable chemo pump. To RTC on Wednesday to get disconnected from pump.  Instructed to call MD with any problems

## 2025-01-08 ENCOUNTER — INFUSION (OUTPATIENT)
Dept: INFUSION THERAPY | Facility: HOSPITAL | Age: 70
End: 2025-01-08
Attending: INTERNAL MEDICINE
Payer: MEDICARE

## 2025-01-08 VITALS
BODY MASS INDEX: 27 KG/M2 | SYSTOLIC BLOOD PRESSURE: 145 MMHG | TEMPERATURE: 98 F | DIASTOLIC BLOOD PRESSURE: 94 MMHG | WEIGHT: 192.88 LBS | HEART RATE: 60 BPM | HEIGHT: 71 IN | RESPIRATION RATE: 16 BRPM

## 2025-01-08 DIAGNOSIS — C25.9 MALIGNANT NEOPLASM OF PANCREAS, UNSPECIFIED LOCATION OF MALIGNANCY: Primary | ICD-10-CM

## 2025-01-08 LAB
ALBUMIN SERPL BCP-MCNC: 2.6 G/DL (ref 3.5–5.2)
ALP SERPL-CCNC: 90 U/L (ref 40–150)
ALT SERPL W/O P-5'-P-CCNC: 25 U/L (ref 10–44)
ANION GAP SERPL CALC-SCNC: 10 MMOL/L (ref 8–16)
AST SERPL-CCNC: 68 U/L (ref 10–40)
BASOPHILS # BLD AUTO: 0.03 K/UL (ref 0–0.2)
BASOPHILS NFR BLD: 0.5 % (ref 0–1.9)
BILIRUB SERPL-MCNC: 0.4 MG/DL (ref 0.1–1)
BUN SERPL-MCNC: 13 MG/DL (ref 8–23)
CALCIUM SERPL-MCNC: 8.1 MG/DL (ref 8.7–10.5)
CHLORIDE SERPL-SCNC: 103 MMOL/L (ref 95–110)
CO2 SERPL-SCNC: 28 MMOL/L (ref 23–29)
CREAT SERPL-MCNC: 0.9 MG/DL (ref 0.5–1.4)
DIFFERENTIAL METHOD BLD: ABNORMAL
EOSINOPHIL # BLD AUTO: 0.1 K/UL (ref 0–0.5)
EOSINOPHIL NFR BLD: 0.9 % (ref 0–8)
ERYTHROCYTE [DISTWIDTH] IN BLOOD BY AUTOMATED COUNT: 17.7 % (ref 11.5–14.5)
EST. GFR  (NO RACE VARIABLE): >60 ML/MIN/1.73 M^2
GLUCOSE SERPL-MCNC: 114 MG/DL (ref 70–110)
HCT VFR BLD AUTO: 33.8 % (ref 40–54)
HGB BLD-MCNC: 10.8 G/DL (ref 14–18)
IMM GRANULOCYTES # BLD AUTO: 0.02 K/UL (ref 0–0.04)
IMM GRANULOCYTES NFR BLD AUTO: 0.3 % (ref 0–0.5)
LYMPHOCYTES # BLD AUTO: 1.2 K/UL (ref 1–4.8)
LYMPHOCYTES NFR BLD: 18.1 % (ref 18–48)
MAGNESIUM SERPL-MCNC: 1.7 MG/DL (ref 1.6–2.6)
MCH RBC QN AUTO: 26.1 PG (ref 27–31)
MCHC RBC AUTO-ENTMCNC: 32 G/DL (ref 32–36)
MCV RBC AUTO: 82 FL (ref 82–98)
MONOCYTES # BLD AUTO: 0.4 K/UL (ref 0.3–1)
MONOCYTES NFR BLD: 6.3 % (ref 4–15)
NEUTROPHILS # BLD AUTO: 4.8 K/UL (ref 1.8–7.7)
NEUTROPHILS NFR BLD: 73.9 % (ref 38–73)
NRBC BLD-RTO: 0 /100 WBC
PLATELET # BLD AUTO: 482 K/UL (ref 150–450)
PMV BLD AUTO: 9.2 FL (ref 9.2–12.9)
POTASSIUM SERPL-SCNC: 3.7 MMOL/L (ref 3.5–5.1)
PROT SERPL-MCNC: 6.2 G/DL (ref 6–8.4)
RBC # BLD AUTO: 4.14 M/UL (ref 4.6–6.2)
SODIUM SERPL-SCNC: 141 MMOL/L (ref 136–145)
WBC # BLD AUTO: 6.46 K/UL (ref 3.9–12.7)

## 2025-01-08 PROCEDURE — 63600175 PHARM REV CODE 636 W HCPCS: Mod: JZ,TB,PN | Performed by: NURSE PRACTITIONER

## 2025-01-08 PROCEDURE — 83735 ASSAY OF MAGNESIUM: CPT | Mod: PN | Performed by: NURSE PRACTITIONER

## 2025-01-08 PROCEDURE — 85025 COMPLETE CBC W/AUTO DIFF WBC: CPT | Mod: PN | Performed by: NURSE PRACTITIONER

## 2025-01-08 PROCEDURE — 80053 COMPREHEN METABOLIC PANEL: CPT | Mod: PN | Performed by: NURSE PRACTITIONER

## 2025-01-08 PROCEDURE — 96361 HYDRATE IV INFUSION ADD-ON: CPT | Mod: PN

## 2025-01-08 PROCEDURE — 25000003 PHARM REV CODE 250: Mod: PN | Performed by: NURSE PRACTITIONER

## 2025-01-08 PROCEDURE — 96374 THER/PROPH/DIAG INJ IV PUSH: CPT | Mod: PN

## 2025-01-08 RX ORDER — SODIUM CHLORIDE 9 MG/ML
1000 INJECTION, SOLUTION INTRAVENOUS ONCE
Status: COMPLETED | OUTPATIENT
Start: 2025-01-08 | End: 2025-01-08

## 2025-01-08 RX ORDER — SODIUM CHLORIDE 0.9 % (FLUSH) 0.9 %
10 SYRINGE (ML) INJECTION
Status: DISCONTINUED | OUTPATIENT
Start: 2025-01-08 | End: 2025-01-08 | Stop reason: HOSPADM

## 2025-01-08 RX ORDER — HEPARIN 100 UNIT/ML
500 SYRINGE INTRAVENOUS
Status: DISCONTINUED | OUTPATIENT
Start: 2025-01-08 | End: 2025-01-08 | Stop reason: HOSPADM

## 2025-01-08 RX ORDER — PROCHLORPERAZINE EDISYLATE 5 MG/ML
5 INJECTION INTRAMUSCULAR; INTRAVENOUS ONCE AS NEEDED
Status: DISCONTINUED | OUTPATIENT
Start: 2025-01-08 | End: 2025-01-08 | Stop reason: HOSPADM

## 2025-01-08 RX ADMIN — PROCHLORPERAZINE EDISYLATE 5 MG: 5 INJECTION INTRAMUSCULAR; INTRAVENOUS at 01:01

## 2025-01-08 RX ADMIN — SODIUM CHLORIDE 1000 ML: 9 INJECTION, SOLUTION INTRAVENOUS at 02:01

## 2025-01-08 RX ADMIN — PEGFILGRASTIM 6 MG: KIT SUBCUTANEOUS at 01:01

## 2025-01-08 NOTE — PLAN OF CARE
Problem: Fatigue  Goal: Improved Activity Tolerance  Description: Rest periods  Outcome: Progressing  Intervention: Promote Improved Energy  Flowsheets (Taken 1/8/2025 1517)  Sleep/Rest Enhancement:   awakenings minimized   natural light exposure provided   noise level reduced   room darkened  Activity Management:   Ambulated in [place] - L4   Up in chair - L3  Environmental Support:   calm environment promoted   distractions minimized     Problem: Adult Inpatient Plan of Care  Goal: Plan of Care Review  Outcome: Progressing  Flowsheets (Taken 1/8/2025 1517)  Plan of Care Reviewed With: patient  Goal: Patient-Specific Goal (Individualized)  Outcome: Progressing  Flowsheets (Taken 1/8/2025 1517)  Individualized Care Needs: recliner, lights dimmed, conversation, nausea meds, pillow  Anxieties, Fears or Concerns: ongoing nausea  Patient/Family-Specific Goals (Include Timeframe): reduced nasuea while here  Goal: Optimal Comfort and Wellbeing  Outcome: Progressing  Intervention: Provide Person-Centered Care  Flowsheets (Taken 1/8/2025 1517)  Trust Relationship/Rapport:   care explained   questions answered   choices provided   empathic listening provided  CBC/CMP/Mag blood work drawn upon arrival.  Tubes send to lab and S.S., NP notified when resulted.  Got OK to give 1L NS while awaiting blood work results.     Pt tolerated D/C pump with IVF infusion well.  No adverse reaction noted.  Pt c/o nausea on arrival, 5mg compazine IVP given.  PAD flushed with NS and de-accessed per protocol.  Patient left clinic in no acute distress with wife.

## 2025-01-09 ENCOUNTER — TELEPHONE (OUTPATIENT)
Dept: INFUSION THERAPY | Facility: HOSPITAL | Age: 70
End: 2025-01-09
Payer: MEDICARE

## 2025-01-09 NOTE — TELEPHONE ENCOUNTER
Spoke to pt in regards to fluids appt today and pt states he is not coming. Inquired about how pt is feeling and he states he is vomiting and has diarrhea a lot. Inquired about pt taking imodium and he said yes and stated that if he came for fluids he would feel better. Pt states he is staying in bed and call ended.

## 2025-01-10 ENCOUNTER — TELEPHONE (OUTPATIENT)
Dept: HEMATOLOGY/ONCOLOGY | Facility: CLINIC | Age: 70
End: 2025-01-10
Payer: MEDICARE

## 2025-01-10 NOTE — TELEPHONE ENCOUNTER
Patient called asking for direct admission for diarrha/ nause and vomitting. Advised patient he would need to go through Er for this.

## 2025-01-16 ENCOUNTER — TELEPHONE (OUTPATIENT)
Dept: HEMATOLOGY/ONCOLOGY | Facility: CLINIC | Age: 70
End: 2025-01-16
Payer: MEDICARE

## 2025-01-16 PROBLEM — N17.9 SEPSIS WITH ACUTE RENAL FAILURE: Status: ACTIVE | Noted: 2025-01-16

## 2025-01-16 PROBLEM — D84.9 IMMUNOCOMPROMISED PATIENT: Status: RESOLVED | Noted: 2024-11-05 | Resolved: 2025-01-16

## 2025-01-16 PROBLEM — R65.20 SEPSIS WITH ACUTE RENAL FAILURE: Status: ACTIVE | Noted: 2025-01-16

## 2025-01-16 PROBLEM — A41.9 SEPSIS WITH ACUTE RENAL FAILURE: Status: ACTIVE | Noted: 2025-01-16

## 2025-01-16 NOTE — TELEPHONE ENCOUNTER
Spoke to Stacia who said patient is calling asking for nausea meds and to be direct admitted to hospital for nausea and diarrhea. She read that he was instructed to report to Er last week for this and wanted to reach out to let us know. She encouraged patient to do the same and to follow our recommendations. I tried to call patient as well to see what I can do but no answer x 2

## 2025-01-16 NOTE — TELEPHONE ENCOUNTER
----- Message from [employer] sent at 1/16/2025  9:20 AM CST -----  Type: Needs Medical Advice  Who Called:  Stacia Mina Call Back Number: [phone]   Additional Information: Stacia sanchez [facility] wants to talk To above provider , please call to further Assist  thank you .

## 2025-01-17 PROBLEM — T45.1X5A CHEMOTHERAPY ADVERSE REACTION: Status: ACTIVE | Noted: 2025-01-17

## 2025-01-17 PROBLEM — T45.1X5A CHEMOTHERAPY INDUCED NAUSEA AND VOMITING: Status: ACTIVE | Noted: 2024-11-06

## 2025-01-17 PROBLEM — E44.1 MILD MALNUTRITION: Status: ACTIVE | Noted: 2025-01-17

## 2025-01-17 PROBLEM — N17.9 ACUTE RENAL FAILURE: Status: ACTIVE | Noted: 2025-01-17

## 2025-01-22 ENCOUNTER — TELEPHONE (OUTPATIENT)
Dept: HEMATOLOGY/ONCOLOGY | Facility: CLINIC | Age: 70
End: 2025-01-22
Payer: MEDICARE

## 2025-01-22 NOTE — TELEPHONE ENCOUNTER
----- Message from [place] sent at 1/22/2025 12:16 PM CST -----  Type: Needs Medical Advice  Who Called:  Summer with [facility]  Symptoms (please be specific):    How long has patient had these symptoms:    Pharmacy name and phone #:    Best Call Back Number: [phone]  Additional Information: Patient is needing a call back to schedule a 1 wk Hosp. f/u appt.. Patient discharged on 1/20.

## 2025-01-24 ENCOUNTER — TELEPHONE (OUTPATIENT)
Dept: HEMATOLOGY/ONCOLOGY | Facility: CLINIC | Age: 70
End: 2025-01-24
Payer: MEDICARE

## 2025-01-24 NOTE — TELEPHONE ENCOUNTER
----- Message from Carmen sent at 1/24/2025  8:46 AM CST -----  Type: Needs Medical Advice  Who Called:  patient   Symptoms (please be specific):    How long has patient had these symptoms:    Pharmacy name and phone #:    Best Call Back Number: [phone]  Additional Information: pt called to cancel his appt due to him falling

## 2025-01-24 NOTE — TELEPHONE ENCOUNTER
Patient states had a fall last night and cannot walk due to pain in knee and hip. Has a call into his ortho for care of this. He wants to cancel his appt with Dr Suero today and tx on Monday and will call back to reschedule all once ortho evaluates and he can walk again. Will inform infusion of this as wel.

## 2025-01-27 ENCOUNTER — TELEPHONE (OUTPATIENT)
Dept: HEMATOLOGY/ONCOLOGY | Facility: CLINIC | Age: 70
End: 2025-01-27
Payer: MEDICARE

## 2025-01-27 NOTE — TELEPHONE ENCOUNTER
----- Message from Carmen sent at 1/27/2025  9:47 AM CST -----  Type: Needs Medical Advice  Who Called:  pt  Symptoms (please be specific):    How long has patient had these symptoms:    Pharmacy name and phone #:    Best Call Back Number: [phone]  Additional Information: pt is requesting a call back to reschedule his appt with Dr Suero and his lab appt from 1/24 due to him falling

## 2025-01-27 NOTE — TELEPHONE ENCOUNTER
Patjohnt wanted to schedule missed appt . I scheduled for 1/28 at 1pm with Dr Martinez with labs prior and notified infusion of this.

## 2025-01-28 ENCOUNTER — OFFICE VISIT (OUTPATIENT)
Dept: HEMATOLOGY/ONCOLOGY | Facility: CLINIC | Age: 70
End: 2025-01-28
Payer: MEDICARE

## 2025-01-28 ENCOUNTER — HOSPITAL ENCOUNTER (OUTPATIENT)
Dept: RADIOLOGY | Facility: HOSPITAL | Age: 70
Discharge: HOME | End: 2025-01-28
Attending: INTERNAL MEDICINE
Payer: MEDICARE

## 2025-01-28 VITALS
RESPIRATION RATE: 14 BRPM | WEIGHT: 189.63 LBS | BODY MASS INDEX: 26.55 KG/M2 | TEMPERATURE: 98 F | DIASTOLIC BLOOD PRESSURE: 84 MMHG | SYSTOLIC BLOOD PRESSURE: 116 MMHG | HEIGHT: 71 IN | HEART RATE: 91 BPM | OXYGEN SATURATION: 95 %

## 2025-01-28 DIAGNOSIS — D64.9 NORMOCYTIC ANEMIA: ICD-10-CM

## 2025-01-28 DIAGNOSIS — I10 ESSENTIAL HYPERTENSION: ICD-10-CM

## 2025-01-28 DIAGNOSIS — R91.8 PULMONARY NODULES: ICD-10-CM

## 2025-01-28 DIAGNOSIS — L02.211 CUTANEOUS ABSCESS OF ABDOMINAL WALL: ICD-10-CM

## 2025-01-28 DIAGNOSIS — I48.0 PAROXYSMAL ATRIAL FIBRILLATION: ICD-10-CM

## 2025-01-28 DIAGNOSIS — D84.821 IMMUNOSUPPRESSION DUE TO DRUG THERAPY: ICD-10-CM

## 2025-01-28 DIAGNOSIS — C25.9 MALIGNANT NEOPLASM OF PANCREAS, UNSPECIFIED LOCATION OF MALIGNANCY: Primary | ICD-10-CM

## 2025-01-28 DIAGNOSIS — Z79.899 IMMUNOSUPPRESSION DUE TO DRUG THERAPY: ICD-10-CM

## 2025-01-28 PROCEDURE — G2211 COMPLEX E/M VISIT ADD ON: HCPCS | Mod: S$PBB,,, | Performed by: INTERNAL MEDICINE

## 2025-01-28 PROCEDURE — 99215 OFFICE O/P EST HI 40 MIN: CPT | Mod: S$PBB,,, | Performed by: INTERNAL MEDICINE

## 2025-01-28 PROCEDURE — 76705 ECHO EXAM OF ABDOMEN: CPT | Mod: 26,,, | Performed by: RADIOLOGY

## 2025-01-28 PROCEDURE — 99999 PR PBB SHADOW E&M-EST. PATIENT-LVL IV: CPT | Mod: PBBFAC,,, | Performed by: INTERNAL MEDICINE

## 2025-01-28 PROCEDURE — 76705 ECHO EXAM OF ABDOMEN: CPT | Mod: TC,PO

## 2025-01-28 PROCEDURE — 99214 OFFICE O/P EST MOD 30 MIN: CPT | Mod: PBBFAC,25,PN | Performed by: INTERNAL MEDICINE

## 2025-01-28 RX ORDER — DOXYCYCLINE 100 MG/1
100 CAPSULE ORAL EVERY 12 HOURS
Qty: 14 CAPSULE | Refills: 0 | Status: SHIPPED | OUTPATIENT
Start: 2025-01-28 | End: 2025-02-04

## 2025-01-28 NOTE — PROGRESS NOTES
PATIENT: Fred Benjamin  MRN: [id number]  DATE: 1/28/2025      Diagnosis:   1. Malignant neoplasm of pancreas, unspecified location of malignancy    2. Cutaneous abscess of abdominal wall    3. Immunosuppression due to drug therapy    4. Pulmonary nodules    5. Paroxysmal atrial fibrillation    6. Essential hypertension    7. Normocytic anemia              Chief Complaint: Pancreatic adenocarcinoma (3 week follow up )      Oncologic History:      Oncologic History     Oncologic Treatment     Pathology           Subjective:    Interval History:  Mr. Benjamin is a 69 y.o. male with Arthritis, A-fib, GERD, HTN, BHUMIKA who presents for pancreatic cancer.  Since the last clinic visit the patient was admitted from 1/16/25-1/20/25 for N/V, diarrhea and [place].  Diarrhea felt to be due to chemo.  PT states he has a nodule on his right lower abdomen which is draining.  The patient denies CP, cough, SOB, abdominal pain, nausea, vomiting, constipation, diarrhea.  The patient denies fever, chills, night sweats, weight loss, new lumps or bumps, easy bruising or bleeding.    Prior History:   Pt initially underwetn US abdomen 8/02/24 for work up of abdominal pain.  US showed mild intrahepatic ductal dilatation.  PT underwent EUS on 9/13/24 showing for stones in the common bile duct; mass in the pancreatic tail which was biopsied; cystic lesion in the pancreatic body.  Biopsy returned positive for adenocarcinoma.  The patient was discussed at tumor board on 09/18/2024 with recommendation for referral to Surgical Oncology and Medical Oncology.  The patient underwent CT chest, abdomen, and pelvis on 09/1924 showing a small pleural plaque of platelike atelectasis anteriorly in the right hemithorax; intra and extrahepatic biliary duct dilatation; faint density within the common duct which could represent faintly calcified stones, sludge, or debris; large amount of pancreatic fat stranding and fluid around the tail of the pancreas.  Patient  underwent ERCP on 09/20/2024 showing dilatation of the entire main bile duct with stone causing obstruction.  Patient underwent ampullary dilatation and biliary sphincterotomy with the biliary tree swept.  Patient met with surgical oncologist Dr. Butler on 09/25/2024 whom recommended 4 cycles chemotherapy followed by restaging and consideration for distal pancreatectomy.  The patient went to [facility] on 10[facility]03[facility]2024 and underwent CT chest, abdomen, and pelvis showing Variable sized pulmonary nodules predominantly within the left lung with a dominant lesion having irregular border measuring 12 mm in the next most prominent nodule measuring 7 mm; mild emphysema; bilobed fluid or 6 appearing lesion in the pancreatic tail; and chronic dilatation of the common bile duct.  The patient met with medical oncologist Dr. Olson on 10/03/2024 whom recommended 12 cycles of FOLFIRINOX and perioperative management.    Past Medical History:   Past Medical History:   Diagnosis Date    Anemia     Arthritis     Cancer 03/2017    Bladder    Cancer related pain     Chronic diastolic CHF (congestive heart failure)     GERD (gastroesophageal reflux disease)     Headache(784.0)     Hypertension     Pacemaker     Pancreatic cancer     chemo    Paroxysmal atrial fibrillation     Prediabetes     Rash     Sleep apnea     no cpap       Past Surgical HIstory:   Past Surgical History:   Procedure Laterality Date    ABCESS DRAINAGE  06/01/2018    I&D with irrigation abcess left lower back    ABLATION      ATRIAL ABLATION SURGERY      x 2    BLADDER TUMOR EXCISION  03/14/2017     cysto/Cancer    CARDIAC PACEMAKER PLACEMENT      CARDIOVERSION      carpel tunnel Bilateral     CERVICAL FUSION      x3    CHOLECYSTECTOMY      COLONOSCOPY      ENDOSCOPIC ULTRASOUND OF UPPER GASTROINTESTINAL TRACT Left 09/13/2024    Procedure: ULTRASOUND, UPPER GI TRACT, ENDOSCOPIC;  Surgeon: Gerald Ochoa MD;  Location: [facility];  Service: Endoscopy;   Laterality: Left;    ERCP N/A 09/20/2024    Procedure: ERCP (ENDOSCOPIC RETROGRADE CHOLANGIOPANCREATOGRAPHY);  Surgeon: Mariano Ferreira III, MD;  Location: [facility];  Service: Endoscopy;  Laterality: N/A;    ESOPHAGUS SURGERY      ting and then reversal    FOOT FRACTURE SURGERY      FRACTURE SURGERY      INCISION AND DRAINAGE OF ABSCESS Left 06/01/2018    Procedure: INCISION AND DRAINAGE, ABSCESS - left, lower back;  Surgeon: Irving Edouard MD;  Location: [facility] OR;  Service: Orthopedics;  Laterality: Left;    INSERTION OF TUNNELED CENTRAL VENOUS CATHETER (CVC) WITH SUBCUTANEOUS PORT N/A 10/18/2024    Procedure: [id number];  Surgeon: Irving Alicia MD;  Location: [facility];  Service: General;  Laterality: N/A;    KNEE ARTHROSCOPY W/ MENISCAL REPAIR Left     ROTATOR CUFF REPAIR Left        Family History:   Family History   Problem Relation Name Age of Onset    Coronary artery disease Mother      Diabetes Mother      Cancer Father          Lymphoma       Social History:  reports that he has quit smoking. His smoking use included cigars and cigarettes. He has a 20 pack-year smoking history. He has been exposed to tobacco smoke. He has never used smokeless tobacco. He reports that he does not drink alcohol and does not use drugs.    Allergies:  Review of patient's allergies indicates:   Allergen Reactions    Cleocin [clindamycin hcl] Shortness Of Breath     Constipation    Amiodarone     Amiodarone analogues      Affected thyroid    Dabigatran etexilate     Rythmol [propafenone]      Weight loss/ nausea       Medications:  Current Outpatient Medications   Medication Sig Dispense Refill    amLODIPine (NORVASC) 10 MG tablet Take 10 mg by mouth once daily.      ammonium lactate 12 % Crea Apply 1 Application topically 2 (two) times a day. 385 g 5    ascorbic acid, vitamin C, (VITAMIN C) 500 MG tablet Take 1 tablet (500 mg total) by mouth once daily. 30 tablet 3    diphenoxylate-atropine 2.5-0.025 mg  (LOMOTIL) 2.5-0.025 mg per tablet Take 1 tablet by mouth 4 (four) times daily. for 10 days      ELIQUIS 5 mg Tab Take 5 mg by mouth 2 (two) times daily.       loperamide (IMODIUM) 2 mg capsule Take 2 capsules by mouth after first loose stool, then 1 capsule every 2 hours until diarrhea free for 12 hours. May take 2 capsules by mouth every 4 hours at night. May require more than the package labeling maximum dose of 8 capsules per day. (Patient taking differently: Take 2 mg by mouth as needed for Diarrhea.  1 capsule every 2 hours until diarrhea free for 12 hours. May take 2 capsules by mouth every 4 hours at night. May require more than the package labeling maximum dose of 8 capsules per day.) 30 capsule 11    morphine (MS CONTIN) 60 MG 12 hr tablet Take 60 mg by mouth 2 (two) times daily.      ondansetron (ZOFRAN-ODT) 4 MG TbDL Take 1 tablet (4 mg total) by mouth every 6 (six) hours as needed (for nausea).      oxyCODONE (ROXICODONE) 30 MG Tab Take 30 mg by mouth 4 (four) times daily as needed.      pantoprazole (PROTONIX) 40 MG tablet Take 1 tablet (40 mg total) by mouth once daily. 90 tablet 3    pilocarpine (SALAGEN) 5 MG Tab Take 5 mg by mouth 3 (three) times daily.      potassium chloride SA (K-DUR,KLOR-CON) 20 MEQ tablet Take 1 tablet (20 mEq total) by mouth once daily.      sotaloL (BETAPACE AF) 160 MG Tab Take 160 mg by mouth 2 (two) times daily.      doxycycline (VIBRAMYCIN) 100 MG Cap Take 1 capsule (100 mg total) by mouth every 12 (twelve) hours. for 7 days 14 capsule 0     No current facility-administered medications for this visit.       Review of Systems   Constitutional:  Negative for chills, diaphoresis, fatigue, fever and unexpected weight change.   Respiratory:  Negative for cough and shortness of breath.    Cardiovascular:  Negative for chest pain and palpitations.   Gastrointestinal:  Negative for abdominal pain, constipation, diarrhea, nausea and vomiting.   Skin:  Positive for rash (RLQ).  "Negative for color change.   Neurological:  Negative for headaches.   Hematological:  Negative for adenopathy. Does not bruise/bleed easily.       ECOG Performance Status: 1   Objective:      Vitals:   Vitals:    01/28/25 1253   BP: 116/84   BP Location: Right arm   Patient Position: Sitting   Pulse: 91   Resp: 14   Temp: 97.8 °F (36.6 °C)   TempSrc: Temporal   SpO2: 95%   Weight: 86 kg (189 lb 9.5 oz)   Height: 5' 11" (1.803 m)             Physical Exam  Constitutional:       General: He is not in acute distress.     Appearance: He is well-developed. He is not diaphoretic.   HENT:      Head: Normocephalic and atraumatic.   Cardiovascular:      Rate and Rhythm: Normal rate and regular rhythm.      Heart sounds: Normal heart sounds. No murmur heard.     No friction rub. No gallop.   Pulmonary:      Effort: Pulmonary effort is normal. No respiratory distress.      Breath sounds: Normal breath sounds. No wheezing or rales.   Chest:      Chest wall: No tenderness.   Abdominal:      General: Bowel sounds are normal. There is no distension.      Palpations: Abdomen is soft. There is no mass.      Tenderness: There is abdominal tenderness (near rash). There is no rebound.   Musculoskeletal:      Cervical back: Normal range of motion.      Comments: Pain on palpation of left greater trochanter    Lymphadenopathy:      Cervical: No cervical adenopathy.      Upper Body:      Right upper body: No supraclavicular or axillary adenopathy.      Left upper body: No supraclavicular or axillary adenopathy.   Skin:     General: Skin is warm and dry.      Findings: Rash (PT with fluctuant and red nodule RLQ of abdomen) present. No erythema.   Neurological:      Mental Status: He is alert and oriented to person, place, and time.   Psychiatric:         Behavior: Behavior normal.         Laboratory Data:  Lab Visit on 01/28/2025   Component Date Value Ref Range Status    WBC 01/28/2025 9.39  3.90 - 12.70 K/uL Final    RBC 01/28/2025 3.90 " (L)  4.60 - 6.20 M/uL Final    Hemoglobin 01/28/2025 10.1 (L)  14.0 - 18.0 g/dL Final    Hematocrit 01/28/2025 32.5 (L)  40.0 - 54.0 % Final    MCV 01/28/2025 83  82 - 98 fL Final    MCH 01/28/2025 25.9 (L)  27.0 - 31.0 pg Final    MCHC 01/28/2025 31.1 (L)  32.0 - 36.0 g/dL Final    RDW 01/28/2025 19.3 (H)  11.5 - 14.5 % Final    Platelets 01/28/2025 246  150 - 450 K/uL Final    MPV 01/28/2025 10.3  9.2 - 12.9 fL Final    Immature Granulocytes 01/28/2025 0.5  0.0 - 0.5 % Final    Gran # (ANC) 01/28/2025 7.2  1.8 - 7.7 K/uL Final    Immature Grans (Abs) 01/28/2025 0.05 (H)  0.00 - 0.04 K/uL Final    Comment: Mild elevation in immature granulocytes is non specific and   can be seen in a variety of conditions including stress response,   acute inflammation, trauma and pregnancy. Correlation with other   laboratory and clinical findings is essential.      Lymph # 01/28/2025 1.4  1.0 - 4.8 K/uL Final    Mono # 01/28/2025 0.6  0.3 - 1.0 K/uL Final    Eos # 01/28/2025 0.1  0.0 - 0.5 K/uL Final    Baso # 01/28/2025 0.08  0.00 - 0.20 K/uL Final    nRBC 01/28/2025 0  0 /100 WBC Final    Gran % 01/28/2025 76.5 (H)  38.0 - 73.0 % Final    Lymph % 01/28/2025 14.6 (L)  18.0 - 48.0 % Final    Mono % 01/28/2025 6.5  4.0 - 15.0 % Final    Eosinophil % 01/28/2025 1.0  0.0 - 8.0 % Final    Basophil % 01/28/2025 0.9  0.0 - 1.9 % Final    Differential Method 01/28/2025 Automated   Final    Sodium 01/28/2025 141  136 - 145 mmol/L Final    Potassium 01/28/2025 4.5  3.5 - 5.1 mmol/L Final    Chloride 01/28/2025 108  95 - 110 mmol/L Final    CO2 01/28/2025 25  23 - 29 mmol/L Final    Glucose 01/28/2025 128 (H)  70 - 110 mg/dL Final    BUN 01/28/2025 9  8 - 23 mg/dL Final    Creatinine 01/28/2025 0.9  0.5 - 1.4 mg/dL Final    Calcium 01/28/2025 7.8 (L)  8.7 - 10.5 mg/dL Final    Total Protein 01/28/2025 5.7 (L)  6.0 - 8.4 g/dL Final    Albumin 01/28/2025 2.3 (L)  3.5 - 5.2 g/dL Final    Total Bilirubin 01/28/2025 0.3  0.1 - 1.0 mg/dL Final     Comment: For infants and newborns, interpretation of results should be based  on gestational age, weight and in agreement with clinical  observations.    Premature Infant recommended reference ranges:  Up to 24 hours.............<8.0 mg/dL  Up to 48 hours............<12.0 mg/dL  3-5 days..................<15.0 mg/dL  6-29 days.................<15.0 mg/dL      Alkaline Phosphatase 01/28/2025 115  40 - 150 U/L Final    AST 01/28/2025 18  10 - 40 U/L Final    ALT 01/28/2025 10  10 - 44 U/L Final    eGFR 01/28/2025 >60.0  >60 mL/min/1.73 m^2 Final    Anion Gap 01/28/2025 8  8 - 16 mmol/L Final    Magnesium 01/28/2025 1.2 (L)  1.6 - 2.6 mg/dL Final         Imaging:     Reviewed       Assessment:       1. Malignant neoplasm of pancreas, unspecified location of malignancy    2. Cutaneous abscess of abdominal wall    3. Immunosuppression due to drug therapy    4. Pulmonary nodules    5. Paroxysmal atrial fibrillation    6. Essential hypertension    7. Normocytic anemia                 Plan:     Adenocarcinoma of the Pancreas - patient with adenocarcinoma in the tail of the pancreas status post biopsy 09/13/2024  -CT chest, abdomen, and pelvis on 10/03/2024 showed a lesion in the tail of the pancreas as well as multiple pulmonary nodules with 1 nodule measuring at least 12 mm  -Will obtain PET-CT  -Pt seen by Dr Butler in surgical oncology at [facility] 09/25/2024 whom recommended 4 cycles of chemo followed by restaging  -Patient seen by  at [facility] on 10/03/2024 recommending perioperative FOLFIRINOX with elimination of bolus fluorouracil and leucovorin  -Pharmacogenomic showed the patient to have normal UGT1A1 and DPYD genes  -[employer]ae germline testing negative  -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   Nodule in left hip felt to be trochanteric  bursitis  -Will hold cycle 4 currently given concern for abscess in RLQ of abdomen  -Will reduce irinotecan and 5-FU  -Irinotecan reduced to 165mg/mm2 and Fluorouracil by 20%  Visit today included increased complexity associated with the care of the episodic problem (chemotherapy) addressed and managing the longitudinal care of the patient due to the serious and/or complex managed problem(s) pancreatic cancer.    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 - pt at increased risk of infection  -Pt with leukocytosis but no source of infection identified  -Will monitor    Abscess - RLQ of abdomen  -Will obtain US and have pt see general surgery  -Pt to start on Doxycycline 100mg BID for 7 days    Pulmonary Nodules - Seen on Ct chest 10/03/24 with largest nodule in left lung  -PET/CT 10/17/24 showed no concerning nodules  -Will monitor    A-fib - pt on eliquis and sotalol  -Cardiology managing  -Will monitor    HTN - pt on amlodipine, sotalol  -BP ustable  -Will monitor    GERD - pt on protonix  -Will monitor    B12 Deficiency - low on 8/02/24 at 195pg/mL  -Pt on B12 injection monthly  -Will monitor    Anemia  - due to chemo  -Hemoglobin 10.1g/dL on 1/28/25  -Stable  -Will monitor    Route Chart for Scheduling    Med Onc Chart Routing      Follow up with physician 1 week. Pt needs US abdomen asap.  PT needs general surgery appt ASAP after US foir abdominal abscess.  Pt needs a CBC, CMP, Ca19-9 on 2/03/24 with an appt with me.  Treatmetn next week needs to be moved to 2/04 or 2/05.   Follow up with KB    Infusion scheduling note    Injection scheduling note    Labs    Imaging    Pharmacy appointment    Other referrals              Treatment Plan Information   OP GI FOLFIRINOX (oxaliplatin, leucovorin, irinotecan, fluorouracil) Q2W  Stephan Suero MD   Associated diagnosis: Pancreatic cancer Stage IA cT1c, cN0, cM0 noted on 10/11/2024   Line of treatment: Neoadjuvant  Treatment Goal: Curative     Upcoming Treatment Dates - OP GI  FOLFIRINOX (oxaliplatin, leucovorin, irinotecan, fluorouracil) Q2W     1/28/2025       Chemotherapy       oxaliplatin (ELOXATIN) 85 mg/m2 = 178 mg in D5W 535.6 mL chemo infusion       leucovorin calcium 360 mg/m2 = 750 mg in D5W 250 mL infusion       irinotecan (CAMPTOSAR) 165 mg/m2 = 344 mg in 0.9% NaCl 517.2 mL chemo infusion       fluorouraciL injection 750 mg       fluorouracil (Adrucil) 2,000 mg/m2 = 4,180 mg in 0.9% NaCl 100 mL chemo infusion       Antiemetics       palonosetron 0.25mg/dexAMETHasone 12mg in NS IVPB 0.25 mg 50 mL  2/11/2025       Chemotherapy       oxaliplatin (ELOXATIN) 85 mg/m2 = 178 mg in D5W 535.6 mL chemo infusion       leucovorin calcium 360 mg/m2 = 750 mg in D5W 250 mL infusion       irinotecan (CAMPTOSAR) 165 mg/m2 = 344 mg in 0.9% NaCl 517.2 mL chemo infusion       fluorouraciL injection 750 mg       fluorouracil (Adrucil) 2,000 mg/m2 = 4,180 mg in 0.9% NaCl 100 mL chemo infusion       Antiemetics       palonosetron 0.25mg/dexAMETHasone 12mg in NS IVPB 0.25 mg 50 mL  2/25/2025       Chemotherapy       oxaliplatin (ELOXATIN) 85 mg/m2 = 178 mg in D5W 535.6 mL chemo infusion       leucovorin calcium 360 mg/m2 = 750 mg in D5W 250 mL infusion       irinotecan (CAMPTOSAR) 165 mg/m2 = 344 mg in 0.9% NaCl 517.2 mL chemo infusion       fluorouraciL injection 750 mg       fluorouracil (Adrucil) 2,000 mg/m2 = 4,180 mg in 0.9% NaCl 100 mL chemo infusion       Antiemetics       palonosetron 0.25mg/dexAMETHasone 12mg in NS IVPB 0.25 mg 50 mL  3/11/2025       Chemotherapy       oxaliplatin (ELOXATIN) 85 mg/m2 = 178 mg in D5W 535.6 mL chemo infusion       leucovorin calcium 360 mg/m2 = 750 mg in D5W 250 mL infusion       irinotecan (CAMPTOSAR) 165 mg/m2 = 344 mg in 0.9% NaCl 517.2 mL chemo infusion       fluorouraciL injection 750 mg       fluorouracil (Adrucil) 2,000 mg/m2 = 4,180 mg in 0.9% NaCl 100 mL chemo infusion       Antiemetics       palonosetron 0.25mg/dexAMETHasone 12mg in NS IVPB 0.25 mg  50 mL    Supportive Plan Information  IV FLUIDS AND ELECTROLYTES Truman Gr NP   Associated Diagnosis: Dehydration   noted on 11/5/2024  Associated Diagnosis: Chemotherapy induced diarrhea   noted on 11/1/2024  Associated Diagnosis: Diarrhea   noted on 11/19/2024   Line of treatment: Supportive Care   Treatment goal: Supportive     Upcoming Treatment Dates - IV FLUIDS AND ELECTROLYTES    No upcoming days in selected categories.      Stephan Suero MD  [facility]  Hematology and Oncology  [facility] [address] [place] [address]   O: [phone]  F: [phone]

## 2025-01-30 DIAGNOSIS — C25.9 MALIGNANT NEOPLASM OF PANCREAS, UNSPECIFIED LOCATION OF MALIGNANCY: Primary | ICD-10-CM

## 2025-02-02 NOTE — PROGRESS NOTES
PATIENT: Fred Benjamin  MRN: [id number]  DATE: 2/3/2025      Diagnosis:   1. Malignant neoplasm of pancreas, unspecified location of malignancy    2. Cutaneous abscess of abdominal wall    3. Immunosuppression due to drug therapy    4. Pulmonary nodules    5. Paroxysmal atrial fibrillation    6. Essential hypertension    7. Normocytic anemia                Chief Complaint: Malignant neoplasm of pancreas, unspecified location of mal (1 week follow up )      Oncologic History:      Oncologic History     Oncologic Treatment     Pathology           Subjective:    Interval History:  Mr. Benjamin is a 69 y.o. male with Arthritis, A-fib, GERD, HTN, BHUMIKA who presents for pancreatic cancer.  Since the last clinic visit the patient underwent US abdomen 1/28/25 showing an abscess in the RLQ of the abdomen.  Pt saw Dr Alicia on 1/31/25 and underwent I&D of right abdominal abscess.  Pt states the swelling has improved and he is having less pain.  The patient denies CP, cough, SOB, abdominal pain, nausea, vomiting, constipation, diarrhea.  The patient denies fever, chills, night sweats, weight loss, new lumps or bumps, easy bruising or bleeding.    Prior History:   Pt initially underwetn US abdomen 8/02/24 for work up of abdominal pain.  US showed mild intrahepatic ductal dilatation.  PT underwent EUS on 9/13/24 showing for stones in the common bile duct; mass in the pancreatic tail which was biopsied; cystic lesion in the pancreatic body.  Biopsy returned positive for adenocarcinoma.  The patient was discussed at tumor board on 09/18/2024 with recommendation for referral to Surgical Oncology and Medical Oncology.  The patient underwent CT chest, abdomen, and pelvis on 09/1924 showing a small pleural plaque of platelike atelectasis anteriorly in the right hemithorax; intra and extrahepatic biliary duct dilatation; faint density within the common duct which could represent faintly calcified stones, sludge, or debris; large amount  of pancreatic fat stranding and fluid around the tail of the pancreas.  Patient underwent ERCP on 09/20/2024 showing dilatation of the entire main bile duct with stone causing obstruction.  Patient underwent ampullary dilatation and biliary sphincterotomy with the biliary tree swept.  Patient met with surgical oncologist Dr. Butler on 09/25/2024 whom recommended 4 cycles chemotherapy followed by restaging and consideration for distal pancreatectomy.  The patient went to [facility] on 10/03/2024 and underwent CT chest, abdomen, and pelvis showing Variable sized pulmonary nodules predominantly within the left lung with a dominant lesion having irregular border measuring 12 mm in the next most prominent nodule measuring 7 mm; mild emphysema; bilobed fluid or 6 appearing lesion in the pancreatic tail; and chronic dilatation of the common bile duct.  The patient met with medical oncologist Dr. Olson on 10/03/2024 whom recommended 12 cycles of FOLFIRINOX and perioperative management.    Past Medical History:   Past Medical History:   Diagnosis Date    Anemia     Arthritis     Cancer 03/2017    Bladder    Cancer related pain     Chronic diastolic CHF (congestive heart failure)     GERD (gastroesophageal reflux disease)     Headache(784.0)     Hypertension     Pacemaker     Pancreatic cancer     chemo    Paroxysmal atrial fibrillation     Prediabetes     Rash     Sleep apnea     no cpap       Past Surgical HIstory:   Past Surgical History:   Procedure Laterality Date    ABCESS DRAINAGE  06/01/2018    I&D with irrigation abcess left lower back    ABLATION      ATRIAL ABLATION SURGERY      x 2    BLADDER TUMOR EXCISION  03/14/2017     cysto/Cancer    CARDIAC PACEMAKER PLACEMENT      CARDIOVERSION      carpel tunnel Bilateral     CERVICAL FUSION      x3    CHOLECYSTECTOMY      COLONOSCOPY      ENDOSCOPIC ULTRASOUND OF UPPER GASTROINTESTINAL TRACT Left 09/13/2024    Procedure: ULTRASOUND, UPPER GI TRACT, ENDOSCOPIC;  Surgeon:  Gerald Ochoa MD;  Location: [facility] ENDO;  Service: Endoscopy;  Laterality: Left;    ERCP N/A 09/20/2024    Procedure: ERCP (ENDOSCOPIC RETROGRADE CHOLANGIOPANCREATOGRAPHY);  Surgeon: Mariano Ferreira III, MD;  Location: [facility] ENDO;  Service: Endoscopy;  Laterality: N/A;    ESOPHAGUS SURGERY      ting and then reversal    FOOT FRACTURE SURGERY      FRACTURE SURGERY      INCISION AND DRAINAGE OF ABSCESS Left 06/01/2018    Procedure: INCISION AND DRAINAGE, ABSCESS - left, lower back;  Surgeon: Irving Edouard MD;  Location: [facility] OR;  Service: Orthopedics;  Laterality: Left;    INSERTION OF TUNNELED CENTRAL VENOUS CATHETER (CVC) WITH SUBCUTANEOUS PORT N/A 10/18/2024    Procedure: [id number];  Surgeon: Irving Alicia MD;  Location: [facility] OR;  Service: General;  Laterality: N/A;    KNEE ARTHROSCOPY W/ MENISCAL REPAIR Left     ROTATOR CUFF REPAIR Left        Family History:   Family History   Problem Relation Name Age of Onset    Coronary artery disease Mother      Diabetes Mother      Cancer Father          Lymphoma       Social History:  reports that he has quit smoking. His smoking use included cigars and cigarettes. He has a 20 pack-year smoking history. He has been exposed to tobacco smoke. He has never used smokeless tobacco. He reports that he does not drink alcohol and does not use drugs.    Allergies:  Review of patient's allergies indicates:   Allergen Reactions    Cleocin [clindamycin hcl] Shortness Of Breath     Constipation    Amiodarone     Amiodarone analogues      Affected thyroid    Dabigatran etexilate     Rythmol [propafenone]      Weight loss/ nausea       Medications:  Current Outpatient Medications   Medication Sig Dispense Refill    amLODIPine (NORVASC) 10 MG tablet Take 10 mg by mouth once daily.      ammonium lactate 12 % Crea Apply 1 Application topically 2 (two) times a day. 385 g 5    ascorbic acid, vitamin C, (VITAMIN C) 500 MG tablet Take 1 tablet (500 mg total) by mouth  once daily. 30 tablet 3    doxycycline (VIBRAMYCIN) 100 MG Cap Take 1 capsule (100 mg total) by mouth every 12 (twelve) hours. for 7 days 14 capsule 0    ELIQUIS 5 mg Tab Take 5 mg by mouth 2 (two) times daily.       loperamide (IMODIUM) 2 mg capsule Take 2 capsules by mouth after first loose stool, then 1 capsule every 2 hours until diarrhea free for 12 hours. May take 2 capsules by mouth every 4 hours at night. May require more than the package labeling maximum dose of 8 capsules per day. (Patient taking differently: Take 2 mg by mouth as needed for Diarrhea.  1 capsule every 2 hours until diarrhea free for 12 hours. May take 2 capsules by mouth every 4 hours at night. May require more than the package labeling maximum dose of 8 capsules per day.) 30 capsule 11    morphine (MS CONTIN) 60 MG 12 hr tablet Take 60 mg by mouth 2 (two) times daily.      ondansetron (ZOFRAN-ODT) 4 MG TbDL Take 1 tablet (4 mg total) by mouth every 6 (six) hours as needed (for nausea).      oxyCODONE (ROXICODONE) 30 MG Tab Take 30 mg by mouth 4 (four) times daily as needed.      pantoprazole (PROTONIX) 40 MG tablet Take 1 tablet (40 mg total) by mouth once daily. 90 tablet 3    pilocarpine (SALAGEN) 5 MG Tab Take 5 mg by mouth 3 (three) times daily.      potassium chloride SA (K-DUR,KLOR-CON) 20 MEQ tablet Take 1 tablet (20 mEq total) by mouth once daily.      sotaloL (BETAPACE AF) 160 MG Tab Take 160 mg by mouth 2 (two) times daily.       No current facility-administered medications for this visit.       Review of Systems   Constitutional:  Negative for chills, diaphoresis, fatigue, fever and unexpected weight change.   Respiratory:  Negative for cough and shortness of breath.    Cardiovascular:  Negative for chest pain and palpitations.   Gastrointestinal:  Positive for abdominal pain (RLQ improving). Negative for constipation, diarrhea, nausea and vomiting.   Skin:  Negative for color change and rash.   Neurological:  Negative for  "headaches.   Hematological:  Negative for adenopathy. Does not bruise/bleed easily.       ECOG Performance Status: 1   Objective:      Vitals:   Vitals:    02/03/25 0913   BP: 130/88   BP Location: Right arm   Patient Position: Sitting   Pulse: 80   Resp: 18   Temp: 97.2 °F (36.2 °C)   TempSrc: Temporal   SpO2: 97%   Weight: 84.3 kg (185 lb 13.6 oz)   Height: 5' 11" (1.803 m)               Physical Exam  Constitutional:       General: He is not in acute distress.     Appearance: He is well-developed. He is not diaphoretic.   HENT:      Head: Normocephalic and atraumatic.   Cardiovascular:      Rate and Rhythm: Normal rate and regular rhythm.      Heart sounds: Normal heart sounds. No murmur heard.     No friction rub. No gallop.   Pulmonary:      Effort: Pulmonary effort is normal. No respiratory distress.      Breath sounds: Normal breath sounds. No wheezing or rales.   Chest:      Chest wall: No tenderness.   Abdominal:      General: Bowel sounds are normal. There is no distension.      Palpations: Abdomen is soft. There is no mass.      Tenderness: There is no abdominal tenderness. There is no rebound.   Musculoskeletal:      Cervical back: Normal range of motion.      Comments: Pain on palpation of left greater trochanter    Lymphadenopathy:      Cervical: No cervical adenopathy.      Upper Body:      Right upper body: No supraclavicular or axillary adenopathy.      Left upper body: No supraclavicular or axillary adenopathy.   Skin:     General: Skin is warm and dry.      Findings: Rash (Previous rash improved with small incisiopn from I&D C/D/I) present. No erythema.   Neurological:      Mental Status: He is alert and oriented to person, place, and time.   Psychiatric:         Behavior: Behavior normal.         Laboratory Data:  Lab Visit on 02/03/2025   Component Date Value Ref Range Status    WBC 02/03/2025 8.07  3.90 - 12.70 K/uL Final    RBC 02/03/2025 4.03 (L)  4.60 - 6.20 M/uL Final    Hemoglobin " 02/03/2025 10.6 (L)  14.0 - 18.0 g/dL Final    Hematocrit 02/03/2025 33.7 (L)  40.0 - 54.0 % Final    MCV 02/03/2025 84  82 - 98 fL Final    MCH 02/03/2025 26.3 (L)  27.0 - 31.0 pg Final    MCHC 02/03/2025 31.5 (L)  32.0 - 36.0 g/dL Final    RDW 02/03/2025 19.4 (H)  11.5 - 14.5 % Final    Platelets 02/03/2025 339  150 - 450 K/uL Final    MPV 02/03/2025 9.5  9.2 - 12.9 fL Final    Immature Granulocytes 02/03/2025 0.5  0.0 - 0.5 % Final    Gran # (ANC) 02/03/2025 5.2  1.8 - 7.7 K/uL Final    Immature Grans (Abs) 02/03/2025 0.04  0.00 - 0.04 K/uL Final    Comment: Mild elevation in immature granulocytes is non specific and   can be seen in a variety of conditions including stress response,   acute inflammation, trauma and pregnancy. Correlation with other   laboratory and clinical findings is essential.      Lymph # 02/03/2025 1.8  1.0 - 4.8 K/uL Final    Mono # 02/03/2025 0.8  0.3 - 1.0 K/uL Final    Eos # 02/03/2025 0.2  0.0 - 0.5 K/uL Final    Baso # 02/03/2025 0.09  0.00 - 0.20 K/uL Final    nRBC 02/03/2025 0  0 /100 WBC Final    Gran % 02/03/2025 64.6  38.0 - 73.0 % Final    Lymph % 02/03/2025 22.1  18.0 - 48.0 % Final    Mono % 02/03/2025 9.3  4.0 - 15.0 % Final    Eosinophil % 02/03/2025 2.4  0.0 - 8.0 % Final    Basophil % 02/03/2025 1.1  0.0 - 1.9 % Final    Platelet Estimate 02/03/2025 Appears normal   Final    Aniso 02/03/2025 Slight   Final    Poik 02/03/2025 Slight   Final    Ovalocytes 02/03/2025 Occasional   Final    Differential Method 02/03/2025 Automated   Final    Sodium 02/03/2025 140  136 - 145 mmol/L Final    Potassium 02/03/2025 4.6  3.5 - 5.1 mmol/L Final    Chloride 02/03/2025 106  95 - 110 mmol/L Final    CO2 02/03/2025 26  23 - 29 mmol/L Final    Glucose 02/03/2025 88  70 - 110 mg/dL Final    BUN 02/03/2025 8  8 - 23 mg/dL Final    Creatinine 02/03/2025 0.9  0.5 - 1.4 mg/dL Final    Calcium 02/03/2025 8.3 (L)  8.7 - 10.5 mg/dL Final    Total Protein 02/03/2025 6.4  6.0 - 8.4 g/dL Final     Albumin 02/03/2025 2.7 (L)  3.5 - 5.2 g/dL Final    Total Bilirubin 02/03/2025 0.3  0.1 - 1.0 mg/dL Final    Comment: For infants and newborns, interpretation of results should be based  on gestational age, weight and in agreement with clinical  observations.    Premature Infant recommended reference ranges:  Up to 24 hours.............<8.0 mg/dL  Up to 48 hours............<12.0 mg/dL  3-5 days..................<15.0 mg/dL  6-29 days.................<15.0 mg/dL      Alkaline Phosphatase 02/03/2025 119  40 - 150 U/L Final    AST 02/03/2025 17  10 - 40 U/L Final    ALT 02/03/2025 11  10 - 44 U/L Final    eGFR 02/03/2025 >60.0  >60 mL/min/1.73 m^2 Final    Anion Gap 02/03/2025 8  8 - 16 mmol/L Final   Lab Visit on 01/28/2025   Component Date Value Ref Range Status    WBC 01/28/2025 9.39  3.90 - 12.70 K/uL Final    RBC 01/28/2025 3.90 (L)  4.60 - 6.20 M/uL Final    Hemoglobin 01/28/2025 10.1 (L)  14.0 - 18.0 g/dL Final    Hematocrit 01/28/2025 32.5 (L)  40.0 - 54.0 % Final    MCV 01/28/2025 83  82 - 98 fL Final    MCH 01/28/2025 25.9 (L)  27.0 - 31.0 pg Final    MCHC 01/28/2025 31.1 (L)  32.0 - 36.0 g/dL Final    RDW 01/28/2025 19.3 (H)  11.5 - 14.5 % Final    Platelets 01/28/2025 246  150 - 450 K/uL Final    MPV 01/28/2025 10.3  9.2 - 12.9 fL Final    Immature Granulocytes 01/28/2025 0.5  0.0 - 0.5 % Final    Gran # (ANC) 01/28/2025 7.2  1.8 - 7.7 K/uL Final    Immature Grans (Abs) 01/28/2025 0.05 (H)  0.00 - 0.04 K/uL Final    Comment: Mild elevation in immature granulocytes is non specific and   can be seen in a variety of conditions including stress response,   acute inflammation, trauma and pregnancy. Correlation with other   laboratory and clinical findings is essential.      Lymph # 01/28/2025 1.4  1.0 - 4.8 K/uL Final    Mono # 01/28/2025 0.6  0.3 - 1.0 K/uL Final    Eos # 01/28/2025 0.1  0.0 - 0.5 K/uL Final    Baso # 01/28/2025 0.08  0.00 - 0.20 K/uL Final    nRBC 01/28/2025 0  0 /100 WBC Final    Gran %  01/28/2025 76.5 (H)  38.0 - 73.0 % Final    Lymph % 01/28/2025 14.6 (L)  18.0 - 48.0 % Final    Mono % 01/28/2025 6.5  4.0 - 15.0 % Final    Eosinophil % 01/28/2025 1.0  0.0 - 8.0 % Final    Basophil % 01/28/2025 0.9  0.0 - 1.9 % Final    Differential Method 01/28/2025 Automated   Final    Sodium 01/28/2025 141  136 - 145 mmol/L Final    Potassium 01/28/2025 4.5  3.5 - 5.1 mmol/L Final    Chloride 01/28/2025 108  95 - 110 mmol/L Final    CO2 01/28/2025 25  23 - 29 mmol/L Final    Glucose 01/28/2025 128 (H)  70 - 110 mg/dL Final    BUN 01/28/2025 9  8 - 23 mg/dL Final    Creatinine 01/28/2025 0.9  0.5 - 1.4 mg/dL Final    Calcium 01/28/2025 7.8 (L)  8.7 - 10.5 mg/dL Final    Total Protein 01/28/2025 5.7 (L)  6.0 - 8.4 g/dL Final    Albumin 01/28/2025 2.3 (L)  3.5 - 5.2 g/dL Final    Total Bilirubin 01/28/2025 0.3  0.1 - 1.0 mg/dL Final    Comment: For infants and newborns, interpretation of results should be based  on gestational age, weight and in agreement with clinical  observations.    Premature Infant recommended reference ranges:  Up to 24 hours.............<8.0 mg/dL  Up to 48 hours............<12.0 mg/dL  3-5 days..................<15.0 mg/dL  6-29 days.................<15.0 mg/dL      Alkaline Phosphatase 01/28/2025 115  40 - 150 U/L Final    AST 01/28/2025 18  10 - 40 U/L Final    ALT 01/28/2025 10  10 - 44 U/L Final    eGFR 01/28/2025 >60.0  >60 mL/min/1.73 m^2 Final    Anion Gap 01/28/2025 8  8 - 16 mmol/L Final    Magnesium 01/28/2025 1.2 (L)  1.6 - 2.6 mg/dL Final         Imaging:     US abdomen 1/28/25  Images labeled right lower quadrant abdomen palpable area show skin thickening and edema. There is an irregular, ill-defined complicated fluid collection extending to the skin measuring approximately 7 x 6 x 2 mm. Subcutaneous edema.       Assessment:       1. Malignant neoplasm of pancreas, unspecified location of malignancy    2. Cutaneous abscess of abdominal wall    3. Immunosuppression due to drug  therapy    4. Pulmonary nodules    5. Paroxysmal atrial fibrillation    6. Essential hypertension    7. Normocytic anemia                   Plan:     Adenocarcinoma of the Pancreas - patient with adenocarcinoma in the tail of the pancreas status post biopsy 09/13/2024  -CT chest, abdomen, and pelvis on 10/03/2024 showed a lesion in the tail of the pancreas as well as multiple pulmonary nodules with 1 nodule measuring at least 12 mm  -Will obtain PET-CT  -Pt seen by Dr Butler in surgical oncology at [facility] 09/25/2024 whom recommended 4 cycles of chemo followed by restaging  -Patient seen by  at [facility] on 10/03/2024 recommending perioperative FOLFIRINOX with elimination of bolus fluorouracil and leucovorin  -Pharmacogenomic showed the patient to have normal UGT1A1 and DPYD genes  -Invitae germline testing negative  -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   Nodule in left hip felt to be trochanteric bursitis  -Irinotecan reduced to 165mg/mm2 and Fluorouracil by 20% due to prior diarrhea and side effects  -Will proceed with cycle 4 of FOLFIRINOX  -Will repeat CT C/A/P with appt with me and Dr Butler once completed  Visit today included increased complexity associated with the care of the episodic problem (chemotherapy) addressed and managing the longitudinal care of the patient due to the serious and/or complex managed problem(s) pancreatic cancer.    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 - pt at increased risk of infection  -No current signs of infection  -Will monitor    Abscess - pt s/p I&D 1/31/25  -PT to complete week of doxycycline    Pulmonary Nodules - Seen on Ct chest 10/03/24 with largest nodule in left lung  -PET/CT 10/17/24 showed no concerning nodules  -Will monitor    A-fib - pt on eliquis and sotalol  -Cardiology managing  -Will monitor    HTN - pt on amlodipine, sotalol  -BP  ustable  -Will monitor    GERD - pt on protonix  -Will monitor    B12 Deficiency - low on 8/02/24 at 195pg/mL  -Pt on B12 injection monthly  -Will monitor    Anemia  - due to chemo  -Hemoglobin 10.6g/dL on 2/03/25  -Stable  -Will monitor    Route Chart for Scheduling    Med Onc Chart Routing      Follow up with physician 1 week. Pt can proceed with treatment this week.  Pt needs CT C/A/P early next week with an appt with me and Dr Butler just after the scans.   Follow up with KB    Infusion scheduling note    Injection scheduling note    Labs    Imaging    Pharmacy appointment    Other referrals              Treatment Plan Information   OP GI FOLFIRINOX (oxaliplatin, leucovorin, irinotecan, fluorouracil) Q2W  Stephan Suero MD   Associated diagnosis: Pancreatic cancer Stage IA cT1c, cN0, cM0 noted on 10/11/2024   Line of treatment: Neoadjuvant  Treatment Goal: Curative     Upcoming Treatment Dates - OP GI FOLFIRINOX (oxaliplatin, leucovorin, irinotecan, fluorouracil) Q2W     2/4/2025       Chemotherapy       oxaliplatin (ELOXATIN) 85 mg/m2 = 174 mg in D5W 534.8 mL chemo infusion       leucovorin calcium 360 mg/m2 = 740 mg in D5W 250 mL infusion       irinotecan (CAMPTOSAR) 165 mg/m2 = 338 mg in 0.9% NaCl 516.9 mL chemo infusion       fluorouraciL injection 740 mg       fluorouracil (Adrucil) 2,000 mg/m2 = 4,100 mg in 0.9% NaCl 100 mL chemo infusion       Antiemetics       palonosetron 0.25mg/dexAMETHasone 12mg in NS IVPB 0.25 mg 50 mL  2/18/2025       Chemotherapy       oxaliplatin (ELOXATIN) in D5W 534.8 mL chemo infusion       leucovorin calcium in D5W 250 mL infusion       irinotecan (CAMPTOSAR) in 0.9% NaCl 516.9 mL chemo infusion       fluorouraciL injection       fluorouracil chemo infusion       Antiemetics       palonosetron 0.25mg/dexAMETHasone 12mg in NS IVPB 0.25 mg 50 mL  3/4/2025       Chemotherapy       oxaliplatin (ELOXATIN) in D5W 534.8 mL chemo infusion       leucovorin calcium in D5W 250 mL  infusion       irinotecan (CAMPTOSAR) in 0.9% NaCl 516.9 mL chemo infusion       fluorouraciL injection       fluorouracil chemo infusion       Antiemetics       palonosetron 0.25mg/dexAMETHasone 12mg in NS IVPB 0.25 mg 50 mL  3/18/2025       Chemotherapy       oxaliplatin (ELOXATIN) in D5W 534.8 mL chemo infusion       leucovorin calcium in D5W 250 mL infusion       irinotecan (CAMPTOSAR) in 0.9% NaCl 516.9 mL chemo infusion       fluorouraciL injection       fluorouracil chemo infusion       Antiemetics       palonosetron 0.25mg/dexAMETHasone 12mg in NS IVPB 0.25 mg 50 mL    Supportive Plan Information  IV FLUIDS AND ELECTROLYTES Truman Gr NP   Associated Diagnosis: Dehydration   noted on 11/5/2024  Associated Diagnosis: Chemotherapy induced diarrhea   noted on 11/1/2024  Associated Diagnosis: Diarrhea   noted on 11/19/2024   Line of treatment: Supportive Care   Treatment goal: Supportive     Upcoming Treatment Dates - IV FLUIDS AND ELECTROLYTES    No upcoming days in selected categories.      Stephan Suero MD  [facility]  Hematology and Oncology  [facility]   [address] [facility] [address] [facility] [place] [address]   O: [phone]  F: [phone]

## 2025-02-03 ENCOUNTER — OFFICE VISIT (OUTPATIENT)
Dept: HEMATOLOGY/ONCOLOGY | Facility: CLINIC | Age: 70
End: 2025-02-03
Payer: MEDICARE

## 2025-02-03 ENCOUNTER — TELEPHONE (OUTPATIENT)
Dept: HEMATOLOGY/ONCOLOGY | Facility: CLINIC | Age: 70
End: 2025-02-03
Payer: MEDICARE

## 2025-02-03 ENCOUNTER — LAB VISIT (OUTPATIENT)
Dept: LAB | Facility: HOSPITAL | Age: 70
End: 2025-02-03
Attending: INTERNAL MEDICINE
Payer: MEDICARE

## 2025-02-03 VITALS
TEMPERATURE: 97 F | SYSTOLIC BLOOD PRESSURE: 130 MMHG | HEIGHT: 71 IN | DIASTOLIC BLOOD PRESSURE: 88 MMHG | BODY MASS INDEX: 26.02 KG/M2 | WEIGHT: 185.88 LBS | OXYGEN SATURATION: 97 % | HEART RATE: 80 BPM | RESPIRATION RATE: 18 BRPM

## 2025-02-03 DIAGNOSIS — R91.8 PULMONARY NODULES: ICD-10-CM

## 2025-02-03 DIAGNOSIS — C25.9 MALIGNANT NEOPLASM OF PANCREAS, UNSPECIFIED LOCATION OF MALIGNANCY: Primary | ICD-10-CM

## 2025-02-03 DIAGNOSIS — I10 ESSENTIAL HYPERTENSION: ICD-10-CM

## 2025-02-03 DIAGNOSIS — L02.211 CUTANEOUS ABSCESS OF ABDOMINAL WALL: ICD-10-CM

## 2025-02-03 DIAGNOSIS — D84.821 IMMUNOSUPPRESSION DUE TO DRUG THERAPY: ICD-10-CM

## 2025-02-03 DIAGNOSIS — C25.9 MALIGNANT NEOPLASM OF PANCREAS, UNSPECIFIED LOCATION OF MALIGNANCY: ICD-10-CM

## 2025-02-03 DIAGNOSIS — C25.9 PANCREATIC ADENOCARCINOMA: ICD-10-CM

## 2025-02-03 DIAGNOSIS — I48.0 PAROXYSMAL ATRIAL FIBRILLATION: ICD-10-CM

## 2025-02-03 DIAGNOSIS — Z79.899 IMMUNOSUPPRESSION DUE TO DRUG THERAPY: ICD-10-CM

## 2025-02-03 DIAGNOSIS — D64.9 NORMOCYTIC ANEMIA: ICD-10-CM

## 2025-02-03 LAB
ALBUMIN SERPL BCP-MCNC: 2.7 G/DL (ref 3.5–5.2)
ALP SERPL-CCNC: 119 U/L (ref 40–150)
ALT SERPL W/O P-5'-P-CCNC: 11 U/L (ref 10–44)
ANION GAP SERPL CALC-SCNC: 8 MMOL/L (ref 8–16)
ANISOCYTOSIS BLD QL SMEAR: SLIGHT
AST SERPL-CCNC: 17 U/L (ref 10–40)
BASOPHILS # BLD AUTO: 0.09 K/UL (ref 0–0.2)
BASOPHILS NFR BLD: 1.1 % (ref 0–1.9)
BILIRUB SERPL-MCNC: 0.3 MG/DL (ref 0.1–1)
BUN SERPL-MCNC: 8 MG/DL (ref 8–23)
CALCIUM SERPL-MCNC: 8.3 MG/DL (ref 8.7–10.5)
CANCER AG19-9 SERPL-ACNC: 18 U/ML (ref 0–40)
CHLORIDE SERPL-SCNC: 106 MMOL/L (ref 95–110)
CO2 SERPL-SCNC: 26 MMOL/L (ref 23–29)
CREAT SERPL-MCNC: 0.9 MG/DL (ref 0.5–1.4)
DIFFERENTIAL METHOD BLD: ABNORMAL
EOSINOPHIL # BLD AUTO: 0.2 K/UL (ref 0–0.5)
EOSINOPHIL NFR BLD: 2.4 % (ref 0–8)
ERYTHROCYTE [DISTWIDTH] IN BLOOD BY AUTOMATED COUNT: 19.4 % (ref 11.5–14.5)
EST. GFR  (NO RACE VARIABLE): >60 ML/MIN/1.73 M^2
GLUCOSE SERPL-MCNC: 88 MG/DL (ref 70–110)
HCT VFR BLD AUTO: 33.7 % (ref 40–54)
HGB BLD-MCNC: 10.6 G/DL (ref 14–18)
IMM GRANULOCYTES # BLD AUTO: 0.04 K/UL (ref 0–0.04)
IMM GRANULOCYTES NFR BLD AUTO: 0.5 % (ref 0–0.5)
LYMPHOCYTES # BLD AUTO: 1.8 K/UL (ref 1–4.8)
LYMPHOCYTES NFR BLD: 22.1 % (ref 18–48)
MCH RBC QN AUTO: 26.3 PG (ref 27–31)
MCHC RBC AUTO-ENTMCNC: 31.5 G/DL (ref 32–36)
MCV RBC AUTO: 84 FL (ref 82–98)
MONOCYTES # BLD AUTO: 0.8 K/UL (ref 0.3–1)
MONOCYTES NFR BLD: 9.3 % (ref 4–15)
NEUTROPHILS # BLD AUTO: 5.2 K/UL (ref 1.8–7.7)
NEUTROPHILS NFR BLD: 64.6 % (ref 38–73)
NRBC BLD-RTO: 0 /100 WBC
OVALOCYTES BLD QL SMEAR: ABNORMAL
PLATELET # BLD AUTO: 339 K/UL (ref 150–450)
PLATELET BLD QL SMEAR: ABNORMAL
PMV BLD AUTO: 9.5 FL (ref 9.2–12.9)
POIKILOCYTOSIS BLD QL SMEAR: SLIGHT
POTASSIUM SERPL-SCNC: 4.6 MMOL/L (ref 3.5–5.1)
PROT SERPL-MCNC: 6.4 G/DL (ref 6–8.4)
RBC # BLD AUTO: 4.03 M/UL (ref 4.6–6.2)
SODIUM SERPL-SCNC: 140 MMOL/L (ref 136–145)
WBC # BLD AUTO: 8.07 K/UL (ref 3.9–12.7)

## 2025-02-03 PROCEDURE — 85025 COMPLETE CBC W/AUTO DIFF WBC: CPT | Mod: PN | Performed by: INTERNAL MEDICINE

## 2025-02-03 PROCEDURE — 99215 OFFICE O/P EST HI 40 MIN: CPT | Mod: S$PBB,,, | Performed by: INTERNAL MEDICINE

## 2025-02-03 PROCEDURE — G2211 COMPLEX E/M VISIT ADD ON: HCPCS | Mod: S$PBB,,, | Performed by: INTERNAL MEDICINE

## 2025-02-03 PROCEDURE — 86301 IMMUNOASSAY TUMOR CA 19-9: CPT | Performed by: INTERNAL MEDICINE

## 2025-02-03 PROCEDURE — 36415 COLL VENOUS BLD VENIPUNCTURE: CPT | Mod: PN | Performed by: INTERNAL MEDICINE

## 2025-02-03 PROCEDURE — 80053 COMPREHEN METABOLIC PANEL: CPT | Mod: PN | Performed by: INTERNAL MEDICINE

## 2025-02-03 PROCEDURE — 99214 OFFICE O/P EST MOD 30 MIN: CPT | Mod: PBBFAC,PN | Performed by: INTERNAL MEDICINE

## 2025-02-03 PROCEDURE — 99999 PR PBB SHADOW E&M-EST. PATIENT-LVL IV: CPT | Mod: PBBFAC,,, | Performed by: INTERNAL MEDICINE

## 2025-02-03 RX ORDER — HEPARIN 100 UNIT/ML
500 SYRINGE INTRAVENOUS
Status: CANCELLED | OUTPATIENT
Start: 2025-02-04

## 2025-02-03 RX ORDER — FLUOROURACIL 50 MG/ML
360 INJECTION, SOLUTION INTRAVENOUS
Status: CANCELLED | OUTPATIENT
Start: 2025-02-04

## 2025-02-03 RX ORDER — DIPHENHYDRAMINE HYDROCHLORIDE 50 MG/ML
50 INJECTION INTRAMUSCULAR; INTRAVENOUS ONCE AS NEEDED
Status: CANCELLED | OUTPATIENT
Start: 2025-02-04

## 2025-02-03 RX ORDER — PROCHLORPERAZINE EDISYLATE 5 MG/ML
5 INJECTION INTRAMUSCULAR; INTRAVENOUS ONCE AS NEEDED
Status: CANCELLED | OUTPATIENT
Start: 2025-02-04

## 2025-02-03 RX ORDER — EPINEPHRINE 0.3 MG/.3ML
0.3 INJECTION SUBCUTANEOUS ONCE AS NEEDED
Status: CANCELLED | OUTPATIENT
Start: 2025-02-04

## 2025-02-03 RX ORDER — ATROPINE SULFATE 0.4 MG/ML
0.4 INJECTION, SOLUTION ENDOTRACHEAL; INTRAMEDULLARY; INTRAMUSCULAR; INTRAVENOUS; SUBCUTANEOUS ONCE AS NEEDED
Status: CANCELLED | OUTPATIENT
Start: 2025-02-04

## 2025-02-03 RX ORDER — SODIUM CHLORIDE 0.9 % (FLUSH) 0.9 %
10 SYRINGE (ML) INJECTION
Status: CANCELLED | OUTPATIENT
Start: 2025-02-04

## 2025-02-03 NOTE — TELEPHONE ENCOUNTER
Called and spoke with pt's wife to confirm he was still coming to his appts; due to being late for his labs. Pt's wife verified that he did leave for his appt, but must be running late. I voiced understanding. Pt does not have a cell phone.

## 2025-02-04 ENCOUNTER — TELEPHONE (OUTPATIENT)
Dept: HEMATOLOGY/ONCOLOGY | Facility: CLINIC | Age: 70
End: 2025-02-04
Payer: MEDICARE

## 2025-02-04 ENCOUNTER — INFUSION (OUTPATIENT)
Dept: INFUSION THERAPY | Facility: HOSPITAL | Age: 70
End: 2025-02-04
Attending: INTERNAL MEDICINE
Payer: MEDICARE

## 2025-02-04 ENCOUNTER — DOCUMENTATION ONLY (OUTPATIENT)
Dept: INFUSION THERAPY | Facility: HOSPITAL | Age: 70
End: 2025-02-04
Payer: MEDICARE

## 2025-02-04 VITALS
OXYGEN SATURATION: 96 % | RESPIRATION RATE: 20 BRPM | HEART RATE: 73 BPM | TEMPERATURE: 98 F | SYSTOLIC BLOOD PRESSURE: 157 MMHG | BODY MASS INDEX: 26.02 KG/M2 | DIASTOLIC BLOOD PRESSURE: 98 MMHG | HEIGHT: 71 IN | WEIGHT: 185.88 LBS

## 2025-02-04 DIAGNOSIS — C25.9 MALIGNANT NEOPLASM OF PANCREAS, UNSPECIFIED LOCATION OF MALIGNANCY: Primary | ICD-10-CM

## 2025-02-04 PROCEDURE — 96367 TX/PROPH/DG ADDL SEQ IV INF: CPT | Mod: PN

## 2025-02-04 PROCEDURE — A4216 STERILE WATER/SALINE, 10 ML: HCPCS | Mod: PN | Performed by: INTERNAL MEDICINE

## 2025-02-04 PROCEDURE — 63600175 PHARM REV CODE 636 W HCPCS: Mod: PN | Performed by: INTERNAL MEDICINE

## 2025-02-04 PROCEDURE — 96416 CHEMO PROLONG INFUSE W/PUMP: CPT | Mod: PN

## 2025-02-04 PROCEDURE — 25000003 PHARM REV CODE 250: Mod: PN | Performed by: INTERNAL MEDICINE

## 2025-02-04 PROCEDURE — 96417 CHEMO IV INFUS EACH ADDL SEQ: CPT | Mod: PN

## 2025-02-04 PROCEDURE — 96413 CHEMO IV INFUSION 1 HR: CPT | Mod: PN

## 2025-02-04 PROCEDURE — 96368 THER/DIAG CONCURRENT INF: CPT | Mod: PN

## 2025-02-04 PROCEDURE — 96411 CHEMO IV PUSH ADDL DRUG: CPT | Mod: PN

## 2025-02-04 PROCEDURE — 96415 CHEMO IV INFUSION ADDL HR: CPT | Mod: PN

## 2025-02-04 RX ORDER — SODIUM CHLORIDE 0.9 % (FLUSH) 0.9 %
10 SYRINGE (ML) INJECTION
Status: DISCONTINUED | OUTPATIENT
Start: 2025-02-04 | End: 2025-02-04 | Stop reason: HOSPADM

## 2025-02-04 RX ORDER — HEPARIN 100 UNIT/ML
500 SYRINGE INTRAVENOUS
Status: DISCONTINUED | OUTPATIENT
Start: 2025-02-04 | End: 2025-02-04 | Stop reason: HOSPADM

## 2025-02-04 RX ORDER — PROCHLORPERAZINE EDISYLATE 5 MG/ML
5 INJECTION INTRAMUSCULAR; INTRAVENOUS ONCE AS NEEDED
Status: DISCONTINUED | OUTPATIENT
Start: 2025-02-04 | End: 2025-02-04 | Stop reason: HOSPADM

## 2025-02-04 RX ORDER — DIPHENHYDRAMINE HYDROCHLORIDE 50 MG/ML
50 INJECTION INTRAMUSCULAR; INTRAVENOUS ONCE AS NEEDED
Status: DISCONTINUED | OUTPATIENT
Start: 2025-02-04 | End: 2025-02-04 | Stop reason: HOSPADM

## 2025-02-04 RX ORDER — EPINEPHRINE 0.3 MG/.3ML
0.3 INJECTION SUBCUTANEOUS ONCE AS NEEDED
Status: DISCONTINUED | OUTPATIENT
Start: 2025-02-04 | End: 2025-02-04 | Stop reason: HOSPADM

## 2025-02-04 RX ORDER — FLUOROURACIL 50 MG/ML
360 INJECTION, SOLUTION INTRAVENOUS
Status: COMPLETED | OUTPATIENT
Start: 2025-02-04 | End: 2025-02-04

## 2025-02-04 RX ORDER — ATROPINE SULFATE 0.4 MG/ML
0.4 INJECTION, SOLUTION ENDOTRACHEAL; INTRAMEDULLARY; INTRAMUSCULAR; INTRAVENOUS; SUBCUTANEOUS ONCE AS NEEDED
Status: COMPLETED | OUTPATIENT
Start: 2025-02-04 | End: 2025-02-04

## 2025-02-04 RX ADMIN — SODIUM CHLORIDE, PRESERVATIVE FREE 10 ML: 5 INJECTION INTRAVENOUS at 09:02

## 2025-02-04 RX ADMIN — SODIUM CHLORIDE 338 MG: 9 INJECTION, SOLUTION INTRAVENOUS at 12:02

## 2025-02-04 RX ADMIN — SODIUM CHLORIDE: 9 INJECTION, SOLUTION INTRAVENOUS at 12:02

## 2025-02-04 RX ADMIN — ATROPINE SULFATE 0.4 MG: 0.4 INJECTION, SOLUTION INTRAVENOUS at 12:02

## 2025-02-04 RX ADMIN — OXALIPLATIN 174 MG: 5 INJECTION, SOLUTION INTRAVENOUS at 10:02

## 2025-02-04 RX ADMIN — LEUCOVORIN CALCIUM 740 MG: 350 INJECTION, POWDER, LYOPHILIZED, FOR SUSPENSION INTRAMUSCULAR; INTRAVENOUS at 12:02

## 2025-02-04 RX ADMIN — DEXTROSE MONOHYDRATE: 5 INJECTION, SOLUTION INTRAVENOUS at 09:02

## 2025-02-04 RX ADMIN — FLUOROURACIL 4000 MG: 50 INJECTION, SOLUTION INTRAVENOUS at 02:02

## 2025-02-04 RX ADMIN — PROCHLORPERAZINE EDISYLATE 5 MG: 5 INJECTION INTRAMUSCULAR; INTRAVENOUS at 09:02

## 2025-02-04 RX ADMIN — DEXAMETHASONE SODIUM PHOSPHATE 0.25 MG: 10 INJECTION, SOLUTION INTRAMUSCULAR; INTRAVENOUS at 09:02

## 2025-02-04 RX ADMIN — FLUOROURACIL 740 MG: 50 INJECTION, SOLUTION INTRAVENOUS at 01:02

## 2025-02-04 NOTE — PLAN OF CARE
Problem: Adult Inpatient Plan of Care  Goal: Plan of Care Review  Outcome: Met     Problem: Fatigue  Goal: Improved Activity Tolerance  Outcome: Progressing  Intervention: Promote Improved Energy  Flowsheets (Taken 2/4/2025 1039)  Fatigue Management:   activity schedule adjusted   fatigue-related activity identified   frequent rest breaks encouraged     Problem: Fatigue  Goal: Improved Activity Tolerance  Intervention: Promote Improved Energy  Flowsheets (Taken 2/4/2025 1039)  Fatigue Management:   activity schedule adjusted   fatigue-related activity identified   frequent rest breaks encouraged   TOLERATED INFUSION OF FOLFIRINOX WELL TODAY ABLE TO BE D/C WITH 5 FU PUMP INFUSIONING AS ORDERED  ALL CONNECTIONS VERIFIED TO BE CONNECTED TIGHTLY.  ANSWERED ALL QUESTIONS AND CONCERNS AND PT AMBULATED TO PRIVATE VEHICLE WITHOUT DIFFICULTY WITH SPOUSE NAD

## 2025-02-04 NOTE — PROGRESS NOTES
Oncology Nutrition   Chemotherapy Infusion Visit    Nutrition Follow Up   RD met with pt at chairside during infusion tx. Pt present for cycle 4 Folfirinox. Pt reports he is feeling much better and his appetite is good. Pt denies any difficulty chewing or any nutrition related side effects. Pt weight is trending back up from recent hospitalization.         Wt Readings from Last 10 Encounters:   02/04/25 84.3 kg (185 lb 13.6 oz)   02/03/25 84.3 kg (185 lb 13.6 oz)   01/31/25 86 kg (189 lb 9.5 oz)   01/28/25 86 kg (189 lb 9.5 oz)   01/27/25 78.3 kg (172 lb 9.9 oz)   01/17/25 78.3 kg (172 lb 9.9 oz)   01/08/25 87.5 kg (192 lb 14.4 oz)   01/06/25 87.5 kg (192 lb 14.4 oz)   01/03/25 87.5 kg (192 lb 14.4 oz)   12/20/24 87.4 kg (192 lb 10.9 oz)       All other nutrition questions/concerns addressed as appropriate. Will continue to monitor prn throughout treatment.     Adelita Carroll, RADHIKA, LDN  02/04/2025  12:07 PM

## 2025-02-05 ENCOUNTER — TELEPHONE (OUTPATIENT)
Dept: HEMATOLOGY/ONCOLOGY | Facility: CLINIC | Age: 70
End: 2025-02-05
Payer: MEDICARE

## 2025-02-06 ENCOUNTER — TELEPHONE (OUTPATIENT)
Dept: HEMATOLOGY/ONCOLOGY | Facility: CLINIC | Age: 70
End: 2025-02-06
Payer: MEDICARE

## 2025-02-06 ENCOUNTER — PATIENT MESSAGE (OUTPATIENT)
Dept: HEMATOLOGY/ONCOLOGY | Facility: CLINIC | Age: 70
End: 2025-02-06
Payer: MEDICARE

## 2025-02-06 ENCOUNTER — INFUSION (OUTPATIENT)
Dept: INFUSION THERAPY | Facility: HOSPITAL | Age: 70
End: 2025-02-06
Attending: INTERNAL MEDICINE
Payer: MEDICARE

## 2025-02-06 VITALS
DIASTOLIC BLOOD PRESSURE: 98 MMHG | HEART RATE: 87 BPM | TEMPERATURE: 98 F | RESPIRATION RATE: 14 BRPM | HEIGHT: 71 IN | BODY MASS INDEX: 26.02 KG/M2 | SYSTOLIC BLOOD PRESSURE: 147 MMHG | WEIGHT: 185.88 LBS

## 2025-02-06 DIAGNOSIS — C25.9 MALIGNANT NEOPLASM OF PANCREAS, UNSPECIFIED LOCATION OF MALIGNANCY: Primary | ICD-10-CM

## 2025-02-06 PROCEDURE — 25000003 PHARM REV CODE 250: Mod: PN | Performed by: INTERNAL MEDICINE

## 2025-02-06 PROCEDURE — 96377 APPLICATON ON-BODY INJECTOR: CPT | Mod: PN

## 2025-02-06 PROCEDURE — A4216 STERILE WATER/SALINE, 10 ML: HCPCS | Mod: PN | Performed by: INTERNAL MEDICINE

## 2025-02-06 PROCEDURE — 63600175 PHARM REV CODE 636 W HCPCS: Mod: JZ,TB,PN | Performed by: INTERNAL MEDICINE

## 2025-02-06 RX ORDER — HEPARIN 100 UNIT/ML
500 SYRINGE INTRAVENOUS
Status: DISCONTINUED | OUTPATIENT
Start: 2025-02-06 | End: 2025-02-06 | Stop reason: HOSPADM

## 2025-02-06 RX ORDER — SODIUM CHLORIDE 0.9 % (FLUSH) 0.9 %
10 SYRINGE (ML) INJECTION
Status: CANCELLED | OUTPATIENT
Start: 2025-02-06

## 2025-02-06 RX ORDER — HEPARIN 100 UNIT/ML
500 SYRINGE INTRAVENOUS
Status: CANCELLED | OUTPATIENT
Start: 2025-02-06

## 2025-02-06 RX ORDER — PROCHLORPERAZINE EDISYLATE 5 MG/ML
5 INJECTION INTRAMUSCULAR; INTRAVENOUS ONCE AS NEEDED
Status: DISCONTINUED | OUTPATIENT
Start: 2025-02-06 | End: 2025-02-06 | Stop reason: HOSPADM

## 2025-02-06 RX ORDER — PROCHLORPERAZINE EDISYLATE 5 MG/ML
5 INJECTION INTRAMUSCULAR; INTRAVENOUS ONCE AS NEEDED
Status: CANCELLED | OUTPATIENT
Start: 2025-02-06

## 2025-02-06 RX ORDER — SODIUM CHLORIDE 0.9 % (FLUSH) 0.9 %
10 SYRINGE (ML) INJECTION
Status: DISCONTINUED | OUTPATIENT
Start: 2025-02-06 | End: 2025-02-06 | Stop reason: HOSPADM

## 2025-02-06 RX ADMIN — PEGFILGRASTIM 6 MG: KIT SUBCUTANEOUS at 12:02

## 2025-02-06 RX ADMIN — SODIUM CHLORIDE, PRESERVATIVE FREE 10 ML: 5 INJECTION INTRAVENOUS at 12:02

## 2025-02-06 NOTE — NURSING
Spoke with patient and scheduled appointment for 03/05/2025 with Dr. Matthew Butler; Provided patient with appointment time and date, address of [facility]. Direct line to navigator provided. All questions and concerns addressed.     none

## 2025-02-06 NOTE — PLAN OF CARE
Problem: Adult Inpatient Plan of Care  Goal: Patient-Specific Goal (Individualized)  2/6/2025 1236 by Radha Velazco, RN  Outcome: Progressing  2/6/2025 1235 by Radha Velazco, RN  Outcome: Progressing  Goal: Optimal Comfort and Wellbeing  Outcome: Progressing  Intervention: Provide Person-Centered Care  Flowsheets (Taken 2/6/2025 1235)  Trust Relationship/Rapport:   care explained   questions answered   choices provided   questions encouraged     Problem: Fatigue  Goal: Improved Activity Tolerance  Outcome: Progressing  Intervention: Promote Improved Energy  Flowsheets (Taken 2/6/2025 1235)  Activity Management:   Ambulated in [place] - L4   Up in chair - L3   Pt came in for pump D/C. Pt tolerated home 5FU well and had no complaints. PAC flushed w/ NS and deaccessed. Neulasta OBI applied to right arm.  Patient upon discharge.

## 2025-02-10 ENCOUNTER — TELEPHONE (OUTPATIENT)
Dept: INFUSION THERAPY | Facility: HOSPITAL | Age: 70
End: 2025-02-10
Payer: MEDICARE

## 2025-02-10 NOTE — TELEPHONE ENCOUNTER
----- Message from Carmen sent at 2/10/2025 10:51 AM CST -----  Type: Needs Medical Advice  Who Called:  patient   Symptoms (please be specific):    How long has patient had these symptoms:    Pharmacy name and phone #:    Best Call Back Number: [phone]  Additional Information: pt is requesting a call back regarding an appt on 2/20

## 2025-02-10 NOTE — TELEPHONE ENCOUNTER
Spoke with the patient and let him know that his infusion was rescheduled to after he sees Dr Suero

## 2025-02-14 PROBLEM — E87.29 METABOLIC ACIDOSIS, INCREASED ANION GAP: Status: ACTIVE | Noted: 2024-12-17

## 2025-02-14 PROBLEM — R53.81 DEBILITY: Status: ACTIVE | Noted: 2024-12-19

## 2025-02-16 PROBLEM — E83.42 HYPOMAGNESEMIA: Status: ACTIVE | Noted: 2025-02-16

## 2025-02-20 ENCOUNTER — TELEPHONE (OUTPATIENT)
Dept: INFUSION THERAPY | Facility: HOSPITAL | Age: 70
End: 2025-02-20
Payer: MEDICARE

## 2025-02-24 ENCOUNTER — TELEPHONE (OUTPATIENT)
Dept: HEMATOLOGY/ONCOLOGY | Facility: CLINIC | Age: 70
End: 2025-02-24
Payer: MEDICARE

## 2025-02-26 ENCOUNTER — TELEPHONE (OUTPATIENT)
Dept: HEMATOLOGY/ONCOLOGY | Facility: CLINIC | Age: 70
End: 2025-02-26
Payer: MEDICARE

## 2025-02-26 NOTE — TELEPHONE ENCOUNTER
----- Message from Elayne sent at 2/26/2025  8:06 AM CST -----  Type: Needs Medical AdviceWho Called:  PtBest Call Back Number: [phone] Additional Information:requesting a call back to get appt  after CT on 3/17

## 2025-02-27 ENCOUNTER — PATIENT MESSAGE (OUTPATIENT)
Dept: HEMATOLOGY/ONCOLOGY | Facility: CLINIC | Age: 70
End: 2025-02-27
Payer: MEDICARE

## 2025-03-11 ENCOUNTER — TELEPHONE (OUTPATIENT)
Dept: HEMATOLOGY/ONCOLOGY | Facility: CLINIC | Age: 70
End: 2025-03-11
Payer: MEDICARE

## 2025-03-18 ENCOUNTER — TELEPHONE (OUTPATIENT)
Dept: HEMATOLOGY/ONCOLOGY | Facility: CLINIC | Age: 70
End: 2025-03-18
Payer: MEDICARE

## 2025-03-18 NOTE — TELEPHONE ENCOUNTER
----- Message from Elayne sent at 3/18/2025  9:24 AM CDT -----  Type: Needs Medical AdviceWho Called:  ptBest Call Back Number: [phone] Additional Information: requesting a call back bout results of scan from 3/17

## 2025-03-18 NOTE — TELEPHONE ENCOUNTER
Advised patient Dr Suero will review these results on Friday at his appt and give any further recommendations.

## 2025-03-20 NOTE — PROGRESS NOTES
PATIENT: Fred Benjamin  MRN: [id number]  DATE: 3/22/2025      Diagnosis:   1. Malignant neoplasm of pancreas, unspecified location of malignancy    2. Microcytic anemia    3. Immunosuppression due to drug therapy    4. Pulmonary nodules    5. Paroxysmal atrial fibrillation    6. Essential hypertension    7. Normocytic anemia                  Chief Complaint: Malignant neoplasm of pancreas, unspecified location of mal (6 week follow up for CT results )      Oncologic History:      Oncologic History     Oncologic Treatment     Pathology           Subjective:    Interval History:  Mr. Benjamin is a 70 y.o. male with Arthritis, A-fib, GERD, HTN, BHUMIKA who presents for pancreatic cancer.  Since the last clinic visit the patient was admitted to the hospital from 02/14/2025 until 02/20/2025 for acute kidney injury related to volume depletion from nausea, vomiting and diarrhea.  Pt underwent CT chest on 03/17/2025 showing stable shotty lymph nodes in the right hilum measuring 1.8 x 1 cm; ground-glass opacities identified in the left lower lobe have resolved.  CT abdomen and pelvis on 03/17/2025 showed fatty involution of the pancreas; small exophytic nodule in the spleen unchanged; fecal material in the sigmoid colon.    The patient denies CP, cough, SOB, abdominal pain, nausea, vomiting, constipation, diarrhea.  The patient denies fever, chills, night sweats, weight loss, new lumps or bumps, easy bruising or bleeding.    Prior History:   Pt initially underwetn US abdomen 8/02/24 for work up of abdominal pain.  US showed mild intrahepatic ductal dilatation.  PT underwent EUS on 9/13/24 showing for stones in the common bile duct; mass in the pancreatic tail which was biopsied; cystic lesion in the pancreatic body.  Biopsy returned positive for adenocarcinoma.  The patient was discussed at tumor board on 09/18/2024 with recommendation for referral to Surgical Oncology and Medical Oncology.  The patient underwent CT chest,  abdomen, and pelvis on 09/1924 showing a small pleural plaque of platelike atelectasis anteriorly in the right hemithorax; intra and extrahepatic biliary duct dilatation; faint density within the common duct which could represent faintly calcified stones, sludge, or debris; large amount of pancreatic fat stranding and fluid around the tail of the pancreas.  Patient underwent ERCP on 09/20/2024 showing dilatation of the entire main bile duct with stone causing obstruction.  Patient underwent ampullary dilatation and biliary sphincterotomy with the biliary tree swept.  Patient met with surgical oncologist Dr. Butler on 09/25/2024 whom recommended 4 cycles chemotherapy followed by restaging and consideration for distal pancreatectomy.  The patient went to [facility] on 10[facility]03[facility]2024 and underwent CT chest, abdomen, and pelvis showing Variable sized pulmonary nodules predominantly within the left lung with a dominant lesion having irregular border measuring 12 mm in the next most prominent nodule measuring 7 mm; mild emphysema; bilobed fluid or 6 appearing lesion in the pancreatic tail; and chronic dilatation of the common bile duct.  The patient met with medical oncologist Dr. Olson on 10/03/2024 whom recommended 12 cycles of FOLFIRINOX and perioperative management.   The patient underwent US abdomen 1/28/25 showing an abscess in the RLQ of the abdomen.  Pt saw Dr Alicia on 1/31/25 and underwent I&D of right abdominal abscess.    Past Medical History:   Past Medical History:   Diagnosis Date    Anemia     Arthritis     Cancer 03/2017    Bladder    Cancer related pain     Chronic diastolic CHF (congestive heart failure)     GERD (gastroesophageal reflux disease)     Headache(784.0)     Hypertension     Pacemaker     Pancreatic cancer     chemo    Paroxysmal atrial fibrillation     Prediabetes     Rash     Sleep apnea     no cpap       Past Surgical HIstory:   Past Surgical History:   Procedure Laterality Date    ABCESS  DRAINAGE  06/01/2018    I&D with irrigation abcess left lower back    ABLATION      ATRIAL ABLATION SURGERY      x 2    BLADDER TUMOR EXCISION  03/14/2017     cysto/Cancer    CARDIAC PACEMAKER PLACEMENT      CARDIOVERSION      carpel tunnel Bilateral     CERVICAL FUSION      x3    CHOLECYSTECTOMY      COLONOSCOPY      ENDOSCOPIC ULTRASOUND OF UPPER GASTROINTESTINAL TRACT Left 09/13/2024    Procedure: ULTRASOUND, UPPER GI TRACT, ENDOSCOPIC;  Surgeon: Gerald Ochoa MD;  Location: [facility] ENDO;  Service: Endoscopy;  Laterality: Left;    ERCP N/A 09/20/2024    Procedure: ERCP (ENDOSCOPIC RETROGRADE CHOLANGIOPANCREATOGRAPHY);  Surgeon: Mariano Ferreira III, MD;  Location: [facility] ENDO;  Service: Endoscopy;  Laterality: N/A;    ESOPHAGUS SURGERY      ting and then reversal    FOOT FRACTURE SURGERY      FRACTURE SURGERY      INCISION AND DRAINAGE OF ABSCESS Left 06/01/2018    Procedure: INCISION AND DRAINAGE, ABSCESS - left, lower back;  Surgeon: Irving Edouard MD;  Location: [facility] OR;  Service: Orthopedics;  Laterality: Left;    INSERTION OF TUNNELED CENTRAL VENOUS CATHETER (CVC) WITH SUBCUTANEOUS PORT N/A 10/18/2024    Procedure: [id number]-CATH;  Surgeon: Irving Alicia MD;  Location: [facility] OR;  Service: General;  Laterality: N/A;    KNEE ARTHROSCOPY W/ MENISCAL REPAIR Left     ROTATOR CUFF REPAIR Left        Family History:   Family History   Problem Relation Name Age of Onset    Coronary artery disease Mother      Diabetes Mother      Cancer Father          Lymphoma       Social History:  reports that he has quit smoking. His smoking use included cigars and cigarettes. He has a 20 pack-year smoking history. He has been exposed to tobacco smoke. He has never used smokeless tobacco. He reports that he does not drink alcohol and does not use drugs.    Allergies:  Review of patient's allergies indicates:   Allergen Reactions    Cleocin [clindamycin hcl] Shortness Of Breath     Constipation    Amiodarone      Amiodarone analogues      Affected thyroid    Dabigatran etexilate     Rythmol [propafenone]      Weight loss/ nausea       Medications:  Current Outpatient Medications   Medication Sig Dispense Refill    amLODIPine (NORVASC) 10 MG tablet Take 10 mg by mouth every evening.      ammonium lactate 12 % Crea Apply 1 Application topically As instructed (1-2 times a day to whole body for dry skin).      ELIQUIS 5 mg Tab Take 5 mg by mouth 2 (two) times daily.       morphine (MS CONTIN) 60 MG 12 hr tablet Take 60 mg by mouth 3 (three) times daily.      naphazoline-pheniramine 0.025-0.3% (NAPHCON-A) 0.025-0.3 % ophthalmic solution Place 1-2 drops into both eyes daily as needed (for allergies/dry eyes).      oxyCODONE (ROXICODONE) 30 MG Tab Take 30 mg by mouth 4 (four) times daily as needed (for pain).      sotaloL (BETAPACE) 160 MG Tab Take 1 tablet (160 mg total) by mouth once daily. 30 tablet 11     No current facility-administered medications for this visit.       Review of Systems   Constitutional:  Negative for chills, diaphoresis, fatigue, fever and unexpected weight change.   Respiratory:  Negative for cough and shortness of breath.    Cardiovascular:  Negative for chest pain and palpitations.   Gastrointestinal:  Negative for abdominal pain, constipation, diarrhea, nausea and vomiting.   Skin:  Negative for color change and rash.   Neurological:  Negative for headaches.   Hematological:  Negative for adenopathy. Does not bruise/bleed easily.       ECOG Performance Status: 1   Objective:      Vitals:   Vitals:    03/21/25 0824   BP: 114/88   BP Location: Left arm   Patient Position: Sitting   Pulse: 87   Resp: 16   Temp: 98.2 °F (36.8 °C)   TempSrc: Temporal   SpO2: 95%   Weight: 83.9 kg (184 lb 15.5 oz)   Height: 6' (1.829 m)                 Physical Exam  Constitutional:       General: He is not in acute distress.     Appearance: He is well-developed. He is not diaphoretic.   HENT:      Head: Normocephalic and  atraumatic.   Cardiovascular:      Rate and Rhythm: Normal rate and regular rhythm.      Heart sounds: Normal heart sounds. No murmur heard.     No friction rub. No gallop.   Pulmonary:      Effort: Pulmonary effort is normal. No respiratory distress.      Breath sounds: Normal breath sounds. No wheezing or rales.   Chest:      Chest wall: No tenderness.   Abdominal:      General: Bowel sounds are normal. There is no distension.      Palpations: Abdomen is soft. There is no mass.      Tenderness: There is no abdominal tenderness. There is no rebound.   Musculoskeletal:      Cervical back: Normal range of motion.   Lymphadenopathy:      Cervical: No cervical adenopathy.      Upper Body:      Right upper body: No supraclavicular or axillary adenopathy.      Left upper body: No supraclavicular or axillary adenopathy.   Skin:     General: Skin is warm and dry.      Findings: No erythema or rash.   Neurological:      Mental Status: He is alert and oriented to person, place, and time.   Psychiatric:         Behavior: Behavior normal.         Laboratory Data:  Lab Visit on 03/21/2025   Component Date Value Ref Range Status    WBC 03/21/2025 6.83  3.90 - 12.70 K/uL Final    RBC 03/21/2025 3.83 (L)  4.60 - 6.20 M/uL Final    Hemoglobin 03/21/2025 10.5 (L)  14.0 - 18.0 g/dL Final    Hematocrit 03/21/2025 34.0 (L)  40.0 - 54.0 % Final    MCV 03/21/2025 89  82 - 98 fL Final    MCH 03/21/2025 27.4  27.0 - 31.0 pg Final    MCHC 03/21/2025 30.9 (L)  32.0 - 36.0 g/dL Final    RDW 03/21/2025 17.4 (H)  11.5 - 14.5 % Final    Platelets 03/21/2025 178  150 - 450 K/uL Final    MPV 03/21/2025 9.8  9.2 - 12.9 fL Final    Immature Granulocytes 03/21/2025 0.4  0.0 - 0.5 % Final    Gran # (ANC) 03/21/2025 4.6  1.8 - 7.7 K/uL Final    Immature Grans (Abs) 03/21/2025 0.03  0.00 - 0.04 K/uL Final    Comment: Mild elevation in immature granulocytes is non specific and   can be seen in a variety of conditions including stress response,   acute  inflammation, trauma and pregnancy. Correlation with other   laboratory and clinical findings is essential.      Lymph # 03/21/2025 1.3  1.0 - 4.8 K/uL Final    Mono # 03/21/2025 0.7  0.3 - 1.0 K/uL Final    Eos # 03/21/2025 0.1  0.0 - 0.5 K/uL Final    Baso # 03/21/2025 0.07  0.00 - 0.20 K/uL Final    nRBC 03/21/2025 0  0 /100 WBC Final    Gran % 03/21/2025 67.9  38.0 - 73.0 % Final    Lymph % 03/21/2025 19.2  18.0 - 48.0 % Final    Mono % 03/21/2025 9.5  4.0 - 15.0 % Final    Eosinophil % 03/21/2025 2.0  0.0 - 8.0 % Final    Basophil % 03/21/2025 1.0  0.0 - 1.9 % Final    Differential Method 03/21/2025 Automated   Final    Sodium 03/21/2025 137  136 - 145 mmol/L Final    Potassium 03/21/2025 4.7  3.5 - 5.1 mmol/L Final    Chloride 03/21/2025 106  95 - 110 mmol/L Final    CO2 03/21/2025 22 (L)  23 - 29 mmol/L Final    Glucose 03/21/2025 91  70 - 110 mg/dL Final    BUN 03/21/2025 13  8 - 23 mg/dL Final    Creatinine 03/21/2025 1.0  0.5 - 1.4 mg/dL Final    Calcium 03/21/2025 8.1 (L)  8.7 - 10.5 mg/dL Final    Total Protein 03/21/2025 6.5  6.0 - 8.4 g/dL Final    Albumin 03/21/2025 3.0 (L)  3.5 - 5.2 g/dL Final    Total Bilirubin 03/21/2025 0.3  0.1 - 1.0 mg/dL Final    Comment: For infants and newborns, interpretation of results should be based  on gestational age, weight and in agreement with clinical  observations.    Premature Infant recommended reference ranges:  Up to 24 hours.............<8.0 mg/dL  Up to 48 hours............<12.0 mg/dL  3-5 days..................<15.0 mg/dL  6-29 days.................<15.0 mg/dL      Alkaline Phosphatase 03/21/2025 102  40 - 150 U/L Final    AST 03/21/2025 16  10 - 40 U/L Final    ALT 03/21/2025 10  10 - 44 U/L Final    eGFR 03/21/2025 >60.0  >60 mL/min/1.73 m^2 Final    Anion Gap 03/21/2025 9  8 - 16 mmol/L Final    CA 19-9 03/21/2025 13.6  0.0 - 40.0 U/mL Final    Comment: The testing method is a chemiluminescent microparticle immunoassay   manufactured by [employer] and performed on the    or   [employer] system. Values obtained with different assay manufacturers   for   methods may be different and cannot be used interchangeably.      Ferritin 03/21/2025 153  20.0 - 300.0 ng/mL Final    Iron 03/21/2025 43 (L)  45 - 160 ug/dL Final    Transferrin 03/21/2025 199 (L)  200 - 375 mg/dL Final    TIBC 03/21/2025 295  250 - 450 ug/dL Final    Saturated Iron 03/21/2025 15 (L)  20 - 50 % Final         Imaging:     CT chest 3/17/25  Base of Neck: No significant abnormality. The visualized portions of the thyroid lobes appear unremarkable.     Thoracic soft tissues: Normal. Infusion catheter remains in the right chest with the tip of the catheter identified in the superior vena cava. Dual lead pacemaker remains on the left.     Aorta: Left-sided aortic arch. No aneurysm and no significant atherosclerosis     Heart: Normal size. No effusion. Mild-to-moderate coronary vascular calcifications are seen.     Pulmonary vasculature: Pulmonary arteries distribute normally. No large intraluminal filling defects are identified. There are four pulmonary veins.     Carolin/Mediastinum: No mediastinal or left hilar adenopathy identified. Shotty lymph node is seen in the right hilum measuring 1.8 x 1.0 cm, not significantly changed in comparison to prior study dated September 19, 2024.     Airways: Patent.     Lungs/Pleura: Emphysematous changes are again identified in both lung fields. Ground-glass opacities identified in the left lower lobe on the prior study have resolved. Minimal atelectatic banding is identified bilaterally. No airspace consolidations are seen. Minimal scarring and or atelectatic banding is identified in the lingula.     Esophagus: Normal.     Upper Abdomen: Pneumobilia is again identified in the liver, similar to that noted on the prior study. Postoperative changes consistent with prior cholecystectomy are noted. Prominence of the common bile duct is seen,  stable. The adrenal glands appear unremarkable. No upper abdominal adenopathy is seen.     Bones: No acute fracture. No suspicious lytic or sclerotic lesions.    CT Abdomen and Pelvis 3/17/2/5  The visualized portions of the lung bases demonstrate no abnormalities. No discrete masses are identified. No consolidations are noted. No effusions are seen. The heart size is within normal limits. Pacemaker is in place. No pericardial effusion is noted.     In the abdomen, the liver is normal in size. Mild decreased attenuation is again seen consistent with fatty infiltration. No discrete hepatic masses are identified. Pneumobilia is again seen. The portal and hepatic veins appear unremarkable. The gallbladder is surgically absent. There is mild prominence of the common bile duct, unchanged in comparison to the prior study and consistent with the post cholecystectomy status. Fatty involution of the pancreas is identified. No discrete pancreatic masses are seen. No pancreatic ductal dilatation is identified. No peripancreatic inflammatory changes are seen. Small exophytic nodule is identified in the spleen along the medial aspect unchanged. Small hypodensity within the spleen likely represents a small cyst is unchanged. No new splenic nodules are noted. The adrenal glands appear unremarkable. The kidneys concentrate contrast satisfactorily without masses or hydronephrosis. No renal calculi are identified. The ureters appear unremarkable. The aorta tapers normally. The mesenteric vessels appear widely patent. Single left renal artery and main renal artery and accessory renal artery to the right kidney appear widely patent. No retroperitoneal or mesenteric adenopathy identified.     Review of the bowel demonstrates a small hiatal hernia. The stomach is otherwise unremarkable. Small bowel is normal in caliber throughout. No mucosal thickening or discrete masses are identified. The appendix is likely surgically absent. No  pericolonic inflammatory changes or discrete colonic masses are identified. Noninflamed sigmoid diverticula are noted. No obstruction is seen. Adherent fecal material is identified in the sigmoid colon which makes evaluation for polypoid mass is not possible. No free air or free fluid are identified. Postoperative changes consistent with prior hernia repair with mesh are identified at the level of the umbilicus.     In the pelvis, small fat containing inguinal hernia is seen. The bladder appears unremarkable. Prostate is within normal limits. No pelvic masses or pelvic adenopathy identified.     Review of the bony structures demonstrates degenerative changes in the spine. No bony destructive lesions are identified to suggest osseous metastatic disease.       Assessment:       1. Malignant neoplasm of pancreas, unspecified location of malignancy    2. Microcytic anemia    3. Immunosuppression due to drug therapy    4. Pulmonary nodules    5. Paroxysmal atrial fibrillation    6. Essential hypertension    7. Normocytic anemia                     Plan:     Adenocarcinoma of the Pancreas - patient with adenocarcinoma in the tail of the pancreas status post biopsy 09/13/2024  -CT chest, abdomen, and pelvis on 10/03/2024 showed a lesion in the tail of the pancreas as well as multiple pulmonary nodules with 1 nodule measuring at least 12 mm  -Will obtain PET-CT  -Pt seen by Dr Butler in surgical oncology at [facility] 09/25/2024 whom recommended 4 cycles of chemo followed by restaging  -Patient seen by  at [facility] on 10/03/2024 recommending perioperative FOLFIRINOX with elimination of bolus fluorouracil and leucovorin  -Pharmacogenomic showed the patient to have normal UGT1A1 and DPYD genes  -[employer]ae germline testing negative  -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   Nodule in left hip felt to  be trochanteric bursitis  -Irinotecan reduced to 165mg/mm2 and Fluorouracil by 20% due to prior diarrhea and side effects  -PT completed 4 cycles of FOLFIRINOX  -CT C/A/P 3/17/25 showed ZAYDA  -Case discussed with Dr. Butler whom recommended holding off on additional chemo priro to his appt with the patient on 4/02/25 at which point surgery will be discussed  Visit today included increased complexity associated with the care of the episodic problem (chemotherapy) addressed and managing the longitudinal care of the patient due to the serious and/or complex managed problem(s) pancreatic cancer.    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 - pt at increased risk of infection  -No current signs of infection  -Will monitor    Pulmonary Nodules - Seen on Ct chest 10/03/24 with largest nodule in left lung  -PET/CT 10/17/24 showed no concerning nodules  -Stable on CT 3/17/25  -Will monitor    A-fib - pt on eliquis and sotalol  -Cardiology managing  -Will monitor    HTN - pt on amlodipine, sotalol  -BP ustable  -Will monitor    B12 Deficiency - low on 8/02/24 at 195pg/mL  -Pt on B12 injection monthly  -Will monitor    Anemia  - due to chemo  -Hemoglobin 10.5g/dL on 3/21/25  -Stable  -Will monitor    Route Chart for Scheduling    Med Onc Chart Routing      Follow up with physician Other. Pt needs an appt with me on 4/02/25 after he sees Dr Butler.  Pt needs labs today with CBC, CMP, , ferritin and iron/TIBC.   Follow up with KB    Infusion scheduling note    Injection scheduling note    Labs    Imaging    Pharmacy appointment    Other referrals              Treatment Plan Information   OP GI FOLFIRINOX (oxaliplatin, leucovorin, irinotecan, fluorouracil) Q2W  Stephan Suero MD   Associated diagnosis: Pancreatic cancer Stage IA cT1c, cN0, cM0 noted on 10/11/2024   Line of treatment: Neoadjuvant  Treatment Goal: Curative     Upcoming Treatment Dates - OP GI FOLFIRINOX (oxaliplatin, leucovorin, irinotecan, fluorouracil) Q2W      2/18/2025       Chemotherapy       oxaliplatin (ELOXATIN) 85 mg/m2 = 174 mg in D5W 534.8 mL chemo infusion       leucovorin calcium 360 mg/m2 = 740 mg in D5W 250 mL infusion       irinotecan (CAMPTOSAR) 165 mg/m2 = 338 mg in 0.9% NaCl 516.9 mL chemo infusion       fluorouraciL injection 740 mg       fluorouracil (Adrucil) 2,000 mg/m2 = 4,100 mg in 0.9% NaCl 100 mL chemo infusion       Antiemetics       palonosetron 0.25mg/dexAMETHasone 12mg in NS IVPB 0.25 mg 50 mL  3/4/2025       Chemotherapy       oxaliplatin (ELOXATIN) 85 mg/m2 = 174 mg in D5W 534.8 mL chemo infusion       leucovorin calcium 360 mg/m2 = 740 mg in D5W 250 mL infusion       irinotecan (CAMPTOSAR) 165 mg/m2 = 338 mg in 0.9% NaCl 516.9 mL chemo infusion       fluorouraciL injection 740 mg       fluorouracil (Adrucil) 2,000 mg/m2 = 4,100 mg in 0.9% NaCl 100 mL chemo infusion       Antiemetics       palonosetron 0.25mg/dexAMETHasone 12mg in NS IVPB 0.25 mg 50 mL  3/18/2025       Chemotherapy       oxaliplatin (ELOXATIN) 85 mg/m2 = 174 mg in D5W 534.8 mL chemo infusion       leucovorin calcium 360 mg/m2 = 740 mg in D5W 250 mL infusion       irinotecan (CAMPTOSAR) 165 mg/m2 = 338 mg in 0.9% NaCl 516.9 mL chemo infusion       fluorouraciL injection 740 mg       fluorouracil (Adrucil) 2,000 mg/m2 = 4,100 mg in 0.9% NaCl 100 mL chemo infusion       Antiemetics       palonosetron 0.25mg/dexAMETHasone 12mg in NS IVPB 0.25 mg 50 mL  4/1/2025       Chemotherapy       oxaliplatin (ELOXATIN) 85 mg/m2 = 174 mg in D5W 534.8 mL chemo infusion       leucovorin calcium 360 mg/m2 = 740 mg in D5W 250 mL infusion       irinotecan (CAMPTOSAR) 165 mg/m2 = 338 mg in 0.9% NaCl 516.9 mL chemo infusion       fluorouraciL injection 740 mg       fluorouracil (Adrucil) 2,000 mg/m2 = 4,100 mg in 0.9% NaCl 100 mL chemo infusion       Antiemetics       palonosetron 0.25mg/dexAMETHasone 12mg in NS IVPB 0.25 mg 50 mL    Supportive Plan Information  IV FLUIDS AND ELECTROLYTES [place]  BERRY Gr NP   Associated Diagnosis: Dehydration   noted on 11/5/2024  Associated Diagnosis: Chemotherapy induced diarrhea   noted on 11/1/2024  Associated Diagnosis: Diarrhea   noted on 11/19/2024   Line of treatment: Supportive Care   Treatment goal: Supportive     Upcoming Treatment Dates - IV FLUIDS AND ELECTROLYTES    No upcoming days in selected categories.      Stephan Suero MD  [facility]  Hematology and Oncology  [facility]   [address] [place] [address]   O: [phone]  F: [phone]

## 2025-03-21 ENCOUNTER — LAB VISIT (OUTPATIENT)
Dept: LAB | Facility: HOSPITAL | Age: 70
End: 2025-03-21
Attending: INTERNAL MEDICINE
Payer: MEDICARE

## 2025-03-21 ENCOUNTER — OFFICE VISIT (OUTPATIENT)
Dept: HEMATOLOGY/ONCOLOGY | Facility: CLINIC | Age: 70
End: 2025-03-21
Payer: MEDICARE

## 2025-03-21 VITALS
HEART RATE: 87 BPM | RESPIRATION RATE: 16 BRPM | OXYGEN SATURATION: 95 % | DIASTOLIC BLOOD PRESSURE: 88 MMHG | BODY MASS INDEX: 25.05 KG/M2 | WEIGHT: 184.94 LBS | HEIGHT: 72 IN | TEMPERATURE: 98 F | SYSTOLIC BLOOD PRESSURE: 114 MMHG

## 2025-03-21 DIAGNOSIS — R91.8 PULMONARY NODULES: ICD-10-CM

## 2025-03-21 DIAGNOSIS — I48.0 PAROXYSMAL ATRIAL FIBRILLATION: ICD-10-CM

## 2025-03-21 DIAGNOSIS — D50.9 MICROCYTIC ANEMIA: ICD-10-CM

## 2025-03-21 DIAGNOSIS — C25.9 MALIGNANT NEOPLASM OF PANCREAS, UNSPECIFIED LOCATION OF MALIGNANCY: Primary | ICD-10-CM

## 2025-03-21 DIAGNOSIS — C25.9 MALIGNANT NEOPLASM OF PANCREAS, UNSPECIFIED LOCATION OF MALIGNANCY: ICD-10-CM

## 2025-03-21 DIAGNOSIS — D84.821 IMMUNOSUPPRESSION DUE TO DRUG THERAPY: ICD-10-CM

## 2025-03-21 DIAGNOSIS — D64.9 NORMOCYTIC ANEMIA: ICD-10-CM

## 2025-03-21 DIAGNOSIS — Z79.899 IMMUNOSUPPRESSION DUE TO DRUG THERAPY: ICD-10-CM

## 2025-03-21 DIAGNOSIS — I10 ESSENTIAL HYPERTENSION: ICD-10-CM

## 2025-03-21 LAB
ALBUMIN SERPL BCP-MCNC: 3 G/DL (ref 3.5–5.2)
ALP SERPL-CCNC: 102 U/L (ref 40–150)
ALT SERPL W/O P-5'-P-CCNC: 10 U/L (ref 10–44)
ANION GAP SERPL CALC-SCNC: 9 MMOL/L (ref 8–16)
AST SERPL-CCNC: 16 U/L (ref 10–40)
BASOPHILS # BLD AUTO: 0.07 K/UL (ref 0–0.2)
BASOPHILS NFR BLD: 1 % (ref 0–1.9)
BILIRUB SERPL-MCNC: 0.3 MG/DL (ref 0.1–1)
BUN SERPL-MCNC: 13 MG/DL (ref 8–23)
CALCIUM SERPL-MCNC: 8.1 MG/DL (ref 8.7–10.5)
CANCER AG19-9 SERPL-ACNC: 13.6 U/ML (ref 0–40)
CHLORIDE SERPL-SCNC: 106 MMOL/L (ref 95–110)
CO2 SERPL-SCNC: 22 MMOL/L (ref 23–29)
CREAT SERPL-MCNC: 1 MG/DL (ref 0.5–1.4)
DIFFERENTIAL METHOD BLD: ABNORMAL
EOSINOPHIL # BLD AUTO: 0.1 K/UL (ref 0–0.5)
EOSINOPHIL NFR BLD: 2 % (ref 0–8)
ERYTHROCYTE [DISTWIDTH] IN BLOOD BY AUTOMATED COUNT: 17.4 % (ref 11.5–14.5)
EST. GFR  (NO RACE VARIABLE): >60 ML/MIN/1.73 M^2
FERRITIN SERPL-MCNC: 153 NG/ML (ref 20–300)
GLUCOSE SERPL-MCNC: 91 MG/DL (ref 70–110)
HCT VFR BLD AUTO: 34 % (ref 40–54)
HGB BLD-MCNC: 10.5 G/DL (ref 14–18)
IMM GRANULOCYTES # BLD AUTO: 0.03 K/UL (ref 0–0.04)
IMM GRANULOCYTES NFR BLD AUTO: 0.4 % (ref 0–0.5)
IRON SERPL-MCNC: 43 UG/DL (ref 45–160)
LYMPHOCYTES # BLD AUTO: 1.3 K/UL (ref 1–4.8)
LYMPHOCYTES NFR BLD: 19.2 % (ref 18–48)
MCH RBC QN AUTO: 27.4 PG (ref 27–31)
MCHC RBC AUTO-ENTMCNC: 30.9 G/DL (ref 32–36)
MCV RBC AUTO: 89 FL (ref 82–98)
MONOCYTES # BLD AUTO: 0.7 K/UL (ref 0.3–1)
MONOCYTES NFR BLD: 9.5 % (ref 4–15)
NEUTROPHILS # BLD AUTO: 4.6 K/UL (ref 1.8–7.7)
NEUTROPHILS NFR BLD: 67.9 % (ref 38–73)
NRBC BLD-RTO: 0 /100 WBC
PLATELET # BLD AUTO: 178 K/UL (ref 150–450)
PMV BLD AUTO: 9.8 FL (ref 9.2–12.9)
POTASSIUM SERPL-SCNC: 4.7 MMOL/L (ref 3.5–5.1)
PROT SERPL-MCNC: 6.5 G/DL (ref 6–8.4)
RBC # BLD AUTO: 3.83 M/UL (ref 4.6–6.2)
SATURATED IRON: 15 % (ref 20–50)
SODIUM SERPL-SCNC: 137 MMOL/L (ref 136–145)
TOTAL IRON BINDING CAPACITY: 295 UG/DL (ref 250–450)
TRANSFERRIN SERPL-MCNC: 199 MG/DL (ref 200–375)
WBC # BLD AUTO: 6.83 K/UL (ref 3.9–12.7)

## 2025-03-21 PROCEDURE — 80053 COMPREHEN METABOLIC PANEL: CPT | Mod: PN | Performed by: INTERNAL MEDICINE

## 2025-03-21 PROCEDURE — 99213 OFFICE O/P EST LOW 20 MIN: CPT | Mod: PBBFAC,PN | Performed by: INTERNAL MEDICINE

## 2025-03-21 PROCEDURE — 85025 COMPLETE CBC W/AUTO DIFF WBC: CPT | Mod: PN | Performed by: INTERNAL MEDICINE

## 2025-03-21 PROCEDURE — 82728 ASSAY OF FERRITIN: CPT | Performed by: INTERNAL MEDICINE

## 2025-03-21 PROCEDURE — 99999 PR PBB SHADOW E&M-EST. PATIENT-LVL III: CPT | Mod: PBBFAC,,, | Performed by: INTERNAL MEDICINE

## 2025-03-21 PROCEDURE — 86301 IMMUNOASSAY TUMOR CA 19-9: CPT | Performed by: INTERNAL MEDICINE

## 2025-03-21 PROCEDURE — 99214 OFFICE O/P EST MOD 30 MIN: CPT | Mod: S$PBB,,, | Performed by: INTERNAL MEDICINE

## 2025-03-21 PROCEDURE — 36415 COLL VENOUS BLD VENIPUNCTURE: CPT | Mod: PN | Performed by: INTERNAL MEDICINE

## 2025-03-21 PROCEDURE — 83540 ASSAY OF IRON: CPT | Performed by: INTERNAL MEDICINE

## 2025-03-31 NOTE — H&P (VIEW-ONLY)
Ahmet is a 70yo M with what is essentially an incidental diagnosis of pancreatic tail adenocarcinoma. He has hx cholecystectomy 7 years ago but presented recently with abdominal pain and choledocholithiasis. This was treated by EUS/ERCP effectively, but on this w/u thorough EUS demonstrated an incidental pancreatic tail mass which was biopsied and consistent with adenocarcinoma. He has recovered well from his ERCP.     No fam hx pancreaticobiliary cancer  Chronic opioid use due to back pain for 20+ years with secondary constipation     CT C/A/P 9/19/2024:  IMPRESSION     There is hyper dense appearance of the very tip of the tail the pancreas questioning high density mass.  May represent hemorrhage in this patient with history of recent biopsy.  Large amount of fat stranding and fluid around the tail the pancreas may be postoperative, pancreatitis, or neoplastic spread in this patient with history of pancreatic cancer.  Biliary duct dilatation with faint density within common duct.  Additional findings as detailed above including diffuse fatty infiltration of the liver.  Diverticulosis without CT findings of acute diverticulitis                EUS 9/2024:  Impression:            - Limited views of the esophagus, stomach, and                          duodenum were normal.                          - Four stones were visualized endosonographically                          in the common bile duct.                          - A mass was identified in the pancreatic tail.                          Fine needle biopsy performed.                          - A cystic lesion was seen in the pancreatic body.                          The pancreatic parenchyma was normal. The                          pancreatic duct was traced from the papilla to the                          tail of the pancreas and appeared normal. It                          measured 2 mm in diameter in the head of the                          pancreas and tapered  distally.                          The gallbladder was not visualized.                          Limited views of the left lobe of the liver                          appeared normal.                          Limited views of the abdominal aorta, portal vein,                          celiac artery, and splenic vessels appeared normal.                          There was no intra-abdominal lymphadenopathy.                          The mediastinum was unremarkable. There was no                          mediastinal lymphadenopathy. Vascular structures                          all appeared normal.                          The esophagus, stomach, and duodenal stations were                          all viewed.              Path:  PANCREATIC TAIL, MASS, FINE NEEDLE ASPIRATE BIOPSY (CELL BLOCK):     - ADENOCARCINOMA._______________________________________      ERCP 9/20/2024:  Impression:            - The entire main bile duct was severely dilated,                          with a stone causing an obstruction.                          - The patient has had a cholecystectomy.                          - Choledocholithiasis was found.                          - Ampullary dilation and biliary sphincterotomy                          was performed.                          - The biliary tree was swept.      CA 19-9: 16.9 (wnl)     A/P     70yo M with incidental finding of a pancreatic tail lesion discovered on w/u for benign choledocholithiasis. He is set to see Dr. Suero this week as well. We discussed the diagnosis of pancreatic adenocarcinoma and the treatment which includes systemic therapy and surgical resection. He has a good bit of stranding almost like a localize pancreatitis on his CT, but no evidence of metastatic disease in the chest or liver. I would favor a short(mariaelena) course of systemic therapy ie 4 cycles followed by restaging and distal pancreatectomy here. I suspect (and hope) that the stranding around the TOP is  inflammatory and will resolve with some systemic therapy and time.  --------------------------------------  4/2/2025:  Ahmet returns after 4 cycles of neoadjuvant intent FOLFIRINOX.    CT 3/17/2025:  Impression:     1. No evidence of metastatic disease identified.  2. Mild fatty infiltration remains in the liver.  3. Fatty involution of the pancreas is identified.  No definite pancreatic masses are seen.  4. Small hiatal hernia is noted.  5. Mild constipation is noted.  Adherent fecal material within the sigmoid colon makes evaluation for polypoid lesions not possible.  Noninflamed sigmoid diverticula are noted.  6. Small right inguinal hernia is seen.  7. Small hypodensity consistent with a cyst is unchanged.  Small solid nodule which is partially exophytic along the medial aspect of the spleen is unchanged.    Chest:  Impression:     1. Mild ground-glass opacity seen on the prior study have resolved.  Minimal dependent atelectatic changes are noted on today's examination.  No discrete masses or consolidations are seen.  2. Mild emphysematous changes are again identified.  3. Mildly prominent right hilar lymph node is stable in comparison to prior studies.  No ana laura adenopathy is seen.  4. Pneumobilia is again seen in the visualized portions of the liver.      CA 19-9: 13.6 (remained wnl)    On my review he continues to have a small resectable lesion, with no obviously progression or metastatic disease. We discussed the role for surgical resection here to include distal pancreatectomy, splenectomy and associated node dissection as well as the relevant risks, most pertinent being pancreatic tail leak, bleeding, and hematologic consequences of splenectomy.    POC:  Robotic dps  Vaccines    I spent 30 minutes with Mr. Benjamin, with >50% of time spent face to face and additional time preparing to see the patient (eg, review of tests), obtaining and/or reviewing separately obtained history, documenting clinical  information in the electronic or other health record, independently interpreting results (not separately reported) and communicating results to the patient/family/caregiver, or Care coordination (not separately reported).           Matthew Butler MD  Upper GI / Hepatobiliary Surgical Oncology  [facility] [place], [place]  Office: [phone]  Fax: [phone]

## 2025-03-31 NOTE — PROGRESS NOTES
Ahmet is a 70yo M with what is essentially an incidental diagnosis of pancreatic tail adenocarcinoma. He has hx cholecystectomy 7 years ago but presented recently with abdominal pain and choledocholithiasis. This was treated by EUS/ERCP effectively, but on this w/u thorough EUS demonstrated an incidental pancreatic tail mass which was biopsied and consistent with adenocarcinoma. He has recovered well from his ERCP.     No fam hx pancreaticobiliary cancer  Chronic opioid use due to back pain for 20+ years with secondary constipation     CT C/A/P 9/19/2024:  IMPRESSION     There is hyper dense appearance of the very tip of the tail the pancreas questioning high density mass.  May represent hemorrhage in this patient with history of recent biopsy.  Large amount of fat stranding and fluid around the tail the pancreas may be postoperative, pancreatitis, or neoplastic spread in this patient with history of pancreatic cancer.  Biliary duct dilatation with faint density within common duct.  Additional findings as detailed above including diffuse fatty infiltration of the liver.  Diverticulosis without CT findings of acute diverticulitis                EUS 9/2024:  Impression:            - Limited views of the esophagus, stomach, and                          duodenum were normal.                          - Four stones were visualized endosonographically                          in the common bile duct.                          - A mass was identified in the pancreatic tail.                          Fine needle biopsy performed.                          - A cystic lesion was seen in the pancreatic body.                          The pancreatic parenchyma was normal. The                          pancreatic duct was traced from the papilla to the                          tail of the pancreas and appeared normal. It                          measured 2 mm in diameter in the head of the                          pancreas and tapered  distally.                          The gallbladder was not visualized.                          Limited views of the left lobe of the liver                          appeared normal.                          Limited views of the abdominal aorta, portal vein,                          celiac artery, and splenic vessels appeared normal.                          There was no intra-abdominal lymphadenopathy.                          The mediastinum was unremarkable. There was no                          mediastinal lymphadenopathy. Vascular structures                          all appeared normal.                          The esophagus, stomach, and duodenal stations were                          all viewed.              Path:  PANCREATIC TAIL, MASS, FINE NEEDLE ASPIRATE BIOPSY (CELL BLOCK):     - ADENOCARCINOMA._______________________________________      ERCP 9/20/2024:  Impression:            - The entire main bile duct was severely dilated,                          with a stone causing an obstruction.                          - The patient has had a cholecystectomy.                          - Choledocholithiasis was found.                          - Ampullary dilation and biliary sphincterotomy                          was performed.                          - The biliary tree was swept.      CA 19-9: 16.9 (wnl)     A/P     68yo M with incidental finding of a pancreatic tail lesion discovered on w/u for benign choledocholithiasis. He is set to see Dr. Suero this week as well. We discussed the diagnosis of pancreatic adenocarcinoma and the treatment which includes systemic therapy and surgical resection. He has a good bit of stranding almost like a localize pancreatitis on his CT, but no evidence of metastatic disease in the chest or liver. I would favor a short(mariaelena) course of systemic therapy ie 4 cycles followed by restaging and distal pancreatectomy here. I suspect (and hope) that the stranding around the TOP is  inflammatory and will resolve with some systemic therapy and time.  --------------------------------------  4/2/2025:  Ahmet returns after 4 cycles of neoadjuvant intent FOLFIRINOX.    CT 3/17/2025:  Impression:     1. No evidence of metastatic disease identified.  2. Mild fatty infiltration remains in the liver.  3. Fatty involution of the pancreas is identified.  No definite pancreatic masses are seen.  4. Small hiatal hernia is noted.  5. Mild constipation is noted.  Adherent fecal material within the sigmoid colon makes evaluation for polypoid lesions not possible.  Noninflamed sigmoid diverticula are noted.  6. Small right inguinal hernia is seen.  7. Small hypodensity consistent with a cyst is unchanged.  Small solid nodule which is partially exophytic along the medial aspect of the spleen is unchanged.    Chest:  Impression:     1. Mild ground-glass opacity seen on the prior study have resolved.  Minimal dependent atelectatic changes are noted on today's examination.  No discrete masses or consolidations are seen.  2. Mild emphysematous changes are again identified.  3. Mildly prominent right hilar lymph node is stable in comparison to prior studies.  No ana laura adenopathy is seen.  4. Pneumobilia is again seen in the visualized portions of the liver.      CA 19-9: 13.6 (remained wnl)    On my review he continues to have a small resectable lesion, with no obviously progression or metastatic disease. We discussed the role for surgical resection here to include distal pancreatectomy, splenectomy and associated node dissection as well as the relevant risks, most pertinent being pancreatic tail leak, bleeding, and hematologic consequences of splenectomy.    POC:  Robotic dps  Vaccines    I spent 30 minutes with Mr. Benjamin, with >50% of time spent face to face and additional time preparing to see the patient (eg, review of tests), obtaining and/or reviewing separately obtained history, documenting clinical  information in the electronic or other health record, independently interpreting results (not separately reported) and communicating results to the patient/family/caregiver, or Care coordination (not separately reported).           Matthew Butler MD  Upper GI / Hepatobiliary Surgical Oncology  [facility] [place], [place]  Office: [phone]  Fax: [phone]

## 2025-04-01 NOTE — PROGRESS NOTES
PATIENT: Fred Benjamin  MRN: [id number]  DATE: 4/2/2025      Diagnosis:   1. Malignant neoplasm of pancreas, unspecified location of malignancy    2. Microcytic anemia    3. Pulmonary nodules    4. Paroxysmal atrial fibrillation    5. Essential hypertension    6. Normocytic anemia        Chief Complaint: Malignant neoplasm of pancreas, unspecified location of mal      Oncologic History:      Oncologic History     Oncologic Treatment     Pathology           Subjective:    Interval History:  Mr. Benjamin is a 70 y.o. male with Arthritis, A-fib, GERD, HTN, BHUMIKA who presents for pancreatic cancer.  Since the last clinic visit the patient states he has been feeling well.  The patient denies CP, cough, SOB, abdominal pain, nausea, vomiting, constipation, diarrhea.  The patient denies fever, chills, night sweats, weight loss, new lumps or bumps, easy bruising or bleeding.    Prior History:   Pt initially underwetn US abdomen 8/02/24 for work up of abdominal pain.  US showed mild intrahepatic ductal dilatation.  PT underwent EUS on 9/13/24 showing for stones in the common bile duct; mass in the pancreatic tail which was biopsied; cystic lesion in the pancreatic body.  Biopsy returned positive for adenocarcinoma.  The patient was discussed at tumor board on 09/18/2024 with recommendation for referral to Surgical Oncology and Medical Oncology.  The patient underwent CT chest, abdomen, and pelvis on 09/1924 showing a small pleural plaque of platelike atelectasis anteriorly in the right hemithorax; intra and extrahepatic biliary duct dilatation; faint density within the common duct which could represent faintly calcified stones, sludge, or debris; large amount of pancreatic fat stranding and fluid around the tail of the pancreas.  Patient underwent ERCP on 09/20/2024 showing dilatation of the entire main bile duct with stone causing obstruction.  Patient underwent ampullary dilatation and biliary sphincterotomy with the  biliary tree swept.  Patient met with surgical oncologist Dr. Butler on 09/25/2024 whom recommended 4 cycles chemotherapy followed by restaging and consideration for distal pancreatectomy.  The patient went to [facility] on 10/03/2024 and underwent CT chest, abdomen, and pelvis showing Variable sized pulmonary nodules predominantly within the left lung with a dominant lesion having irregular border measuring 12 mm in the next most prominent nodule measuring 7 mm; mild emphysema; bilobed fluid or 6 appearing lesion in the pancreatic tail; and chronic dilatation of the common bile duct.  The patient met with medical oncologist Dr. Olson on 10/03/2024 whom recommended 12 cycles of FOLFIRINOX and perioperative management.   The patient underwent US abdomen 1/28/25 showing an abscess in the RLQ of the abdomen.  Pt saw Dr Alicia on 1/31/25 and underwent I&D of right abdominal abscess.   The patient was admitted to the hospital from 02/14/2025 until 02/20/2025 for acute kidney injury related to volume depletion from nausea, vomiting and diarrhea.  Pt underwent CT chest on 03/17/2025 showing stable shotty lymph nodes in the right hilum measuring 1.8 x 1 cm; ground-glass opacities identified in the left lower lobe have resolved.  CT abdomen and pelvis on 03/17/2025 showed fatty involution of the pancreas; small exophytic nodule in the spleen unchanged; fecal material in the sigmoid colon.    Past Medical History:   Past Medical History:   Diagnosis Date    Anemia     Arthritis     Cancer 03/2017    Bladder    Cancer related pain     Chronic diastolic CHF (congestive heart failure)     GERD (gastroesophageal reflux disease)     Headache(784.0)     Hypertension     Pacemaker     Pancreatic cancer     chemo    Paroxysmal atrial fibrillation     Prediabetes     Rash     Sleep apnea     no cpap       Past Surgical HIstory:   Past Surgical History:   Procedure Laterality Date    ABCESS DRAINAGE  06/01/2018    I&D with irrigation  abcess left lower back    ABLATION      ATRIAL ABLATION SURGERY      x 2    BLADDER TUMOR EXCISION  03/14/2017     cysto/Cancer    CARDIAC PACEMAKER PLACEMENT      CARDIOVERSION      carpel tunnel Bilateral     CERVICAL FUSION      x3    CHOLECYSTECTOMY      COLONOSCOPY      ENDOSCOPIC ULTRASOUND OF UPPER GASTROINTESTINAL TRACT Left 09/13/2024    Procedure: ULTRASOUND, UPPER GI TRACT, ENDOSCOPIC;  Surgeon: Gerald Ochoa MD;  Location: [facility];  Service: Endoscopy;  Laterality: Left;    ERCP N/A 09/20/2024    Procedure: ERCP (ENDOSCOPIC RETROGRADE CHOLANGIOPANCREATOGRAPHY);  Surgeon: Mariano Ferreira III, MD;  Location: [facility] ENDO;  Service: Endoscopy;  Laterality: N/A;    ESOPHAGUS SURGERY      ting and then reversal    FOOT FRACTURE SURGERY      FRACTURE SURGERY      INCISION AND DRAINAGE OF ABSCESS Left 06/01/2018    Procedure: INCISION AND DRAINAGE, ABSCESS - left, lower back;  Surgeon: Irving Edouard MD;  Location: [facility];  Service: Orthopedics;  Laterality: Left;    INSERTION OF TUNNELED CENTRAL VENOUS CATHETER (CVC) WITH SUBCUTANEOUS PORT N/A 10/18/2024    Procedure: EMRFMTHCO-VQCT-C-CATH;  Surgeon: Irving Alicia MD;  Location: [facility];  Service: General;  Laterality: N/A;    KNEE ARTHROSCOPY W/ MENISCAL REPAIR Left     ROTATOR CUFF REPAIR Left        Family History:   Family History   Problem Relation Name Age of Onset    Coronary artery disease Mother      Diabetes Mother      Cancer Father          Lymphoma       Social History:  reports that he has quit smoking. His smoking use included cigars and cigarettes. He has a 20 pack-year smoking history. He has been exposed to tobacco smoke. He has never used smokeless tobacco. He reports that he does not drink alcohol and does not use drugs.    Allergies:  Review of patient's allergies indicates:   Allergen Reactions    Cleocin [clindamycin hcl] Shortness Of Breath     Constipation    Amiodarone     Amiodarone analogues      Affected thyroid     Dabigatran etexilate     Rythmol [propafenone]      Weight loss/ nausea       Medications:  Current Outpatient Medications   Medication Sig Dispense Refill    amLODIPine (NORVASC) 10 MG tablet Take 10 mg by mouth every evening.      ammonium lactate 12 % Crea Apply 1 Application topically As instructed (1-2 times a day to whole body for dry skin).      ELIQUIS 5 mg Tab Take 5 mg by mouth 2 (two) times daily.       morphine (MS CONTIN) 60 MG 12 hr tablet Take 60 mg by mouth 3 (three) times daily.      naphazoline-pheniramine 0.025-0.3% (NAPHCON-A) 0.025-0.3 % ophthalmic solution Place 1-2 drops into both eyes daily as needed (for allergies/dry eyes).      oxyCODONE (ROXICODONE) 30 MG Tab Take 30 mg by mouth 4 (four) times daily as needed (for pain).      sotaloL (BETAPACE) 160 MG Tab Take 1 tablet (160 mg total) by mouth once daily. 30 tablet 11     Current Facility-Administered Medications   Medication Dose Route Frequency Provider Last Rate Last Admin    haemophilus B polysac-tetanus toxoid injection 0.5 mL  0.5 mL Intramuscular 1 time in Clinic/HOD Matthew Butler MD        mening vac A,C,Y,W135 dip (PF) (MENVEO) 10-5 mcg/0.5 mL vaccine (PREFERRED)(10 - 56 YO) 0.5 mL  0.5 mL Intramuscular Q8 weeks Matthew Butler MD        meningococcal group B vaccine (PF) injection 0.5 mL  0.5 mL Intramuscular 1 time in Clinic/HOD Matthew Butler MD        pneumoc 20-yeimy conj-dip cr(PF) (PREVNAR-20 (PF)) injection Syrg 0.5 mL  0.5 mL Intramuscular 1 time in Clinic/HOD Matthew Butler MD        Tdap vaccine injection 0.5 mL  0.5 mL Intramuscular 1 time in Clinic/HOD Matthew Butler MD           Review of Systems   Constitutional:  Negative for chills, diaphoresis, fatigue, fever and unexpected weight change.   Respiratory:  Negative for cough and shortness of breath.    Cardiovascular:  Negative for chest pain and palpitations.   Gastrointestinal:  Negative for abdominal pain, constipation, diarrhea, nausea and  vomiting.   Skin:  Negative for color change and rash.   Neurological:  Negative for headaches.   Hematological:  Negative for adenopathy. Does not bruise/bleed easily.       ECOG Performance Status: 1   Objective:      Vitals:   Vitals:    04/02/25 1137   BP: 130/80   BP Location: Right arm   Patient Position: Sitting   Pulse: (!) 52   Resp: 16   Temp: 97.2 °F (36.2 °C)   TempSrc: Temporal   SpO2: 99%   Weight: 83.9 kg (184 lb 15.5 oz)   Height: 6' (1.829 m)                   Physical Exam  Constitutional:       General: He is not in acute distress.     Appearance: He is well-developed. He is not diaphoretic.   HENT:      Head: Normocephalic and atraumatic.   Cardiovascular:      Rate and Rhythm: Normal rate and regular rhythm.      Heart sounds: Normal heart sounds. No murmur heard.     No friction rub. No gallop.   Pulmonary:      Effort: Pulmonary effort is normal. No respiratory distress.      Breath sounds: Normal breath sounds. No wheezing or rales.   Chest:      Chest wall: No tenderness.   Abdominal:      General: Bowel sounds are normal. There is no distension.      Palpations: Abdomen is soft. There is no mass.      Tenderness: There is no abdominal tenderness. There is no rebound.   Musculoskeletal:      Cervical back: Normal range of motion.   Lymphadenopathy:      Cervical: No cervical adenopathy.      Upper Body:      Right upper body: No supraclavicular or axillary adenopathy.      Left upper body: No supraclavicular or axillary adenopathy.   Skin:     General: Skin is warm and dry.      Findings: No erythema or rash.   Neurological:      Mental Status: He is alert and oriented to person, place, and time.   Psychiatric:         Behavior: Behavior normal.         Laboratory Data:  No visits with results within 1 Week(s) from this visit.   Latest known visit with results is:   Lab Visit on 03/21/2025   Component Date Value Ref Range Status    WBC 03/21/2025 6.83  3.90 - 12.70 K/uL Final    RBC  03/21/2025 3.83 (L)  4.60 - 6.20 M/uL Final    Hemoglobin 03/21/2025 10.5 (L)  14.0 - 18.0 g/dL Final    Hematocrit 03/21/2025 34.0 (L)  40.0 - 54.0 % Final    MCV 03/21/2025 89  82 - 98 fL Final    MCH 03/21/2025 27.4  27.0 - 31.0 pg Final    MCHC 03/21/2025 30.9 (L)  32.0 - 36.0 g/dL Final    RDW 03/21/2025 17.4 (H)  11.5 - 14.5 % Final    Platelets 03/21/2025 178  150 - 450 K/uL Final    MPV 03/21/2025 9.8  9.2 - 12.9 fL Final    Immature Granulocytes 03/21/2025 0.4  0.0 - 0.5 % Final    Gran # (ANC) 03/21/2025 4.6  1.8 - 7.7 K/uL Final    Immature Grans (Abs) 03/21/2025 0.03  0.00 - 0.04 K/uL Final    Comment: Mild elevation in immature granulocytes is non specific and   can be seen in a variety of conditions including stress response,   acute inflammation, trauma and pregnancy. Correlation with other   laboratory and clinical findings is essential.      Lymph # 03/21/2025 1.3  1.0 - 4.8 K/uL Final    Mono # 03/21/2025 0.7  0.3 - 1.0 K/uL Final    Eos # 03/21/2025 0.1  0.0 - 0.5 K/uL Final    Baso # 03/21/2025 0.07  0.00 - 0.20 K/uL Final    nRBC 03/21/2025 0  0 /100 WBC Final    Gran % 03/21/2025 67.9  38.0 - 73.0 % Final    Lymph % 03/21/2025 19.2  18.0 - 48.0 % Final    Mono % 03/21/2025 9.5  4.0 - 15.0 % Final    Eosinophil % 03/21/2025 2.0  0.0 - 8.0 % Final    Basophil % 03/21/2025 1.0  0.0 - 1.9 % Final    Differential Method 03/21/2025 Automated   Final    Sodium 03/21/2025 137  136 - 145 mmol/L Final    Potassium 03/21/2025 4.7  3.5 - 5.1 mmol/L Final    Chloride 03/21/2025 106  95 - 110 mmol/L Final    CO2 03/21/2025 22 (L)  23 - 29 mmol/L Final    Glucose 03/21/2025 91  70 - 110 mg/dL Final    BUN 03/21/2025 13  8 - 23 mg/dL Final    Creatinine 03/21/2025 1.0  0.5 - 1.4 mg/dL Final    Calcium 03/21/2025 8.1 (L)  8.7 - 10.5 mg/dL Final    Total Protein 03/21/2025 6.5  6.0 - 8.4 g/dL Final    Albumin 03/21/2025 3.0 (L)  3.5 - 5.2 g/dL Final    Total Bilirubin 03/21/2025 0.3  0.1 - 1.0 mg/dL Final     Comment: For infants and newborns, interpretation of results should be based  on gestational age, weight and in agreement with clinical  observations.    Premature Infant recommended reference ranges:  Up to 24 hours.............<8.0 mg/dL  Up to 48 hours............<12.0 mg/dL  3-5 days..................<15.0 mg/dL  6-29 days.................<15.0 mg/dL      Alkaline Phosphatase 03/21/2025 102  40 - 150 U/L Final    AST 03/21/2025 16  10 - 40 U/L Final    ALT 03/21/2025 10  10 - 44 U/L Final    eGFR 03/21/2025 >60.0  >60 mL/min/1.73 m^2 Final    Anion Gap 03/21/2025 9  8 - 16 mmol/L Final    CA 19-9 03/21/2025 13.6  0.0 - 40.0 U/mL Final    Comment: The testing method is a chemiluminescent microparticle immunoassay   manufactured by [employer] and performed on the    or   [employer] system. Values obtained with different assay manufacturers   for   methods may be different and cannot be used interchangeably.      Ferritin 03/21/2025 153  20.0 - 300.0 ng/mL Final    Iron 03/21/2025 43 (L)  45 - 160 ug/dL Final    Transferrin 03/21/2025 199 (L)  200 - 375 mg/dL Final    TIBC 03/21/2025 295  250 - 450 ug/dL Final    Saturated Iron 03/21/2025 15 (L)  20 - 50 % Final         Imaging:     CT chest 3/17/25  Base of Neck: No significant abnormality. The visualized portions of the thyroid lobes appear unremarkable.     Thoracic soft tissues: Normal. Infusion catheter remains in the right chest with the tip of the catheter identified in the superior vena cava. Dual lead pacemaker remains on the left.     Aorta: Left-sided aortic arch. No aneurysm and no significant atherosclerosis     Heart: Normal size. No effusion. Mild-to-moderate coronary vascular calcifications are seen.     Pulmonary vasculature: Pulmonary arteries distribute normally. No large intraluminal filling defects are identified. There are four pulmonary veins.     Carolin/Mediastinum: No mediastinal or left hilar adenopathy identified. Shotty  lymph node is seen in the right hilum measuring 1.8 x 1.0 cm, not significantly changed in comparison to prior study dated September 19, 2024.     Airways: Patent.     Lungs/Pleura: Emphysematous changes are again identified in both lung fields. Ground-glass opacities identified in the left lower lobe on the prior study have resolved. Minimal atelectatic banding is identified bilaterally. No airspace consolidations are seen. Minimal scarring and or atelectatic banding is identified in the lingula.     Esophagus: Normal.     Upper Abdomen: Pneumobilia is again identified in the liver, similar to that noted on the prior study. Postoperative changes consistent with prior cholecystectomy are noted. Prominence of the common bile duct is seen, stable. The adrenal glands appear unremarkable. No upper abdominal adenopathy is seen.     Bones: No acute fracture. No suspicious lytic or sclerotic lesions.    CT Abdomen and Pelvis 3/17/2/5  The visualized portions of the lung bases demonstrate no abnormalities. No discrete masses are identified. No consolidations are noted. No effusions are seen. The heart size is within normal limits. Pacemaker is in place. No pericardial effusion is noted.     In the abdomen, the liver is normal in size. Mild decreased attenuation is again seen consistent with fatty infiltration. No discrete hepatic masses are identified. Pneumobilia is again seen. The portal and hepatic veins appear unremarkable. The gallbladder is surgically absent. There is mild prominence of the common bile duct, unchanged in comparison to the prior study and consistent with the post cholecystectomy status. Fatty involution of the pancreas is identified. No discrete pancreatic masses are seen. No pancreatic ductal dilatation is identified. No peripancreatic inflammatory changes are seen. Small exophytic nodule is identified in the spleen along the medial aspect unchanged. Small hypodensity within the spleen likely  represents a small cyst is unchanged. No new splenic nodules are noted. The adrenal glands appear unremarkable. The kidneys concentrate contrast satisfactorily without masses or hydronephrosis. No renal calculi are identified. The ureters appear unremarkable. The aorta tapers normally. The mesenteric vessels appear widely patent. Single left renal artery and main renal artery and accessory renal artery to the right kidney appear widely patent. No retroperitoneal or mesenteric adenopathy identified.     Review of the bowel demonstrates a small hiatal hernia. The stomach is otherwise unremarkable. Small bowel is normal in caliber throughout. No mucosal thickening or discrete masses are identified. The appendix is likely surgically absent. No pericolonic inflammatory changes or discrete colonic masses are identified. Noninflamed sigmoid diverticula are noted. No obstruction is seen. Adherent fecal material is identified in the sigmoid colon which makes evaluation for polypoid mass is not possible. No free air or free fluid are identified. Postoperative changes consistent with prior hernia repair with mesh are identified at the level of the umbilicus.     In the pelvis, small fat containing inguinal hernia is seen. The bladder appears unremarkable. Prostate is within normal limits. No pelvic masses or pelvic adenopathy identified.     Review of the bony structures demonstrates degenerative changes in the spine. No bony destructive lesions are identified to suggest osseous metastatic disease.       Assessment:       1. Malignant neoplasm of pancreas, unspecified location of malignancy    2. Microcytic anemia    3. Pulmonary nodules    4. Paroxysmal atrial fibrillation    5. Essential hypertension    6. Normocytic anemia                       Plan:     Adenocarcinoma of the Pancreas - patient with adenocarcinoma in the tail of the pancreas status post biopsy 09/13/2024  -CT chest, abdomen, and pelvis on 10/03/2024 showed  a lesion in the tail of the pancreas as well as multiple pulmonary nodules with 1 nodule measuring at least 12 mm  -Will obtain PET-CT  -Pt seen by Dr Butler in surgical oncology at [facility] 09/25/2024 whom recommended 4 cycles of chemo followed by restaging  -Patient seen by  at [facility] on 10/03/2024 recommending perioperative FOLFIRINOX with elimination of bolus fluorouracil and leucovorin  -Pharmacogenomic showed the patient to have normal UGT1A1 and DPYD genes  -Invitae germline testing negative  -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   Nodule in left hip felt to be trochanteric bursitis  -Irinotecan reduced to 165mg/mm2 and Fluorouracil by 20% due to prior diarrhea and side effects  -PT completed 4 cycles of FOLFIRINOX  -CT C/A/P 3/17/25 showed stable lesion in the tail of the pancreas  -Plan for distal pancreatectomy 4/22/25 under the care of Dr Butler  -Pt instructed on the recommendation for additional chemotherapy after surgery  -Pt to follow up 2 weeks after surgery  Visit today included increased complexity associated with the care of the episodic problem (chemotherapy) addressed and managing the longitudinal care of the patient due to the serious and/or complex managed problem(s) pancreatic cancer.    Pulmonary Nodules - Seen on Ct chest 10/03/24 with largest nodule in left lung  -PET/CT 10/17/24 showed no concerning nodules  -Stable on CT 3/17/25  -Will monitor    A-fib - pt on eliquis and sotalol  -Cardiology managing  -Will monitor    HTN - pt on amlodipine, sotalol  -BP ustable  -Will monitor    B12 Deficiency - low on 8/02/24 at 195pg/mL  -Pt on B12 injection monthly  -Will monitor    Anemia  - due to chemo  -Hemoglobin 10.5g/dL on 3/21/25  -Stable  -Will monitor    Route Chart for Scheduling    Med Onc Chart Routing      Follow up with physician Other. Pt needs a  CBC, CMP and  the week of 5/05/25.   Follow up with KB    Infusion scheduling note    Injection scheduling note    Labs    Imaging    Pharmacy appointment    Other referrals              Treatment Plan Information   OP GI FOLFIRINOX (oxaliplatin, leucovorin, irinotecan, fluorouracil) Q2W  Stephan Suero MD   Associated diagnosis: Pancreatic cancer Stage IA cT1c, cN0, cM0 noted on 10/11/2024   Line of treatment: Neoadjuvant  Treatment Goal: Curative     Upcoming Treatment Dates - OP GI FOLFIRINOX (oxaliplatin, leucovorin, irinotecan, fluorouracil) Q2W     2/18/2025       Chemotherapy       oxaliplatin (ELOXATIN) 85 mg/m2 = 174 mg in D5W 534.8 mL chemo infusion       leucovorin calcium 360 mg/m2 = 740 mg in D5W 250 mL infusion       irinotecan (CAMPTOSAR) 165 mg/m2 = 338 mg in 0.9% NaCl 516.9 mL chemo infusion       fluorouraciL injection 740 mg       fluorouracil (Adrucil) 2,000 mg/m2 = 4,100 mg in 0.9% NaCl 100 mL chemo infusion       Antiemetics       palonosetron 0.25mg/dexAMETHasone 12mg in NS IVPB 0.25 mg 50 mL  3/4/2025       Chemotherapy       oxaliplatin (ELOXATIN) 85 mg/m2 = 174 mg in D5W 534.8 mL chemo infusion       leucovorin calcium 360 mg/m2 = 740 mg in D5W 250 mL infusion       irinotecan (CAMPTOSAR) 165 mg/m2 = 338 mg in 0.9% NaCl 516.9 mL chemo infusion       fluorouraciL injection 740 mg       fluorouracil (Adrucil) 2,000 mg/m2 = 4,100 mg in 0.9% NaCl 100 mL chemo infusion       Antiemetics       palonosetron 0.25mg/dexAMETHasone 12mg in NS IVPB 0.25 mg 50 mL  3/18/2025       Chemotherapy       oxaliplatin (ELOXATIN) 85 mg/m2 = 174 mg in D5W 534.8 mL chemo infusion       leucovorin calcium 360 mg/m2 = 740 mg in D5W 250 mL infusion       irinotecan (CAMPTOSAR) 165 mg/m2 = 338 mg in 0.9% NaCl 516.9 mL chemo infusion       fluorouraciL injection 740 mg       fluorouracil (Adrucil) 2,000 mg/m2 = 4,100 mg in 0.9% NaCl 100 mL chemo infusion       Antiemetics       palonosetron  0.25mg/dexAMETHasone 12mg in NS IVPB 0.25 mg 50 mL  4/1/2025       Chemotherapy       oxaliplatin (ELOXATIN) 85 mg/m2 = 174 mg in D5W 534.8 mL chemo infusion       leucovorin calcium 360 mg/m2 = 740 mg in D5W 250 mL infusion       irinotecan (CAMPTOSAR) 165 mg/m2 = 338 mg in 0.9% NaCl 516.9 mL chemo infusion       fluorouraciL injection 740 mg       fluorouracil (Adrucil) 2,000 mg/m2 = 4,100 mg in 0.9% NaCl 100 mL chemo infusion       Antiemetics       palonosetron 0.25mg/dexAMETHasone 12mg in NS IVPB 0.25 mg 50 mL    Supportive Plan Information  IV FLUIDS AND ELECTROLYTES Truman Gr NP   Associated Diagnosis: Dehydration   noted on 11/5/2024  Associated Diagnosis: Chemotherapy induced diarrhea   noted on 11/1/2024  Associated Diagnosis: Diarrhea   noted on 11/19/2024   Line of treatment: Supportive Care   Treatment goal: Supportive     Upcoming Treatment Dates - IV FLUIDS AND ELECTROLYTES    No upcoming days in selected categories.      Stephan Suero MD  [facility]  Hematology and Oncology  [facility]   [address] [place] [address]   O: [phone]  F: [phone]

## 2025-04-02 ENCOUNTER — OFFICE VISIT (OUTPATIENT)
Dept: HEMATOLOGY/ONCOLOGY | Facility: CLINIC | Age: 70
End: 2025-04-02
Payer: MEDICARE

## 2025-04-02 ENCOUNTER — TELEPHONE (OUTPATIENT)
Dept: INFECTIOUS DISEASES | Facility: CLINIC | Age: 70
End: 2025-04-02
Payer: MEDICARE

## 2025-04-02 VITALS
SYSTOLIC BLOOD PRESSURE: 154 MMHG | WEIGHT: 184.94 LBS | DIASTOLIC BLOOD PRESSURE: 107 MMHG | OXYGEN SATURATION: 96 % | HEART RATE: 84 BPM | BODY MASS INDEX: 25.09 KG/M2

## 2025-04-02 VITALS
TEMPERATURE: 97 F | WEIGHT: 184.94 LBS | HEART RATE: 52 BPM | SYSTOLIC BLOOD PRESSURE: 130 MMHG | OXYGEN SATURATION: 99 % | DIASTOLIC BLOOD PRESSURE: 80 MMHG | BODY MASS INDEX: 25.05 KG/M2 | HEIGHT: 72 IN | RESPIRATION RATE: 16 BRPM

## 2025-04-02 DIAGNOSIS — D64.9 NORMOCYTIC ANEMIA: ICD-10-CM

## 2025-04-02 DIAGNOSIS — C25.9 PANCREATIC ADENOCARCINOMA: Primary | ICD-10-CM

## 2025-04-02 DIAGNOSIS — C25.9 MALIGNANT NEOPLASM OF PANCREAS, UNSPECIFIED LOCATION OF MALIGNANCY: Primary | ICD-10-CM

## 2025-04-02 DIAGNOSIS — I48.0 PAROXYSMAL ATRIAL FIBRILLATION: ICD-10-CM

## 2025-04-02 DIAGNOSIS — R91.8 PULMONARY NODULES: ICD-10-CM

## 2025-04-02 DIAGNOSIS — D50.9 MICROCYTIC ANEMIA: ICD-10-CM

## 2025-04-02 DIAGNOSIS — S30.811A ABRASION OF ABDOMINAL WALL, INITIAL ENCOUNTER: ICD-10-CM

## 2025-04-02 DIAGNOSIS — I10 ESSENTIAL HYPERTENSION: ICD-10-CM

## 2025-04-02 PROCEDURE — 99213 OFFICE O/P EST LOW 20 MIN: CPT | Mod: PBBFAC,PN | Performed by: INTERNAL MEDICINE

## 2025-04-02 PROCEDURE — 99999 PR PBB SHADOW E&M-EST. PATIENT-LVL III: CPT | Mod: PBBFAC,,, | Performed by: SURGERY

## 2025-04-02 PROCEDURE — 99213 OFFICE O/P EST LOW 20 MIN: CPT | Mod: S$PBB,,, | Performed by: INTERNAL MEDICINE

## 2025-04-02 PROCEDURE — 99999 PR PBB SHADOW E&M-EST. PATIENT-LVL III: CPT | Mod: PBBFAC,,, | Performed by: INTERNAL MEDICINE

## 2025-04-02 PROCEDURE — 99213 OFFICE O/P EST LOW 20 MIN: CPT | Mod: PBBFAC,27,PN | Performed by: SURGERY

## 2025-04-02 PROCEDURE — 99214 OFFICE O/P EST MOD 30 MIN: CPT | Mod: S$PBB,,, | Performed by: SURGERY

## 2025-04-02 NOTE — TELEPHONE ENCOUNTER
Contacted patient to set up appt for vaccinations.  Patient is from [facility] and would like to get with [facility] location.  I gave him information on what vaccines are needed and that he needs them 2 weeks prior to procedure.  He will contact his PCP and see if he can get there if not he will contact me to schedule.

## 2025-04-02 NOTE — TELEPHONE ENCOUNTER
----- Message from ANALY Dyson sent at 4/2/2025 12:19 PM CDT -----  Good afternoonPt has been scheduled with Dr. Butler for distal panc/splenectomy on 4/22. Please contact pt to schedule spleen vaccines.Woo, KAYLEEN, RN

## 2025-04-03 ENCOUNTER — TELEPHONE (OUTPATIENT)
Dept: SURGERY | Facility: CLINIC | Age: 70
End: 2025-04-03
Payer: MEDICARE

## 2025-04-03 NOTE — LETTER
April 3, 2025      Dr. Royce Valentine  [facility]  Phone [phone]   Fax [phone]       [facility] - Surgery  [address] [address]  Phone: [phone]   Patient: Fred Benjamin   MR Number: [id number]   YOB: 1955   Date of Visit: 4/3/2025     Dear Dr. Valentine,     Fred Benjamin 1955 was seen by Matthew Butler MD on 4/2/2025  for surgical planning at [facility] in [place]. Fred Benjamin  has been scheduled for Robotic  Distal Pancreatectomy and Splenectomy with Dr. Butler on April 22, 2025. Per Dr. Butlre's request, please provide cardiac clearance, risk assessment, results of any cardiac testing you deem necessary prior to surgery, and instructions for holding Eliquis  prior to surgery. Please fax requested information to [phone].     If you need any additional information, please contact the office directly at [phone].      With Kind Regards,       Karis Rousseau, KAYLEEN, RN  RN Navigator   Upper GI/Hepatobiliary Surgical Oncology  [facility]  [facility]  [address] [place] [address]  [address] [place] [address]  Office:   [phone]  Fax:        [phone]

## 2025-04-03 NOTE — TELEPHONE ENCOUNTER
Call placed to Dr. Valentine office. Per automated recording, fax is [phone] . Request for clearance faxed via epic. Successful fax transmission received.

## 2025-04-03 NOTE — TELEPHONE ENCOUNTER
Call placed to pt. Spoke to pt spouse and pt. Pt's cardiologist is Royce Valentine [phone] ([facility]). Per pt, his last appt was a few months ago and he thinks he had an ECHO 6 months to 1 year ago, Will follow up with Dr. Valentine's office for clearance and notify pt is Dr. Valentine needs to see him in clinic prior to surgery. Pt and spouse verbalized understanding.

## 2025-04-03 NOTE — PROGRESS NOTES
Spoke to patient and spouse. Confirmed procedure for 4/22/2025  with Dr. Butler.   Informed to arrive at the [facility] on the 2nd floor [address] [facility] for 0930  and surgery time is 1130. RN will call  day before to confirm surgery and arrival time. Surgery Instructions provided as follows:  instructed to remain NPO solids 8 hours prior to surgery, clear liquids until 2 hours prior to surgery, to shower with hibiclens/antibacterial soap the night before surgery and morning of surgery before arrival, not to apply any lotions, powders or deodorant, remove all metal and jewelry, to wear comfortable clothes, and leave all valuables at home. Medications reviewed and  instructed to bring medications on DOS. Pre op department will call to review medications and advise if medications need to be taken day of surgery. Per Dr. Butler's orders, instructed to hold Eliquis 2 days prior to surgery. Confirmed pt  will have transportation home and spouse will accompany pt on DOS.  Post operative instructions reviewed. Tentative post op appt scheduled. Details provided. Pt provided surgery instructions including eating and drinking instructions and map , [facility] map for distal panc,  disability instructions,  and  instructed to bring surgery folder  on DOS. Pt  and spouse verbalized understanding to all of the above and instructed to contact us for any questions.     Per Dr. Butler, prehab referral not needed

## 2025-04-04 ENCOUNTER — CLINICAL SUPPORT (OUTPATIENT)
Dept: INFECTIOUS DISEASES | Facility: CLINIC | Age: 70
End: 2025-04-04
Payer: MEDICARE

## 2025-04-04 DIAGNOSIS — Z00.00 HEALTHCARE MAINTENANCE: Primary | ICD-10-CM

## 2025-04-04 NOTE — PROGRESS NOTES
Pt received 3 vaccines IM to right deltoid, Menveo to anterior, HIB to posterior, & Adacel to inferior. Pt also received 2 vaccines IM to left deltoid, Bexsero to superior and Qaroups28 to inferior. Pt tolerated injections well and departed from clinic in [facility].

## 2025-04-08 ENCOUNTER — PATIENT MESSAGE (OUTPATIENT)
Dept: RESEARCH | Facility: HOSPITAL | Age: 70
End: 2025-04-08
Payer: MEDICARE

## 2025-04-14 ENCOUNTER — TELEPHONE (OUTPATIENT)
Dept: RESEARCH | Facility: HOSPITAL | Age: 70
End: 2025-04-14
Payer: MEDICARE

## 2025-04-17 NOTE — PRE-PROCEDURE INSTRUCTIONS
PreOp Instructions given:   - Verbal medication information (what to hold and what to take)   - NPO guidelines   - Arrival place directions given; time to be given the day before procedure by the   Surgeon's Office DOSC  - Bathing with antibacterial soap   - Don't wear any jewelry or bring any valuables AM of surgery   - No makeup or moisturizer to face   - No perfume/cologne, powder, lotions or aftershave   Pt. verbalized understanding.   Pt denies any h/o Anesthesia/Sedation complications or side effects.  Patient does not know arrival time.  Explained that this information comes from the surgeon's office and if they haven't heard from them by 2 or 3 pm to call the office.  Patient stated an understanding.   Pacemaker - [employer] called - Sw - June Ball - note will be placed in chart.    Rt CVC  Lt Foot Fx - Boot

## 2025-04-21 ENCOUNTER — TELEPHONE (OUTPATIENT)
Dept: SURGERY | Facility: CLINIC | Age: 70
End: 2025-04-21
Payer: MEDICARE

## 2025-04-21 ENCOUNTER — ANESTHESIA EVENT (OUTPATIENT)
Dept: SURGERY | Facility: HOSPITAL | Age: 70
End: 2025-04-21
Payer: MEDICARE

## 2025-04-21 DIAGNOSIS — C25.9 PANCREATIC ADENOCARCINOMA: Primary | ICD-10-CM

## 2025-04-21 RX ORDER — CELECOXIB 200 MG/1
400 CAPSULE ORAL
Status: CANCELLED | OUTPATIENT
Start: 2025-04-21

## 2025-04-21 RX ORDER — ACETAMINOPHEN 500 MG
1000 TABLET ORAL
Status: CANCELLED | OUTPATIENT
Start: 2025-04-21

## 2025-04-21 RX ORDER — HEPARIN SODIUM 5000 [USP'U]/ML
5000 INJECTION, SOLUTION INTRAVENOUS; SUBCUTANEOUS
Status: CANCELLED | OUTPATIENT
Start: 2025-04-21

## 2025-04-21 RX ORDER — METRONIDAZOLE 500 MG/100ML
500 INJECTION, SOLUTION INTRAVENOUS
Status: CANCELLED | OUTPATIENT
Start: 2025-04-21

## 2025-04-21 NOTE — PROGRESS NOTES
Chart reviewed including recent labs, medications, pertinent recent visits. Surgery orders placed.

## 2025-04-21 NOTE — TELEPHONE ENCOUNTER
"Spoke to patient. Confirmed procedure for 4/22/2025 with Dr. Butler.   Informed to arrive at the [facility] Surgery Center on the 2nd floor [address] [facility] [address] for 0930  and surgery time is 1130. Surgery Instructions provided as follows:  instructed to remain NPO solids 8 hours prior to surgery, clear liquids until 2 hours prior to surgery, to shower with hibiclens/antibacterial soap the night before surgery and morning of surgery before arrival, not to apply any lotions, powders or deodorant, remove all metal and jewelry, to wear comfortable clothes, and leave all valuables at home. Medications reviewed and  instructed to bring medications on DOS. Pt reports last dose of Eliquis was Friday pm. Ppt confirmed he spoke to PAT nurse. Confirmed pt  will have transportation home and spouse  will accompany pt on DOS.  Post operative instructions reviewed. Post op appt details reviewed.  Pt reports he "had an acne breakout on neck and head since he received spleen vaccines". Denies fever, nausea, vomiting, diarrhea, and drainage from sites. Pt states they are all healed and scabbed over. Pt instructed to bring surgery folder  on DOS. Pt  verbalized understanding to all of the above and instructed to contact us for any questions.       " U/s scheduled 07/08/21 @[facility]  Gastric Emptying scheduled 08/06/21 @[facility]

## 2025-04-22 ENCOUNTER — DOCUMENTATION ONLY (OUTPATIENT)
Dept: ELECTROPHYSIOLOGY | Facility: CLINIC | Age: 70
End: 2025-04-22
Payer: MEDICARE

## 2025-04-22 ENCOUNTER — ANESTHESIA (OUTPATIENT)
Dept: SURGERY | Facility: HOSPITAL | Age: 70
End: 2025-04-22
Payer: MEDICARE

## 2025-04-22 ENCOUNTER — HOSPITAL ENCOUNTER (INPATIENT)
Facility: HOSPITAL | Age: 70
LOS: 3 days | Discharge: HOME | DRG: 406 | End: 2025-04-25
Attending: SURGERY | Admitting: SURGERY
Payer: MEDICARE

## 2025-04-22 DIAGNOSIS — I48.91 ATRIAL FIBRILLATION: ICD-10-CM

## 2025-04-22 DIAGNOSIS — R94.31 T WAVE INVERSION IN EKG: ICD-10-CM

## 2025-04-22 DIAGNOSIS — C25.9 MALIGNANT NEOPLASM OF PANCREAS, UNSPECIFIED LOCATION OF MALIGNANCY: ICD-10-CM

## 2025-04-22 DIAGNOSIS — C25.9 PANCREATIC ADENOCARCINOMA: ICD-10-CM

## 2025-04-22 LAB
ABORH RETYPE: NORMAL
ABSOLUTE EOSINOPHIL (OHS): 0.07 K/UL
ABSOLUTE MONOCYTE (OHS): 0.32 K/UL (ref 0.3–1)
ABSOLUTE NEUTROPHIL COUNT (OHS): 14.01 K/UL (ref 1.8–7.7)
ALBUMIN SERPL BCP-MCNC: 3.1 G/DL (ref 3.5–5.2)
ALP SERPL-CCNC: 95 UNIT/L (ref 40–150)
ALT SERPL W/O P-5'-P-CCNC: 6 UNIT/L (ref 10–44)
ANION GAP (OHS): 9 MMOL/L (ref 8–16)
AST SERPL-CCNC: 15 UNIT/L (ref 11–45)
BASOPHILS # BLD AUTO: 0.11 K/UL
BASOPHILS NFR BLD AUTO: 0.7 %
BILIRUB SERPL-MCNC: 0.3 MG/DL (ref 0.1–1)
BUN SERPL-MCNC: 14 MG/DL (ref 8–23)
CALCIUM SERPL-MCNC: 8.6 MG/DL (ref 8.7–10.5)
CHLORIDE SERPL-SCNC: 108 MMOL/L (ref 95–110)
CO2 SERPL-SCNC: 22 MMOL/L (ref 23–29)
CREAT SERPL-MCNC: 0.8 MG/DL (ref 0.5–1.4)
ERYTHROCYTE [DISTWIDTH] IN BLOOD BY AUTOMATED COUNT: 13.8 % (ref 11.5–14.5)
GFR SERPLBLD CREATININE-BSD FMLA CKD-EPI: >60 ML/MIN/1.73/M2
GLUCOSE SERPL-MCNC: 135 MG/DL (ref 70–110)
HCT VFR BLD AUTO: 37.4 % (ref 40–54)
HGB BLD-MCNC: 11.4 GM/DL (ref 14–18)
IMM GRANULOCYTES # BLD AUTO: 0.08 K/UL (ref 0–0.04)
IMM GRANULOCYTES NFR BLD AUTO: 0.5 % (ref 0–0.5)
INDIRECT COOMBS: NORMAL
LYMPHOCYTES # BLD AUTO: 1.35 K/UL (ref 1–4.8)
MAGNESIUM SERPL-MCNC: 2.2 MG/DL (ref 1.6–2.6)
MCH RBC QN AUTO: 26.9 PG (ref 27–31)
MCHC RBC AUTO-ENTMCNC: 30.5 G/DL (ref 32–36)
MCV RBC AUTO: 88 FL (ref 82–98)
NUCLEATED RBC (/100WBC) (OHS): 0 /100 WBC
PHOSPHATE SERPL-MCNC: 4 MG/DL (ref 2.7–4.5)
PLATELET # BLD AUTO: 238 K/UL (ref 150–450)
PMV BLD AUTO: 9.5 FL (ref 9.2–12.9)
POTASSIUM SERPL-SCNC: 4.3 MMOL/L (ref 3.5–5.1)
PROT SERPL-MCNC: 6.5 GM/DL (ref 6–8.4)
RBC # BLD AUTO: 4.24 M/UL (ref 4.6–6.2)
RELATIVE EOSINOPHIL (OHS): 0.4 %
RELATIVE LYMPHOCYTE (OHS): 8.5 % (ref 18–48)
RELATIVE MONOCYTE (OHS): 2 % (ref 4–15)
RELATIVE NEUTROPHIL (OHS): 87.9 % (ref 38–73)
RH BLD: NORMAL
SODIUM SERPL-SCNC: 139 MMOL/L (ref 136–145)
WBC # BLD AUTO: 15.94 K/UL (ref 3.9–12.7)

## 2025-04-22 PROCEDURE — 71000015 HC POSTOP RECOV 1ST HR: Performed by: SURGERY

## 2025-04-22 PROCEDURE — 85025 COMPLETE CBC W/AUTO DIFF WBC: CPT | Performed by: STUDENT IN AN ORGANIZED HEALTH CARE EDUCATION/TRAINING PROGRAM

## 2025-04-22 PROCEDURE — 63600175 PHARM REV CODE 636 W HCPCS

## 2025-04-22 PROCEDURE — 94761 N-INVAS EAR/PLS OXIMETRY MLT: CPT

## 2025-04-22 PROCEDURE — 27000221 HC OXYGEN, UP TO 24 HOURS

## 2025-04-22 PROCEDURE — 86900 BLOOD TYPING SEROLOGIC ABO: CPT

## 2025-04-22 PROCEDURE — 94640 AIRWAY INHALATION TREATMENT: CPT

## 2025-04-22 PROCEDURE — 63600175 PHARM REV CODE 636 W HCPCS: Performed by: STUDENT IN AN ORGANIZED HEALTH CARE EDUCATION/TRAINING PROGRAM

## 2025-04-22 PROCEDURE — 25000242 PHARM REV CODE 250 ALT 637 W/ HCPCS: Performed by: NURSE PRACTITIONER

## 2025-04-22 PROCEDURE — 84100 ASSAY OF PHOSPHORUS: CPT | Performed by: STUDENT IN AN ORGANIZED HEALTH CARE EDUCATION/TRAINING PROGRAM

## 2025-04-22 PROCEDURE — 48999 UNLISTED PROCEDURE PANCREAS: CPT | Mod: GC,,, | Performed by: SURGERY

## 2025-04-22 PROCEDURE — 36000712 HC OR TIME LEV V 1ST 15 MIN: Performed by: SURGERY

## 2025-04-22 PROCEDURE — 37000009 HC ANESTHESIA EA ADD 15 MINS: Performed by: SURGERY

## 2025-04-22 PROCEDURE — 83735 ASSAY OF MAGNESIUM: CPT | Performed by: STUDENT IN AN ORGANIZED HEALTH CARE EDUCATION/TRAINING PROGRAM

## 2025-04-22 PROCEDURE — 25000003 PHARM REV CODE 250: Performed by: STUDENT IN AN ORGANIZED HEALTH CARE EDUCATION/TRAINING PROGRAM

## 2025-04-22 PROCEDURE — 27000646 HC AEROBIKA DEVICE

## 2025-04-22 PROCEDURE — 63600175 PHARM REV CODE 636 W HCPCS: Performed by: SURGERY

## 2025-04-22 PROCEDURE — 27201423 OPTIME MED/SURG SUP & DEVICES STERILE SUPPLY: Performed by: SURGERY

## 2025-04-22 PROCEDURE — 27201037 HC PRESSURE MONITORING SET UP

## 2025-04-22 PROCEDURE — 8E0W4CZ ROBOTIC ASSISTED PROCEDURE OF TRUNK REGION, PERCUTANEOUS ENDOSCOPIC APPROACH: ICD-10-PCS | Performed by: SURGERY

## 2025-04-22 PROCEDURE — 36620 INSERTION CATHETER ARTERY: CPT | Mod: 59,,, | Performed by: ANESTHESIOLOGY

## 2025-04-22 PROCEDURE — 25000003 PHARM REV CODE 250

## 2025-04-22 PROCEDURE — 07TP4ZZ RESECTION OF SPLEEN, PERCUTANEOUS ENDOSCOPIC APPROACH: ICD-10-PCS | Performed by: SURGERY

## 2025-04-22 PROCEDURE — 94664 DEMO&/EVAL PT USE INHALER: CPT

## 2025-04-22 PROCEDURE — 0FBG4ZZ EXCISION OF PANCREAS, PERCUTANEOUS ENDOSCOPIC APPROACH: ICD-10-PCS | Performed by: SURGERY

## 2025-04-22 PROCEDURE — 99900035 HC TECH TIME PER 15 MIN (STAT)

## 2025-04-22 PROCEDURE — 80053 COMPREHEN METABOLIC PANEL: CPT | Performed by: STUDENT IN AN ORGANIZED HEALTH CARE EDUCATION/TRAINING PROGRAM

## 2025-04-22 PROCEDURE — 93010 ELECTROCARDIOGRAM REPORT: CPT | Mod: ,,, | Performed by: INTERNAL MEDICINE

## 2025-04-22 PROCEDURE — 93005 ELECTROCARDIOGRAM TRACING: CPT

## 2025-04-22 PROCEDURE — 20600001 HC STEP DOWN PRIVATE ROOM

## 2025-04-22 PROCEDURE — 71000033 HC RECOVERY, INTIAL HOUR: Performed by: SURGERY

## 2025-04-22 PROCEDURE — 71000016 HC POSTOP RECOV ADDL HR: Performed by: SURGERY

## 2025-04-22 PROCEDURE — 37000008 HC ANESTHESIA 1ST 15 MINUTES: Performed by: SURGERY

## 2025-04-22 PROCEDURE — 36000713 HC OR TIME LEV V EA ADD 15 MIN: Performed by: SURGERY

## 2025-04-22 PROCEDURE — 88309 TISSUE EXAM BY PATHOLOGIST: CPT | Mod: TC | Performed by: SURGERY

## 2025-04-22 RX ORDER — ENOXAPARIN SODIUM 100 MG/ML
40 INJECTION SUBCUTANEOUS EVERY 24 HOURS
Status: DISCONTINUED | OUTPATIENT
Start: 2025-04-22 | End: 2025-04-25 | Stop reason: HOSPADM

## 2025-04-22 RX ORDER — SOTALOL HYDROCHLORIDE 80 MG/1
160 TABLET ORAL DAILY
Status: DISCONTINUED | OUTPATIENT
Start: 2025-04-23 | End: 2025-04-23

## 2025-04-22 RX ORDER — PHENYLEPHRINE HCL IN 0.9% NACL 1 MG/10 ML
SYRINGE (ML) INTRAVENOUS
Status: DISCONTINUED | OUTPATIENT
Start: 2025-04-22 | End: 2025-04-22

## 2025-04-22 RX ORDER — MAGNESIUM SULFATE HEPTAHYDRATE 500 MG/ML
INJECTION, SOLUTION INTRAMUSCULAR; INTRAVENOUS
Status: DISCONTINUED | OUTPATIENT
Start: 2025-04-22 | End: 2025-04-22

## 2025-04-22 RX ORDER — CEFAZOLIN 2 G/1
2 INJECTION, POWDER, FOR SOLUTION INTRAMUSCULAR; INTRAVENOUS
Status: DISCONTINUED | OUTPATIENT
Start: 2025-04-22 | End: 2025-04-22 | Stop reason: HOSPADM

## 2025-04-22 RX ORDER — DEXAMETHASONE SODIUM PHOSPHATE 4 MG/ML
INJECTION, SOLUTION INTRA-ARTICULAR; INTRALESIONAL; INTRAMUSCULAR; INTRAVENOUS; SOFT TISSUE
Status: DISCONTINUED | OUTPATIENT
Start: 2025-04-22 | End: 2025-04-22

## 2025-04-22 RX ORDER — ONDANSETRON HYDROCHLORIDE 2 MG/ML
8 INJECTION, SOLUTION INTRAVENOUS EVERY 6 HOURS PRN
Status: DISCONTINUED | OUTPATIENT
Start: 2025-04-22 | End: 2025-04-25 | Stop reason: HOSPADM

## 2025-04-22 RX ORDER — HYDRALAZINE HYDROCHLORIDE 20 MG/ML
INJECTION INTRAMUSCULAR; INTRAVENOUS
Status: COMPLETED
Start: 2025-04-22 | End: 2025-04-23

## 2025-04-22 RX ORDER — MORPHINE SULFATE 15 MG/1
15 TABLET, FILM COATED, EXTENDED RELEASE ORAL EVERY 12 HOURS
Refills: 0 | Status: DISCONTINUED | OUTPATIENT
Start: 2025-04-22 | End: 2025-04-23

## 2025-04-22 RX ORDER — CEFAZOLIN SODIUM 1 G/3ML
INJECTION, POWDER, FOR SOLUTION INTRAMUSCULAR; INTRAVENOUS
Status: DISCONTINUED | OUTPATIENT
Start: 2025-04-22 | End: 2025-04-22

## 2025-04-22 RX ORDER — GABAPENTIN 300 MG/1
300 CAPSULE ORAL 3 TIMES DAILY
Status: DISCONTINUED | OUTPATIENT
Start: 2025-04-22 | End: 2025-04-25 | Stop reason: HOSPADM

## 2025-04-22 RX ORDER — MORPHINE SULFATE 15 MG/1
15 TABLET, FILM COATED, EXTENDED RELEASE ORAL EVERY 12 HOURS
Refills: 0 | Status: DISCONTINUED | OUTPATIENT
Start: 2025-04-22 | End: 2025-04-22

## 2025-04-22 RX ORDER — FENTANYL CITRATE 50 UG/ML
INJECTION, SOLUTION INTRAMUSCULAR; INTRAVENOUS
Status: DISCONTINUED | OUTPATIENT
Start: 2025-04-22 | End: 2025-04-22

## 2025-04-22 RX ORDER — HEPARIN SODIUM 5000 [USP'U]/ML
5000 INJECTION, SOLUTION INTRAVENOUS; SUBCUTANEOUS
Status: DISCONTINUED | OUTPATIENT
Start: 2025-04-22 | End: 2025-04-22

## 2025-04-22 RX ORDER — MUPIROCIN 20 MG/G
OINTMENT TOPICAL 2 TIMES DAILY
Status: DISCONTINUED | OUTPATIENT
Start: 2025-04-22 | End: 2025-04-25 | Stop reason: HOSPADM

## 2025-04-22 RX ORDER — FENTANYL CITRATE 50 UG/ML
25 INJECTION, SOLUTION INTRAMUSCULAR; INTRAVENOUS EVERY 5 MIN PRN
Status: COMPLETED | OUTPATIENT
Start: 2025-04-22 | End: 2025-04-22

## 2025-04-22 RX ORDER — MIDAZOLAM HYDROCHLORIDE 1 MG/ML
INJECTION INTRAMUSCULAR; INTRAVENOUS
Status: DISCONTINUED | OUTPATIENT
Start: 2025-04-22 | End: 2025-04-22

## 2025-04-22 RX ORDER — ROCURONIUM BROMIDE 10 MG/ML
INJECTION, SOLUTION INTRAVENOUS
Status: DISCONTINUED | OUTPATIENT
Start: 2025-04-22 | End: 2025-04-22

## 2025-04-22 RX ORDER — LIDOCAINE HYDROCHLORIDE 20 MG/ML
INJECTION, SOLUTION EPIDURAL; INFILTRATION; INTRACAUDAL; PERINEURAL
Status: DISCONTINUED | OUTPATIENT
Start: 2025-04-22 | End: 2025-04-22

## 2025-04-22 RX ORDER — ACETAMINOPHEN 500 MG
1000 TABLET ORAL EVERY 8 HOURS
Status: DISCONTINUED | OUTPATIENT
Start: 2025-04-23 | End: 2025-04-25 | Stop reason: HOSPADM

## 2025-04-22 RX ORDER — HALOPERIDOL LACTATE 5 MG/ML
0.5 INJECTION, SOLUTION INTRAMUSCULAR EVERY 10 MIN PRN
Status: DISCONTINUED | OUTPATIENT
Start: 2025-04-22 | End: 2025-04-22 | Stop reason: HOSPADM

## 2025-04-22 RX ORDER — METHOCARBAMOL 100 MG/ML
750 INJECTION, SOLUTION INTRAMUSCULAR; INTRAVENOUS EVERY 8 HOURS
Status: COMPLETED | OUTPATIENT
Start: 2025-04-22 | End: 2025-04-25

## 2025-04-22 RX ORDER — CELECOXIB 200 MG/1
400 CAPSULE ORAL
Status: DISCONTINUED | OUTPATIENT
Start: 2025-04-22 | End: 2025-04-22 | Stop reason: HOSPADM

## 2025-04-22 RX ORDER — GLUCAGON 1 MG
1 KIT INJECTION
Status: DISCONTINUED | OUTPATIENT
Start: 2025-04-22 | End: 2025-04-22 | Stop reason: HOSPADM

## 2025-04-22 RX ORDER — ACETAMINOPHEN 500 MG
1000 TABLET ORAL EVERY 8 HOURS
Status: DISCONTINUED | OUTPATIENT
Start: 2025-04-23 | End: 2025-04-22

## 2025-04-22 RX ORDER — ACETAMINOPHEN 10 MG/ML
1000 INJECTION, SOLUTION INTRAVENOUS EVERY 8 HOURS
Status: COMPLETED | OUTPATIENT
Start: 2025-04-22 | End: 2025-04-23

## 2025-04-22 RX ORDER — KETOROLAC TROMETHAMINE 15 MG/ML
30 INJECTION, SOLUTION INTRAMUSCULAR; INTRAVENOUS EVERY 6 HOURS
Status: DISCONTINUED | OUTPATIENT
Start: 2025-04-23 | End: 2025-04-24

## 2025-04-22 RX ORDER — HYDROMORPHONE HYDROCHLORIDE 1 MG/ML
INJECTION, SOLUTION INTRAMUSCULAR; INTRAVENOUS; SUBCUTANEOUS
Status: DISCONTINUED | OUTPATIENT
Start: 2025-04-22 | End: 2025-04-22

## 2025-04-22 RX ORDER — SODIUM CHLORIDE 0.9 % (FLUSH) 0.9 %
10 SYRINGE (ML) INJECTION
Status: DISCONTINUED | OUTPATIENT
Start: 2025-04-22 | End: 2025-04-22 | Stop reason: HOSPADM

## 2025-04-22 RX ORDER — HYDROMORPHONE HYDROCHLORIDE 2 MG/ML
0.5 INJECTION, SOLUTION INTRAMUSCULAR; INTRAVENOUS; SUBCUTANEOUS EVERY 4 HOURS PRN
Refills: 0 | Status: DISCONTINUED | OUTPATIENT
Start: 2025-04-22 | End: 2025-04-23

## 2025-04-22 RX ORDER — HYDROMORPHONE HYDROCHLORIDE 1 MG/ML
0.5 INJECTION, SOLUTION INTRAMUSCULAR; INTRAVENOUS; SUBCUTANEOUS EVERY 4 HOURS PRN
Status: DISCONTINUED | OUTPATIENT
Start: 2025-04-22 | End: 2025-04-22

## 2025-04-22 RX ORDER — HYDRALAZINE HYDROCHLORIDE 20 MG/ML
10 INJECTION INTRAMUSCULAR; INTRAVENOUS EVERY 6 HOURS PRN
Status: DISCONTINUED | OUTPATIENT
Start: 2025-04-22 | End: 2025-04-23

## 2025-04-22 RX ORDER — HYDROMORPHONE HYDROCHLORIDE 1 MG/ML
0.2 INJECTION, SOLUTION INTRAMUSCULAR; INTRAVENOUS; SUBCUTANEOUS EVERY 10 MIN PRN
Status: DISCONTINUED | OUTPATIENT
Start: 2025-04-22 | End: 2025-04-22 | Stop reason: HOSPADM

## 2025-04-22 RX ORDER — HYDROMORPHONE HCL IN 0.9% NACL 6 MG/30 ML
PATIENT CONTROLLED ANALGESIA SYRINGE INTRAVENOUS CONTINUOUS
Refills: 0 | Status: DISCONTINUED | OUTPATIENT
Start: 2025-04-22 | End: 2025-04-23

## 2025-04-22 RX ORDER — NALOXONE HCL 0.4 MG/ML
0.02 VIAL (ML) INJECTION
Status: DISCONTINUED | OUTPATIENT
Start: 2025-04-22 | End: 2025-04-23

## 2025-04-22 RX ORDER — ACETAMINOPHEN 10 MG/ML
1000 INJECTION, SOLUTION INTRAVENOUS EVERY 8 HOURS
Status: DISCONTINUED | OUTPATIENT
Start: 2025-04-22 | End: 2025-04-22

## 2025-04-22 RX ORDER — PROPOFOL 10 MG/ML
VIAL (ML) INTRAVENOUS
Status: DISCONTINUED | OUTPATIENT
Start: 2025-04-22 | End: 2025-04-22

## 2025-04-22 RX ORDER — LABETALOL HCL 20 MG/4 ML
10 SYRINGE (ML) INTRAVENOUS EVERY 6 HOURS PRN
Status: DISCONTINUED | OUTPATIENT
Start: 2025-04-22 | End: 2025-04-23

## 2025-04-22 RX ORDER — ACETAMINOPHEN 500 MG
1000 TABLET ORAL
Status: DISCONTINUED | OUTPATIENT
Start: 2025-04-22 | End: 2025-04-22 | Stop reason: HOSPADM

## 2025-04-22 RX ORDER — CALCIUM CHLORIDE DIHYDRATE 100 MG/ML
INJECTION, SOLUTION INTRAVENOUS
Status: DISCONTINUED | OUTPATIENT
Start: 2025-04-22 | End: 2025-04-22

## 2025-04-22 RX ORDER — KETAMINE HCL IN 0.9 % NACL 50 MG/5 ML
SYRINGE (ML) INTRAVENOUS
Status: DISCONTINUED | OUTPATIENT
Start: 2025-04-22 | End: 2025-04-22

## 2025-04-22 RX ORDER — SODIUM CHLORIDE 0.9 % (FLUSH) 0.9 %
10 SYRINGE (ML) INJECTION
Status: DISCONTINUED | OUTPATIENT
Start: 2025-04-22 | End: 2025-04-25 | Stop reason: HOSPADM

## 2025-04-22 RX ORDER — DEXMEDETOMIDINE HYDROCHLORIDE 100 UG/ML
INJECTION, SOLUTION INTRAVENOUS
Status: DISCONTINUED | OUTPATIENT
Start: 2025-04-22 | End: 2025-04-22

## 2025-04-22 RX ORDER — LEVALBUTEROL INHALATION SOLUTION 0.63 MG/3ML
0.63 SOLUTION RESPIRATORY (INHALATION)
Status: DISCONTINUED | OUTPATIENT
Start: 2025-04-22 | End: 2025-04-23

## 2025-04-22 RX ORDER — HYDROMORPHONE HYDROCHLORIDE 2 MG/ML
0.5 INJECTION, SOLUTION INTRAMUSCULAR; INTRAVENOUS; SUBCUTANEOUS EVERY 4 HOURS PRN
Status: DISCONTINUED | OUTPATIENT
Start: 2025-04-22 | End: 2025-04-22

## 2025-04-22 RX ORDER — EPHEDRINE SULFATE 50 MG/ML
INJECTION, SOLUTION INTRAVENOUS
Status: DISCONTINUED | OUTPATIENT
Start: 2025-04-22 | End: 2025-04-22

## 2025-04-22 RX ORDER — METRONIDAZOLE 500 MG/100ML
500 INJECTION, SOLUTION INTRAVENOUS
Status: DISCONTINUED | OUTPATIENT
Start: 2025-04-22 | End: 2025-04-22 | Stop reason: HOSPADM

## 2025-04-22 RX ORDER — ACETAMINOPHEN 500 MG
1000 TABLET ORAL
Status: COMPLETED | OUTPATIENT
Start: 2025-04-22 | End: 2025-04-22

## 2025-04-22 RX ADMIN — CEFAZOLIN 2 G: 330 INJECTION, POWDER, FOR SOLUTION INTRAMUSCULAR; INTRAVENOUS at 10:04

## 2025-04-22 RX ADMIN — SODIUM CHLORIDE: 0.9 INJECTION, SOLUTION INTRAVENOUS at 10:04

## 2025-04-22 RX ADMIN — METHOCARBAMOL 750 MG: 100 INJECTION, SOLUTION INTRAMUSCULAR; INTRAVENOUS at 03:04

## 2025-04-22 RX ADMIN — FENTANYL CITRATE 100 MCG: 50 INJECTION INTRAMUSCULAR; INTRAVENOUS at 10:04

## 2025-04-22 RX ADMIN — Medication: at 08:04

## 2025-04-22 RX ADMIN — DEXAMETHASONE SODIUM PHOSPHATE 4 MG: 4 INJECTION INTRA-ARTICULAR; INTRALESIONAL; INTRAMUSCULAR; INTRAVENOUS; SOFT TISSUE at 11:04

## 2025-04-22 RX ADMIN — KETOROLAC TROMETHAMINE 30 MG: 15 INJECTION, SOLUTION INTRAMUSCULAR; INTRAVENOUS at 11:04

## 2025-04-22 RX ADMIN — Medication 10 MG: at 01:04

## 2025-04-22 RX ADMIN — MAGNESIUM SULFATE HEPTAHYDRATE 1 G: 500 INJECTION, SOLUTION INTRAMUSCULAR; INTRAVENOUS at 12:04

## 2025-04-22 RX ADMIN — MORPHINE SULFATE 15 MG: 15 TABLET, FILM COATED, EXTENDED RELEASE ORAL at 08:04

## 2025-04-22 RX ADMIN — FENTANYL CITRATE 25 MCG: 50 INJECTION INTRAMUSCULAR; INTRAVENOUS at 03:04

## 2025-04-22 RX ADMIN — HYDROMORPHONE HYDROCHLORIDE 0.2 MG: 1 INJECTION, SOLUTION INTRAMUSCULAR; INTRAVENOUS; SUBCUTANEOUS at 01:04

## 2025-04-22 RX ADMIN — ACETAMINOPHEN 1000 MG: 500 TABLET ORAL at 09:04

## 2025-04-22 RX ADMIN — ROCURONIUM BROMIDE 20 MG: 10 INJECTION, SOLUTION INTRAVENOUS at 01:04

## 2025-04-22 RX ADMIN — HYDROMORPHONE HYDROCHLORIDE 0.2 MG: 1 INJECTION, SOLUTION INTRAMUSCULAR; INTRAVENOUS; SUBCUTANEOUS at 03:04

## 2025-04-22 RX ADMIN — METHOCARBAMOL 750 MG: 100 INJECTION, SOLUTION INTRAMUSCULAR; INTRAVENOUS at 09:04

## 2025-04-22 RX ADMIN — LABETALOL HYDROCHLORIDE 10 MG: 5 INJECTION, SOLUTION INTRAVENOUS at 05:04

## 2025-04-22 RX ADMIN — GABAPENTIN 300 MG: 300 CAPSULE ORAL at 08:04

## 2025-04-22 RX ADMIN — Medication 200 MCG: at 11:04

## 2025-04-22 RX ADMIN — Medication 10 MG: at 02:04

## 2025-04-22 RX ADMIN — HYDROMORPHONE HYDROCHLORIDE 0.2 MG: 1 INJECTION, SOLUTION INTRAMUSCULAR; INTRAVENOUS; SUBCUTANEOUS at 02:04

## 2025-04-22 RX ADMIN — ROCURONIUM BROMIDE 30 MG: 10 INJECTION, SOLUTION INTRAVENOUS at 11:04

## 2025-04-22 RX ADMIN — MIDAZOLAM 2 MG: 1 INJECTION INTRAMUSCULAR; INTRAVENOUS at 10:04

## 2025-04-22 RX ADMIN — EPHEDRINE SULFATE 2.5 MG: 50 INJECTION INTRAVENOUS at 01:04

## 2025-04-22 RX ADMIN — DEXMEDETOMIDINE 4 MCG: 200 INJECTION, SOLUTION INTRAVENOUS at 02:04

## 2025-04-22 RX ADMIN — Medication: at 04:04

## 2025-04-22 RX ADMIN — PROPOFOL 200 MG: 10 INJECTION, EMULSION INTRAVENOUS at 10:04

## 2025-04-22 RX ADMIN — HYDROMORPHONE HYDROCHLORIDE 0.5 MG: 2 INJECTION INTRAMUSCULAR; INTRAVENOUS; SUBCUTANEOUS at 07:04

## 2025-04-22 RX ADMIN — GLYCOPYRROLATE 0.2 MG: 0.2 INJECTION, SOLUTION INTRAMUSCULAR; INTRAVENOUS at 12:04

## 2025-04-22 RX ADMIN — EPHEDRINE SULFATE 5 MG: 50 INJECTION INTRAVENOUS at 12:04

## 2025-04-22 RX ADMIN — LEVALBUTEROL HYDROCHLORIDE 0.63 MG: 0.63 SOLUTION RESPIRATORY (INHALATION) at 08:04

## 2025-04-22 RX ADMIN — Medication 25 MG: at 11:04

## 2025-04-22 RX ADMIN — HEPARIN SODIUM 5000 UNITS: 5000 INJECTION INTRAVENOUS; SUBCUTANEOUS at 09:04

## 2025-04-22 RX ADMIN — CALCIUM CHLORIDE 250 MG: 100 INJECTION, SOLUTION INTRAVENOUS at 01:04

## 2025-04-22 RX ADMIN — SODIUM CHLORIDE, SODIUM GLUCONATE, SODIUM ACETATE, POTASSIUM CHLORIDE, MAGNESIUM CHLORIDE, SODIUM PHOSPHATE, DIBASIC, AND POTASSIUM PHOSPHATE: .53; .5; .37; .037; .03; .012; .00082 INJECTION, SOLUTION INTRAVENOUS at 10:04

## 2025-04-22 RX ADMIN — MUPIROCIN: 20 OINTMENT TOPICAL at 08:04

## 2025-04-22 RX ADMIN — DEXMEDETOMIDINE 6 MCG: 200 INJECTION, SOLUTION INTRAVENOUS at 02:04

## 2025-04-22 RX ADMIN — LIDOCAINE HYDROCHLORIDE 100 MG: 20 INJECTION, SOLUTION EPIDURAL; INFILTRATION; INTRACAUDAL at 10:04

## 2025-04-22 RX ADMIN — SUGAMMADEX 200 MG: 100 INJECTION, SOLUTION INTRAVENOUS at 02:04

## 2025-04-22 RX ADMIN — HYDRALAZINE HYDROCHLORIDE 10 MG: 20 INJECTION, SOLUTION INTRAMUSCULAR; INTRAVENOUS at 03:04

## 2025-04-22 RX ADMIN — HALOPERIDOL LACTATE 0.5 MG: 5 INJECTION, SOLUTION INTRAMUSCULAR at 03:04

## 2025-04-22 RX ADMIN — ACETAMINOPHEN 1000 MG: 10 INJECTION, SOLUTION INTRAVENOUS at 07:04

## 2025-04-22 RX ADMIN — ROCURONIUM BROMIDE 70 MG: 10 INJECTION, SOLUTION INTRAVENOUS at 10:04

## 2025-04-22 RX ADMIN — ROCURONIUM BROMIDE 20 MG: 10 INJECTION, SOLUTION INTRAVENOUS at 11:04

## 2025-04-22 RX ADMIN — CELECOXIB 400 MG: 200 CAPSULE ORAL at 09:04

## 2025-04-22 NOTE — BRIEF OP NOTE
[facility] ([facility])  Brief Operative Note    SUMMARY     Surgery Date: 4/22/2025     Surgeons and Role:     * Matthew Butler MD - Primary     * Zulay Hunter MD - Resident - Chief    Assisting Surgeon: None    Pre-op Diagnosis:  Malignant neoplasm of pancreas, unspecified location of malignancy [C25.9]    Post-op Diagnosis:  Post-Op Diagnosis Codes:     * Malignant neoplasm of pancreas, unspecified location of malignancy [C25.9]    Procedure(s) (LRB):  XI ROBOTIC PANCREATECTOMY, DISTAL, robotic distal pancreatectomy,splenectomy (N/A)    Anesthesia: General    Implants:  * No implants in log *    Operative Findings: Robotic distal pancreatectomy, splenectomy    Estimated Blood Loss: minimal         Specimens:   Specimen (24h ago, onward)       Start     Ordered    04/22/25 1401  Specimen to Pathology  RELEASE UPON ORDERING        References:    Click here for ordering Quick Tip   Question:  Release to patient  Answer:  Immediate    04/22/25 1401                  ID Type Source Tests Collected by Time Destination   1 : 1- gastric node- permanent Tissue Stomach SPECIMEN TO PATHOLOGY Matthew Butler MD 4/22/2025 1355    2 : 2- distal pancreas and spleen- permanent Tissue Pancreas SPECIMEN TO PATHOLOGY Matthew Butler MD 4/22/2025 1400        [id number]

## 2025-04-22 NOTE — PROGRESS NOTES
Pt reports having Medtr[employer] pacemaker implanted, June from pacemaker clinic notified at 0797. No further instructions at this time.

## 2025-04-22 NOTE — ANESTHESIA PREPROCEDURE EVALUATION
[facility]  Anesthesia Pre-Operative Evaluation        Patient Name: Fred Benjamin  YOB: 1955  MRN: [id number]    SUBJECTIVE:     Pre-operative Evaluation for Procedure(s) (LRB):  XI ROBOTIC PANCREATECTOMY, DISTAL, robotic distal pancreatectomy,splenectomy (N/A)  XI ROBOTIC SPLENECTOMY, robotic distal pancreatectomy, splenectomy (N/A)     04/21/2025    Fred Benjamin is a 70 y.o. male with a PMHx significant for HTN, CHF, A-fib, GERD, BHUMIKA, anemia, and pancreatic tail adenocarcinoma .     He now presents for the above procedure(s) with Surgical Oncology - Dr. Butler.    Previous Airway: Date/Time: 9/20/2024 2:31 PM     Performed by: Carrie Muniz CRNA  Authorized by: Germán Khanna MD    Intubation:     Induction:  Intravenous    Intubated:  Postinduction    Mask Ventilation:  Easy mask    Attempts:  1    Attempted By:  CRNA    Method of Intubation:  Video laryngoscopy    Blade:  Ring 4    Laryngeal View Grade: Grade I - full view of cords      Difficult Airway Encountered?: No      Complications:  None    Airway Device:  Oral endotracheal tube    Airway Device Size:  7.5    Style/Cuff Inflation:  Cuffed    Secured at:  The lips    Placement Verified By:  Capnometry    Complicating Factors:  None    Findings Post-Intubation:  BS equal bilateral and atraumatic/condition of teeth unchanged    Problem List[1]    Review of patient's allergies indicates:   Allergen Reactions    Cleocin [clindamycin hcl] Shortness Of Breath     Constipation    Amiodarone     Amiodarone analogues      Affected thyroid    Dabigatran etexilate     Rythmol [propafenone]      Weight loss/ nausea       Current Outpatient Medications   Medication Instructions    amLODIPine (NORVASC) 10 mg, Nightly    ammonium lactate 12 % Crea 1 Application, Topical (Top), See admin instructions    ELIQUIS 5 mg, 2 times daily    morphine (MS CONTIN) 60 mg, 3 times daily    naphazoline-pheniramine  0.025-0.3% (NAPHCON-A) 0.025-0.3 % ophthalmic solution 1-2 drops, Daily PRN    oxyCODONE (ROXICODONE) 30 mg, 4 times daily PRN    sotaloL (BETAPACE) 160 mg, Oral, Daily       Past Surgical History:   Procedure Laterality Date    ABCESS DRAINAGE  06/01/2018    I&D with irrigation abcess left lower back    ABLATION      ATRIAL ABLATION SURGERY      x 2    BLADDER TUMOR EXCISION  03/14/2017     cysto/Cancer    CARDIAC PACEMAKER PLACEMENT      CARDIOVERSION      carpel tunnel Bilateral     CERVICAL FUSION      x3    CHOLECYSTECTOMY      COLONOSCOPY      ENDOSCOPIC ULTRASOUND OF UPPER GASTROINTESTINAL TRACT Left 09/13/2024    Procedure: ULTRASOUND, UPPER GI TRACT, ENDOSCOPIC;  Surgeon: Gerald Ochoa MD;  Location: [facility] ENDO;  Service: Endoscopy;  Laterality: Left;    ERCP N/A 09/20/2024    Procedure: ERCP (ENDOSCOPIC RETROGRADE CHOLANGIOPANCREATOGRAPHY);  Surgeon: Mariano Ferreira III, MD;  Location: [facility] ENDO;  Service: Endoscopy;  Laterality: N/A;    ESOPHAGUS SURGERY      ting and then reversal    FOOT FRACTURE SURGERY      FRACTURE SURGERY      INCISION AND DRAINAGE OF ABSCESS Left 06/01/2018    Procedure: INCISION AND DRAINAGE, ABSCESS - left, lower back;  Surgeon: Irving Edouard MD;  Location: [facility] OR;  Service: Orthopedics;  Laterality: Left;    INSERTION OF TUNNELED CENTRAL VENOUS CATHETER (CVC) WITH SUBCUTANEOUS PORT N/A 10/18/2024    Procedure: [id number]-CATH;  Surgeon: Irving Alicia MD;  Location: [facility] OR;  Service: General;  Laterality: N/A;    KNEE ARTHROSCOPY W/ MENISCAL REPAIR Left     ROTATOR CUFF REPAIR Left        Social History     Substance and Sexual Activity   Drug Use No     Alcohol Use: Not At Risk (4/3/2025)    AUDIT-C     Frequency of Alcohol Consumption: Monthly or less     Average Number of Drinks: Patient does not drink     Frequency of Binge Drinking: Never     Tobacco Use: Medium Risk (4/2/2025)    Patient History     Smoking Tobacco Use: Former     Smokeless Tobacco  Use: Never     Passive Exposure: Past       OBJECTIVE:     Vital Signs Range (Last 24H):         Significant Labs    Heme Profile  Lab Results   Component Value Date    WBC 6.83 03/21/2025    HGB 10.5 (L) 03/21/2025    HCT 34.0 (L) 03/21/2025     03/21/2025       Coagulation Studies  Lab Results   Component Value Date    INR 1.2 11/26/2024    APTT 28.0 06/08/2016       BMP  Lab Results   Component Value Date     03/21/2025    K 4.7 03/21/2025     03/21/2025    CO2 22 (L) 03/21/2025    BUN 13 03/21/2025    CREATININE 1.0 03/21/2025    MG 1.2 (L) 02/20/2025    PHOS 2.0 (L) 01/20/2025       Liver Function Tests  Lab Results   Component Value Date    AST 16 03/21/2025    ALT 10 03/21/2025    ALKPHOS 102 03/21/2025    BILITOT 0.3 03/21/2025    PROT 6.5 03/21/2025    ALBUMIN 3.0 (L) 03/21/2025       Lipid Profile  Lab Results   Component Value Date    CHOL 137 10/03/2024    HDL 23 (L) 10/03/2024    TRIG 223 (H) 10/03/2024       Endocrine Profile  Lab Results   Component Value Date    HGBA1C 6.0 (H) 12/17/2024       Diagnostic Studies    Cardiac Studies    EKG:   Results for orders placed or performed during the hospital encounter of 02/14/25   EKG 12-lead    Collection Time: 02/14/25  2:44 PM   Result Value Ref Range    QRS Duration 88 ms    [facility] QTC Calculation 520 ms    Narrative    Test Reason : R07.9,    Vent. Rate : 105 BPM     Atrial Rate : 105 BPM     P-R Int : 150 ms          QRS Dur :  88 ms      QT Int : 394 ms       P-R-T Axes :  73 -47  85 degrees    QTcB Int : 520 ms    Sinus tachycardia  Left axis deviation  Abnormal ECG  When compared with ECG of 14-Feb-2025 14:16,  Non-specific change in ST segment in Inferior leads  T wave inversion no longer evident in Inferior leads  Nonspecific T wave abnormality now evident in Lateral leads  Confirmed by Chele Verma ([id number]) on 2/20/2025 4:01:54 PM    Referred By: AAAREFERRAL SELF           Confirmed By: Chele Verma       Transthoracic  Echo:  Results for orders placed during the hospital encounter of 11/26/24    Echo    Interpretation Summary    Left Ventricle: The left ventricle is normal in size. Normal wall thickness. There is low normal systolic function with a visually estimated ejection fraction of 50 - 55%. Grade I diastolic dysfunction.    Right Ventricle: Normal right ventricular cavity size. Wall thickness is normal. Systolic function is normal.    Aortic Valve: There is mild aortic valve sclerosis.    Mitral Valve: There is mild regurgitation.    IVC/SVC: Normal venous pressure at 3 mmHg.    ASSESSMENT/PLAN:       Pre-op Assessment    I have reviewed the Patient Summary Reports.     I have reviewed the Nursing Notes. I have reviewed the NPO Status.   I have reviewed the Medications.     Review of Systems  Social:  Former Smoker       Hematology/Oncology:    Oncology Normal    -- Anemia:                                  EENT/Dental:  EENT/Dental Normal           Cardiovascular:     Hypertension       CHF                                   Pulmonary:        Sleep Apnea                Renal/:  Renal/ Normal                 Hepatic/GI:     GERD                Musculoskeletal:  Musculoskeletal Normal                Neurological:  Neurology Normal                                      Endocrine:  Endocrine Normal            Psych:  Psychiatric Normal                     Anesthesia Plan  Type of Anesthesia, risks & benefits discussed:    Anesthesia Type: Gen ETT  Intra-op Monitoring Plan: Art Line and Standard ASA Monitors  Post Op Pain Control Plan: multimodal analgesia and IV/PO Opioids PRN  Induction:  IV  Airway Plan: Direct, Post-Induction  Informed Consent: Informed consent signed with the Patient and all parties understand the risks and agree with anesthesia plan.  All questions answered.   ASA Score: 3  Day of Surgery Review of History & Physical: H&P Update referred to the surgeon/provider.  Anesthesia Plan Notes: Chart reviewed.  Patient seen and examined. Anesthesia plan discussed and questions answered. E-consent signed. Hilario Guardado MD    Ready For Surgery From Anesthesia Perspective.     .           [1]   Patient Active Problem List  Diagnosis    Paroxysmal atrial fibrillation    Obesity, Class I, BMI 30-34.9    Xerostomia    Complex sleep apnea syndrome    ACP (advance care planning)    Pancreatic cancer    Chemotherapy induced diarrhea    Dehydration    Hypokalemia    FLOR[place] (acute kidney injury)    Hx of Clostridium difficile infection    Nausea & vomiting    Hypotension    Hypoalbuminemia    Immunosuppression due to drug therapy    Fever    Diarrhea    Acute hypoxic respiratory failure    Neck stiffness    Hyperkalemia    Opioid dependence    AMS (altered mental status)    Chronic diastolic CHF (congestive heart failure)    Abdominal pain, vomiting, and diarrhea    Metabolic acidosis, increased anion gap    Hyponatremia    SIRS (systemic inflammatory response syndrome)    Essential hypertension    Cardiac pacemaker in situ    Prediabetes    History of bladder cancer    Coronavirus infection    Debility    Aspiration pneumonia of left lower lobe    Pulmonary nodules    Chemotherapy adverse reaction    Mild malnutrition    Acute renal failure    Hypomagnesemia

## 2025-04-22 NOTE — ANESTHESIA POSTPROCEDURE EVALUATION
Anesthesia Post Evaluation    Patient: Fred Benjamin    Procedure(s) Performed: Procedure(s) (LRB):  XI ROBOTIC PANCREATECTOMY, DISTAL, robotic distal pancreatectomy,splenectomy (N/A)    Final Anesthesia Type: general      Patient participation: Yes- Able to Participate  Level of consciousness: awake and alert  Post-procedure vital signs: reviewed and stable  Pain control: Pain has been treated.  Airway patency: patent    PONV status: Absent or treated.  Anesthetic complications: no      Cardiovascular status: hemodynamically stable  Respiratory status: unassisted  Hydration status: euvolemic  Follow-up not needed.              Vitals Value Taken Time   /89 04/22/25 16:47   Temp 36.6 °C (97.9 °F) 04/22/25 14:50   Pulse 62 04/22/25 16:51   Resp 15 04/22/25 16:51   SpO2 95 % 04/22/25 16:51   Vitals shown include unfiled device data.      Event Time   Out of Recovery 15:15:00         Pain/Naomi Score: Pain Rating Prior to Med Admin: 8 (4/22/2025  4:22 PM)  Naomi Score: 9 (4/22/2025  3:15 PM)

## 2025-04-22 NOTE — ANESTHESIA PROCEDURE NOTES
Intubation    Date/Time: 4/22/2025 10:35 AM    Performed by: Roberto Mancera MD  Authorized by: Hilario Guardado MD    Intubation:     Induction:  Intravenous    Intubated:  Postinduction    Mask Ventilation:  Easy with oral airway    Attempts:  1    Attempted By:  Resident anesthesiologist    Method of Intubation:  Video laryngoscopy    Blade:  Ring 3    Laryngeal View Grade: Grade I - full view of cords      Difficult Airway Encountered?: No      Complications:  None    Airway Device:  Oral endotracheal tube    Airway Device Size:  7.5    Style/Cuff Inflation:  Cuffed (inflated to minimal occlusive pressure)    Tube secured:  23    Secured at:  The lips    Placement Verified By:  Capnometry    Complicating Factors:  None    Findings Post-Intubation:  BS equal bilateral and atraumatic/condition of teeth unchanged

## 2025-04-22 NOTE — PROGRESS NOTES
Changes noted to EKG with inverted T waves. Anesthesia notified. EKG in progress at bedside per orders.

## 2025-04-22 NOTE — ANESTHESIA PROCEDURE NOTES
Left Radial Arterial Line    Diagnosis: Malignant neoplasm of pancreas, unspecified location of malignancy    Patient location during procedure: done in OR    Staffing  Authorizing Provider: Hilario Guardado MD  Performing Provider: Roberto Mancera MD    Staffing  Performed by: Roberto Mancera MD  Authorized by: Hilario Guardado MD    Anesthesiologist was present at the time of the procedure.    Preanesthetic Checklist  Completed: patient identified, IV checked, site marked, risks and benefits discussed, surgical consent, monitors and equipment checked, pre-op evaluation, timeout performed and anesthesia consent givenLeft Radial Arterial Line  Skin Prep: chlorhexidine gluconate and isopropyl alcohol  Local Infiltration: none  Orientation: left  Location: radial    Catheter Size: 20 G  Catheter placement by Ultrasound guidance. Heme positive aspiration all ports.   Vessel Caliber: small, patent, compressibility normal  Vascular Doppler:  not done  Needle advanced into vessel with real time Ultrasound guidance.Insertion Attempts: 1  Assessment  Dressing: secured with tape and tegaderm  Patient: Tolerated well

## 2025-04-22 NOTE — PROGRESS NOTES
Per June, pt is requiring cardiac device interrogation before going to surgery. Order placed and Dr. Butler's resident notified.

## 2025-04-22 NOTE — OP NOTE
Cullen Rodriguez - Surgery ([facility])  Surgery Department  Operative Note       Date of Procedure: 4/22/2025     Procedure: Procedure(s) (LRB):  XI ROBOTIC PANCREATECTOMY, DISTAL, robotic distal pancreatectomy,splenectomy (N/A)     Surgeons and Role:     * Matthew Butler MD - Primary     * Zulay Hunter MD - Resident - Chief    Assisting Surgeon: None    Pre-Operative Diagnosis: Malignant neoplasm of pancreas, unspecified location of malignancy [C25.9]    Pre-Operative Variables:  Preoperative diagnosis: pancreatic adenocarcinoma  Going into surgery, the lesion was believed to be resectable  Adjacent organ involvement: N  Chemotherapy within 90 Days: Y  Radiation Therapy within 90 Days: N    Post-Operative Diagnosis:   Same    Anesthesia: General    Operative Findings:   No evidence of distant intraperitoneal metastases   Resection status:  R0 pending final pathology.  No gross disease remaining post dissection.    Procedures:  Robotic assisted distal pancreatectomy and splenectomy    Estimated Blood Loss (EBL):  50 mL           Indications:  Fred Benjamin presents for the above procedures.  Risks and benefits were reviewed including bleeding, infection, damage to local structures, cardiovascular and pulmonary complications, need for additional procedures, death, and imponderables.  He understands and gave informed consent to proceed.    Details:  The patient was transported to the operating room and satisfactory anesthesia established.  Preoperative antibiotics were administered.  The patient was placed in the supine position and extremities were padded and protected throughout. A foot board was placed to facilitate positioning and an orogastric tube to decompress the stomach. A vieira catheter was placed. An appropriate timeout was performed.    Peritoneal access was obtained with the Verees in Chen's point and CO2 insufflated. The abdomen is entered off midline with an Optiview trocar and the abdomen  was inspected for injury and carcinomatosis and none was found. Remaining robotic trocars were placed supraumbilical, left mid-axillary, and right mid-clavicular line. A small pfannenstiel is made with a gelpoint for assistant trocar and specimen extraction. The patient is positioned, the robot was docked and instruments were inserted under direct vision. Adhesions from previous umbilical hernia repair are noted and divided for exposure.     The lesser sac was entered through the gastrocolic ligament and the greater curve of the stomach was mobilized superiorly to the level of the left gilson. He has had previous fundoplication surgery and his short gastrics are already divided. The splenocolic ligament was divided to drop the splenic flexure of the colon and expose the inferior border of the spleen.    The superior and inferior borders of the pancreas were defined with cautery, creating an retropancreatic window and careful to stay supero-anterior to the transverse mesocolon and duodenum.    The splenic artery was identified, dissected circumferentially along its length and divided clips and the robotic 30mm vascular stapler. The splenic vein posteriorly was similarly dissected free and divided. The retropancreatic window was completed and the pancreas was divided with a black load reinforced perla stapler. This looks good but there is some pulsatile bleeding at the superior border of the pancreas that I control with a figure of 12 suture to good effect.    The cut edge of the pancreatic specimen was grasped and elevated and cautery/vessel sealing device was used to complete the posterior dissection and divide the diaphragmatic attachments to the spleen. As I get closer to this distal tumor we enter through the pre-renal fascia and incorporate gerota's fascia into our dissection for adequate posterior margin.    The specimen was placed in a bag. The dissection bed is coated in hemoblast powder.    The robot is undocked  and the specimen extracted through the access incision.     The fascia was closed using 0 non-looped PDS in deep bite, shallow advance fashion.  The wounds are irrigated and skin closed with Monocryl and dermabond.      All needle, lap, and sponge counts were reported as correct. I communicated the intraoperative findings with the family following the procedure.     Implants: * No implants in log *    Specimens:   Specimen (24h ago, onward)       Start     Ordered    04/22/25 1401  Specimen to Pathology  RELEASE UPON ORDERING        References:    Click here for ordering Quick Tip   Question:  Release to patient  Answer:  Immediate    04/22/25 1401                            Condition: Good    Disposition: PACU - hemodynamically stable.    Attestation: I was present and scrubbed for the entire procedure.

## 2025-04-22 NOTE — PROGRESS NOTES
Patient has been identified as having an implanted cardiac rhythm device (CRD); the implanted device is a [employer]tronic pacemaker/      The planned surgical procedure is a pancreatectomy/splenectomy.    Pacer dependency has been noted.   Underlying rhythm is SB in 40s.      PACEMAKERS/DEPENDENT ABOVE WAIST     The reported surgical procedure is above the umbilicus.  Per protocol, apply a magnet directly over the implanted device during entire surgical procedure if electrocautery will be used.      For additional questions, please contact the Arrhythmia Department at Ext [phone].

## 2025-04-22 NOTE — TRANSFER OF CARE
Anesthesia Transfer of Care Note    Patient: Fred Benjamin    Procedure(s) Performed: Procedure(s) (LRB):  XI ROBOTIC PANCREATECTOMY, DISTAL, robotic distal pancreatectomy,splenectomy (N/A)    Patient location: PACU    Anesthesia Type: general    Transport from OR: Transported from OR on 6-10 L/min O2 by face mask with adequate spontaneous ventilation    Post pain: adequate analgesia    Post assessment: no apparent anesthetic complications    Post vital signs: stable    Level of consciousness: awake and alert    Nausea/Vomiting: no nausea/vomiting    Complications: none    Transfer of care protocol was followed    Last vitals: Visit Vitals  BP (!) 172/97 (BP Location: Left arm, Patient Position: Lying)   Pulse 84   Temp 36.6 °C (97.9 °F) (Temporal)   Resp 18   Ht 6' (1.829 m)   Wt 81.6 kg (180 lb)   SpO2 100%   BMI 24.41 kg/m²

## 2025-04-23 LAB
ABSOLUTE EOSINOPHIL (OHS): 0 K/UL
ABSOLUTE MONOCYTE (OHS): 1 K/UL (ref 0.3–1)
ABSOLUTE NEUTROPHIL COUNT (OHS): 13.55 K/UL (ref 1.8–7.7)
ALBUMIN SERPL BCP-MCNC: 3.2 G/DL (ref 3.5–5.2)
ALP SERPL-CCNC: 100 UNIT/L (ref 40–150)
ALT SERPL W/O P-5'-P-CCNC: 9 UNIT/L (ref 10–44)
ANION GAP (OHS): 9 MMOL/L (ref 8–16)
AST SERPL-CCNC: 18 UNIT/L (ref 11–45)
BASOPHILS # BLD AUTO: 0.04 K/UL
BASOPHILS NFR BLD AUTO: 0.2 %
BILIRUB SERPL-MCNC: 0.4 MG/DL (ref 0.1–1)
BUN SERPL-MCNC: 15 MG/DL (ref 8–23)
CALCIUM SERPL-MCNC: 8.7 MG/DL (ref 8.7–10.5)
CHLORIDE SERPL-SCNC: 104 MMOL/L (ref 95–110)
CO2 SERPL-SCNC: 24 MMOL/L (ref 23–29)
CREAT SERPL-MCNC: 0.8 MG/DL (ref 0.5–1.4)
ERYTHROCYTE [DISTWIDTH] IN BLOOD BY AUTOMATED COUNT: 13.9 % (ref 11.5–14.5)
GFR SERPLBLD CREATININE-BSD FMLA CKD-EPI: >60 ML/MIN/1.73/M2
GLUCOSE SERPL-MCNC: 119 MG/DL (ref 70–110)
GLUCOSE SERPL-MCNC: 146 MG/DL (ref 70–110)
HCO3 UR-SCNC: 23.1 MMOL/L (ref 24–28)
HCT VFR BLD AUTO: 38.8 % (ref 40–54)
HCT VFR BLD CALC: 36 %PCV (ref 36–54)
HGB BLD-MCNC: 12.2 GM/DL (ref 14–18)
IMM GRANULOCYTES # BLD AUTO: 0.07 K/UL (ref 0–0.04)
IMM GRANULOCYTES NFR BLD AUTO: 0.4 % (ref 0–0.5)
LYMPHOCYTES # BLD AUTO: 1.41 K/UL (ref 1–4.8)
MAGNESIUM SERPL-MCNC: 1.9 MG/DL (ref 1.6–2.6)
MCH RBC QN AUTO: 27.4 PG (ref 27–31)
MCHC RBC AUTO-ENTMCNC: 31.4 G/DL (ref 32–36)
MCV RBC AUTO: 87 FL (ref 82–98)
NUCLEATED RBC (/100WBC) (OHS): 0 /100 WBC
OHS QRS DURATION: 132 MS
OHS QTC CALCULATION: 533 MS
PCO2 BLDA: 49.6 MMHG (ref 35–45)
PH SMN: 7.28 [PH] (ref 7.35–7.45)
PHOSPHATE SERPL-MCNC: 4.7 MG/DL (ref 2.7–4.5)
PLATELET # BLD AUTO: 266 K/UL (ref 150–450)
PMV BLD AUTO: 10.2 FL (ref 9.2–12.9)
PO2 BLDA: 91 MMHG (ref 80–100)
POC BE: -4 MMOL/L (ref -2–2)
POC IONIZED CALCIUM: 1.1 MMOL/L (ref 1.06–1.42)
POC SATURATED O2: 96 % (ref 95–100)
POC TCO2: 25 MMOL/L (ref 23–27)
POTASSIUM BLD-SCNC: 4.2 MMOL/L (ref 3.5–5.1)
POTASSIUM SERPL-SCNC: 4.3 MMOL/L (ref 3.5–5.1)
PROT SERPL-MCNC: 6.9 GM/DL (ref 6–8.4)
RBC # BLD AUTO: 4.46 M/UL (ref 4.6–6.2)
RELATIVE EOSINOPHIL (OHS): 0 %
RELATIVE LYMPHOCYTE (OHS): 8.8 % (ref 18–48)
RELATIVE MONOCYTE (OHS): 6.2 % (ref 4–15)
RELATIVE NEUTROPHIL (OHS): 84.4 % (ref 38–73)
SAMPLE: ABNORMAL
SODIUM BLD-SCNC: 139 MMOL/L (ref 136–145)
SODIUM SERPL-SCNC: 137 MMOL/L (ref 136–145)
WBC # BLD AUTO: 16.07 K/UL (ref 3.9–12.7)

## 2025-04-23 PROCEDURE — 84100 ASSAY OF PHOSPHORUS: CPT | Performed by: STUDENT IN AN ORGANIZED HEALTH CARE EDUCATION/TRAINING PROGRAM

## 2025-04-23 PROCEDURE — 97162 PT EVAL MOD COMPLEX 30 MIN: CPT

## 2025-04-23 PROCEDURE — 83735 ASSAY OF MAGNESIUM: CPT | Performed by: STUDENT IN AN ORGANIZED HEALTH CARE EDUCATION/TRAINING PROGRAM

## 2025-04-23 PROCEDURE — 63600175 PHARM REV CODE 636 W HCPCS: Performed by: STUDENT IN AN ORGANIZED HEALTH CARE EDUCATION/TRAINING PROGRAM

## 2025-04-23 PROCEDURE — 63600175 PHARM REV CODE 636 W HCPCS

## 2025-04-23 PROCEDURE — 11000001 HC ACUTE MED/SURG PRIVATE ROOM

## 2025-04-23 PROCEDURE — 25000003 PHARM REV CODE 250

## 2025-04-23 PROCEDURE — 85025 COMPLETE CBC W/AUTO DIFF WBC: CPT | Performed by: STUDENT IN AN ORGANIZED HEALTH CARE EDUCATION/TRAINING PROGRAM

## 2025-04-23 PROCEDURE — 25000003 PHARM REV CODE 250: Performed by: STUDENT IN AN ORGANIZED HEALTH CARE EDUCATION/TRAINING PROGRAM

## 2025-04-23 PROCEDURE — 99900035 HC TECH TIME PER 15 MIN (STAT)

## 2025-04-23 PROCEDURE — 63600175 PHARM REV CODE 636 W HCPCS: Performed by: SURGERY

## 2025-04-23 PROCEDURE — 97116 GAIT TRAINING THERAPY: CPT

## 2025-04-23 PROCEDURE — 97165 OT EVAL LOW COMPLEX 30 MIN: CPT

## 2025-04-23 PROCEDURE — 97535 SELF CARE MNGMENT TRAINING: CPT

## 2025-04-23 PROCEDURE — 80053 COMPREHEN METABOLIC PANEL: CPT | Performed by: STUDENT IN AN ORGANIZED HEALTH CARE EDUCATION/TRAINING PROGRAM

## 2025-04-23 PROCEDURE — 94664 DEMO&/EVAL PT USE INHALER: CPT

## 2025-04-23 RX ORDER — OXYCODONE HYDROCHLORIDE 10 MG/1
30 TABLET ORAL 4 TIMES DAILY PRN
Refills: 0 | Status: DISCONTINUED | OUTPATIENT
Start: 2025-04-23 | End: 2025-04-23

## 2025-04-23 RX ORDER — OXYCODONE HYDROCHLORIDE 10 MG/1
30 TABLET ORAL EVERY 6 HOURS PRN
Refills: 0 | Status: DISCONTINUED | OUTPATIENT
Start: 2025-04-23 | End: 2025-04-25 | Stop reason: HOSPADM

## 2025-04-23 RX ORDER — HYDRALAZINE HYDROCHLORIDE 20 MG/ML
20 INJECTION INTRAMUSCULAR; INTRAVENOUS EVERY 6 HOURS PRN
Status: DISCONTINUED | OUTPATIENT
Start: 2025-04-23 | End: 2025-04-25 | Stop reason: HOSPADM

## 2025-04-23 RX ORDER — SOTALOL HYDROCHLORIDE 80 MG/1
80 TABLET ORAL DAILY
Status: DISCONTINUED | OUTPATIENT
Start: 2025-04-24 | End: 2025-04-25 | Stop reason: HOSPADM

## 2025-04-23 RX ORDER — AMLODIPINE BESYLATE 10 MG/1
10 TABLET ORAL NIGHTLY
Status: DISCONTINUED | OUTPATIENT
Start: 2025-04-23 | End: 2025-04-25 | Stop reason: HOSPADM

## 2025-04-23 RX ORDER — LEVALBUTEROL INHALATION SOLUTION 0.63 MG/3ML
0.63 SOLUTION RESPIRATORY (INHALATION) EVERY 6 HOURS PRN
Status: DISCONTINUED | OUTPATIENT
Start: 2025-04-23 | End: 2025-04-25 | Stop reason: HOSPADM

## 2025-04-23 RX ORDER — MORPHINE SULFATE 30 MG/1
60 TABLET, FILM COATED, EXTENDED RELEASE ORAL 3 TIMES DAILY
Refills: 0 | Status: DISCONTINUED | OUTPATIENT
Start: 2025-04-23 | End: 2025-04-25 | Stop reason: HOSPADM

## 2025-04-23 RX ORDER — HYDROMORPHONE HYDROCHLORIDE 2 MG/ML
2 INJECTION, SOLUTION INTRAMUSCULAR; INTRAVENOUS; SUBCUTANEOUS EVERY 4 HOURS PRN
Status: DISCONTINUED | OUTPATIENT
Start: 2025-04-23 | End: 2025-04-25 | Stop reason: HOSPADM

## 2025-04-23 RX ORDER — LABETALOL HCL 20 MG/4 ML
10 SYRINGE (ML) INTRAVENOUS EVERY 6 HOURS PRN
Status: DISCONTINUED | OUTPATIENT
Start: 2025-04-23 | End: 2025-04-25 | Stop reason: HOSPADM

## 2025-04-23 RX ADMIN — MORPHINE SULFATE 60 MG: 30 TABLET, FILM COATED, EXTENDED RELEASE ORAL at 08:04

## 2025-04-23 RX ADMIN — HYDROMORPHONE HYDROCHLORIDE 0.5 MG: 2 INJECTION INTRAMUSCULAR; INTRAVENOUS; SUBCUTANEOUS at 02:04

## 2025-04-23 RX ADMIN — METHOCARBAMOL 750 MG: 100 INJECTION, SOLUTION INTRAMUSCULAR; INTRAVENOUS at 02:04

## 2025-04-23 RX ADMIN — LABETALOL HYDROCHLORIDE 10 MG: 5 INJECTION, SOLUTION INTRAVENOUS at 12:04

## 2025-04-23 RX ADMIN — KETOROLAC TROMETHAMINE 30 MG: 15 INJECTION, SOLUTION INTRAMUSCULAR; INTRAVENOUS at 12:04

## 2025-04-23 RX ADMIN — HYDRALAZINE HYDROCHLORIDE 20 MG: 20 INJECTION INTRAMUSCULAR; INTRAVENOUS at 09:04

## 2025-04-23 RX ADMIN — MUPIROCIN: 20 OINTMENT TOPICAL at 08:04

## 2025-04-23 RX ADMIN — KETOROLAC TROMETHAMINE 30 MG: 15 INJECTION, SOLUTION INTRAMUSCULAR; INTRAVENOUS at 11:04

## 2025-04-23 RX ADMIN — KETOROLAC TROMETHAMINE 30 MG: 15 INJECTION, SOLUTION INTRAMUSCULAR; INTRAVENOUS at 05:04

## 2025-04-23 RX ADMIN — AMLODIPINE BESYLATE 10 MG: 10 TABLET ORAL at 08:04

## 2025-04-23 RX ADMIN — GABAPENTIN 300 MG: 300 CAPSULE ORAL at 08:04

## 2025-04-23 RX ADMIN — Medication: at 07:04

## 2025-04-23 RX ADMIN — MORPHINE SULFATE 60 MG: 30 TABLET, FILM COATED, EXTENDED RELEASE ORAL at 02:04

## 2025-04-23 RX ADMIN — ACETAMINOPHEN 1000 MG: 10 INJECTION, SOLUTION INTRAVENOUS at 12:04

## 2025-04-23 RX ADMIN — METHOCARBAMOL 750 MG: 100 INJECTION, SOLUTION INTRAMUSCULAR; INTRAVENOUS at 09:04

## 2025-04-23 RX ADMIN — ACETAMINOPHEN 1000 MG: 500 TABLET ORAL at 08:04

## 2025-04-23 RX ADMIN — ENOXAPARIN SODIUM 40 MG: 40 INJECTION SUBCUTANEOUS at 05:04

## 2025-04-23 RX ADMIN — ACETAMINOPHEN 1000 MG: 10 INJECTION, SOLUTION INTRAVENOUS at 03:04

## 2025-04-23 RX ADMIN — METHOCARBAMOL 750 MG: 100 INJECTION, SOLUTION INTRAMUSCULAR; INTRAVENOUS at 05:04

## 2025-04-23 RX ADMIN — Medication: at 01:04

## 2025-04-23 RX ADMIN — GABAPENTIN 300 MG: 300 CAPSULE ORAL at 02:04

## 2025-04-23 RX ADMIN — SOTALOL HYDROCHLORIDE 160 MG: 80 TABLET ORAL at 09:04

## 2025-04-23 RX ADMIN — MORPHINE SULFATE 15 MG: 15 TABLET, FILM COATED, EXTENDED RELEASE ORAL at 08:04

## 2025-04-23 NOTE — PLAN OF CARE
Problem: Occupational Therapy  Goal: Occupational Therapy Goal  Description: Goals to be met by: 4/30/25    Patient will increase functional independence with ADLs by performing:    LE Dressing with Set-up Assistance.  Grooming while standing at sink with [place].  Toileting from toilet with [place] for hygiene and clothing management.   Supine to sit with [place].  Step transfer with [place]  Toilet transfer to toilet with [place].    Outcome: Progressing

## 2025-04-23 NOTE — ASSESSMENT & PLAN NOTE
70 yoM with pancreatic adenocarcinoma of the tail of the pancreas s/p robotic distal pancreatectomy and splenectomy on 4/22. On pathway.    - Ok for stepdown  - Discontinue art line and vieira  - Continue clear liquid diet  - Discontinue PCA  - Restart home MS contin and prn IR oxy doses with breakthrough dilaudid  - Continue MMPC  - Ambulate and up to the chair 3x per day  - PT/OT  - Morning labs

## 2025-04-23 NOTE — NURSING TRANSFER
Nursing Transfer Note      4/22/2025   7:01 PM    Nurse giving handoff:ANALY Patterson PACU  Nurse receiving handoff:ANALY Kaplan PCU    Reason patient is being transferred: post anesthesia    Transfer To: [facility]    Transfer via bed      Transported by PCT, RN    Transfer Vital Signs:  Blood Pressure:170/98  Heart Rate:60  O2:94%-- RA  Temperature:  Respirations:19    Additional Lines: Morgan Catheter    Medicines sent: Dilaudid PCA    Any special needs or follow-up needed: n/a    Patient belongings transferred with patient: No    Chart send with patient: Yes    Notified: spouse    Patient reassessed at: 4/22/25@ 1800     Upon arrival to floor: cardiac monitor applied, patient oriented to room, call bell in reach, and bed in lowest position

## 2025-04-23 NOTE — PT/OT/SLP EVAL
Physical Therapy Co-Evaluation    Patient Name:  Fred Benjamin   MRN:  [id number]    Co-evaluation and co-treatment performed for this visit due to suspected patient need for two skilled therapists to ensure patient and staff safety and to accommodate for patient activity tolerance/pain management    Recommendations:     Discharge Recommendations: No Therapy Indicated   Discharge Equipment Recommendations: none   Barriers to discharge: None    Assessment:     Fred Benjamin is a 70 y.o. male admitted with a medical diagnosis of <principal problem not specified>.  He presents with the following impairments/functional limitations: weakness, impaired endurance, impaired self care skills, impaired functional mobility, gait instability, impaired balance, decreased lower extremity function, pain. Mr. Benjamin was received up in chair and was agreeable to therapy evaluation this date. Pt endorsing a 5/10 headache which he believes is from the bed. Reports he is feeling fatigued as he did not sleep very well last night. Pt was SBA for sit to stand transfer with no AD. He ambulated ~8' to the sink where he maintained standing with SBA while he completed ADLs with OT. Following this, pt continued to ambulate ~80' in hallway with SBA and no AD. Pt demonstrated slightly slowed gait speed with decreased step length bilaterally. Portable monitor was utilized throughout to monitor vitals with no concerns noted during session. Pt was returned to chair with all needs met. At this time, Mr. Benjamin is functioning below his baseline and would benefit from skilled intervention to address deficits listed as well as to improve tolerance to functional activity and return to PLOF.     Rehab Prognosis: Good; patient would benefit from acute skilled PT services to address these deficits and reach maximum level of function.    Recent Surgery: Procedure(s) (LRB):  XI ROBOTIC PANCREATECTOMY, DISTAL, robotic distal  pancreatectomy,splenectomy (N/A) 1 Day Post-Op    Plan:     During this hospitalization, patient to be seen 4 x/week to address the identified rehab impairments via gait training, therapeutic activities, therapeutic exercises, neuromuscular re-education and progress toward the following goals:    Plan of Care Expires:  05/23/25    Subjective     Chief Complaint: headache and fatigue   Patient/Family Comments/goals: Pt was agreeable to therapy this date   Pain/Comfort:  Pain Rating 1: 5/10  Location - Orientation 1: generalized  Location 1: head  Pain Addressed 1: Distraction    Patients cultural, spiritual, Adventism conflicts given the current situation: no    Living Environment:  Pt lives with spouse in a 2SH; however, he does not go up to the second story. Everything he needs is on the first floor. He has 0 JOHNNIE. Bathroom setup is a tub/shower. Pt is retired but still drives. Enjoys gardening.  Prior to admission, patients level of function was independent.  Equipment used at home: grab bar.  DME owned (not currently used): none.  Upon discharge, patient will have assistance from spouse.    Objective:     Communicated with nursing prior to session.  Patient found up in chair with arterial line, blood pressure cuff, vieira catheter, peripheral IV, pulse ox (continuous), telemetry  upon PT entry to room.    General Precautions: Standard, fall  Orthopedic Precautions:N/A   Braces: N/A  Respiratory Status: Room air    Exams:  Cognitive Exam:  Patient is oriented to Person, Place, Time, and Situation  RLE ROM: WFL  RLE Strength: WFL  LLE ROM: WFL  LLE Strength: WFL except ankle PF/DF not formally tested due to pt stating he had a broken foot; however, able to actively pump ankle with no issues     Functional Mobility:  Transfers:     Sit to Stand:  stand by assistance with no AD  Gait: ~8' to sink + 80' in hallways with SBA and no AD. Pt stood at sink for ~3 minutes between gait trials to complete ADLs. Pt  demonstrated slightly slowed gait speed with decreased step length bilaterally. No LOB  Balance: good      AM-PAC 6 CLICK MOBILITY  Total Score:18       Treatment & Education:  Discussed therapy rationale, POC, and goals. Answered all questions and addressed all concerns.     Patient left up in chair with all lines intact and call button in reach.    GOALS:   Multidisciplinary Problems       Physical Therapy Goals          Problem: Physical Therapy    Goal Priority Disciplines Outcome Interventions   Physical Therapy Goal     PT, PT/OT Progressing    Description: Goals to be met by: 2025     Patient will increase functional independence with mobility by performin. Supine to sit with Set-up [place]  2. Sit to supine with Set-up [place]  3. Rolling to Left and Right with Set-up Assistance.  4. Sit to stand transfer with Supervision  5. Bed to chair transfer with Supervision using No Assistive Device  6. Gait  x 200 feet with Supervision using No Assistive Device.   7. Lower extremity exercise program x15 reps per handout, with independence                         DME Justifications:  No DME recommended requiring DME justifications    History:     Past Medical History:   Diagnosis Date    Anemia     Arthritis     Cancer 2017    Bladder    Cancer related pain     Chronic diastolic CHF (congestive heart failure)     GERD (gastroesophageal reflux disease)     Headache(784.0)     Hypertension     Pacemaker     Pancreatic cancer     chemo    Paroxysmal atrial fibrillation     Prediabetes     Rash     Sleep apnea     no cpap       Past Surgical History:   Procedure Laterality Date    ABCESS DRAINAGE  2018    I&D with irrigation abcess left lower back    ABLATION      ATRIAL ABLATION SURGERY      x 2    BLADDER TUMOR EXCISION  2017     cysto/Cancer    CARDIAC PACEMAKER PLACEMENT      CARDIOVERSION      carpel tunnel Bilateral     CERVICAL FUSION      x3    CHOLECYSTECTOMY      COLONOSCOPY       ENDOSCOPIC ULTRASOUND OF UPPER GASTROINTESTINAL TRACT Left 09/13/2024    Procedure: ULTRASOUND, UPPER GI TRACT, ENDOSCOPIC;  Surgeon: Gerald Ochoa MD;  Location: [facility] ENDO;  Service: Endoscopy;  Laterality: Left;    ERCP N/A 09/20/2024    Procedure: ERCP (ENDOSCOPIC RETROGRADE CHOLANGIOPANCREATOGRAPHY);  Surgeon: Mariano Ferreira III, MD;  Location: [facility] ENDO;  Service: Endoscopy;  Laterality: N/A;    ESOPHAGUS SURGERY      ting and then reversal    FOOT FRACTURE SURGERY      FRACTURE SURGERY      INCISION AND DRAINAGE OF ABSCESS Left 06/01/2018    Procedure: INCISION AND DRAINAGE, ABSCESS - left, lower back;  Surgeon: Irving Edouard MD;  Location: [facility] OR;  Service: Orthopedics;  Laterality: Left;    INSERTION OF TUNNELED CENTRAL VENOUS CATHETER (CVC) WITH SUBCUTANEOUS PORT N/A 10/18/2024    Procedure: XKEHGIMIF-BADC-Y-CATH;  Surgeon: Irving Alicia MD;  Location: [facility] OR;  Service: General;  Laterality: N/A;    KNEE ARTHROSCOPY W/ MENISCAL REPAIR Left     ROBOT-ASSISTED LAPAROSCOPIC DISTAL PANCREATECTOMY USING DA DAI XI N/A 4/22/2025    Procedure: XI ROBOTIC PANCREATECTOMY, DISTAL, robotic distal pancreatectomy,splenectomy;  Surgeon: Matthew Butler MD;  Location: [address];  Service: General;  Laterality: N/A;    ROTATOR CUFF REPAIR Left        Time Tracking:     PT Received On: 04/23/25  PT Start Time: 0933     PT Stop Time: 0951  PT Total Time (min): 18 min     Billable Minutes: Evaluation 8 and Gait Training 10      04/23/2025

## 2025-04-23 NOTE — PLAN OF CARE
Cullen Rodriguez - Inpatient General Surgery  Initial Discharge Assessment       Primary Care Provider: Edmund Ko DO    Admission Diagnosis: Malignant neoplasm of pancreas, unspecified location of malignancy [C25.9]  Pancreatic adenocarcinoma [C25.9]    Admission Date: 4/22/2025  Expected Discharge Date: 4/28/2025    Transition of Care Barriers: None    Payor: MEDICARE / Plan: MEDICARE PART A & B / Product Type: Government /     Extended Emergency Contact Information  Primary Emergency Contact: Tia Benjamin  Address: [address] [place] [address] [place] [address] [facility]  Home Phone: [phone]  Mobile Phone: [phone]  Relation: Spouse  Secondary Emergency Contact: Valeriy Benjamin  Mobile Phone: [phone]  Relation: Brother    Discharge Plan A: Home with family, Home  Discharge Plan B: Home with family      [facility] - [place], [place] - [address]  [address]  [place] [address]  Phone: [phone] Fax: [phone]      Initial Assessment (most recent)       Adult Discharge Assessment - 04/23/25 1335          Discharge Assessment    Assessment Type Discharge Planning Assessment     Confirmed/corrected address, phone number and insurance Yes     Confirmed Demographics Correct on Facesheet     Source of Information patient;family   Spouse- Tia Benjamin- [phone]    If unable to respond/provide information was family/caregiver contacted? --   Spouse- Tia Benjamin- [phone]    Communicated RUSS with patient/caregiver Yes     Reason For Admission Pancreatic cancer     People in Home spouse   Spouse- Tia Benjamin- [phone]    Facility Arrived From: home     Do you expect to return to your current living situation? Yes     Do you have help at home or someone to help you manage your care at home? Yes     Who are your caregiver(s) and their phone number(s)? Spouse- Tia Benjamin- [phone]     Prior to  hospitilization cognitive status: Alert/Oriented     Current cognitive status: Alert/Oriented     Walking or Climbing Stairs Difficulty no     Dressing/Bathing Difficulty no     Do you have any problems with: --   Spouse- Tia Benjamin- [phone]    Home Accessibility wheelchair accessible     Home Layout Able to live on 1st floor     Equipment Currently Used at Home grab bar     Readmission within 30 days? No     Patient currently being followed by outpatient case management? No     Do you currently have service(s) that help you manage your care at home? No     Do you take prescription medications? Yes     Do you have prescription coverage? Yes     Coverage MEDICARE - MEDICARE PART A & B -/ [employer] [place]/ Medicare     Do you have any problems affording any of your prescribed medications? No     Is the patient taking medications as prescribed? yes     Who is going to help you get home at discharge? Spouse- Tia Benjamin- [phone]     How do you get to doctors appointments? family or friend will provide     Are you on dialysis? No     Do you take coumadin? No     Discharge Plan A Home with family;Home     Discharge Plan B Home with family     DME Needed Upon Discharge  other (see comments)   TBD    Discharge Plan discussed with: Patient;Spouse/sig other   Spouse- Tia Benjamin- [phone]    Name(s) and Number(s) SpouseJonny Benjamin- [phone]     Transition of Care Barriers None        Health Literacy    How often do you need to have someone help you when you read instructions, pamphlets, or other written material from your doctor or pharmacy? Never        OTHER    Name(s) of People in Home Spouse- Tia Benjamin- [phone]                   SW spoke with patient/spouse in [id number] for Discharge Planning Assessment. Per spouse, Pt lives with spouse in a single family home on a slab foundation with two steps to porch and point of entry.   Patient was independent with ADLS and DID use DME or in-home assistive equipment. Pt is not on dialysis  or coumadin,  takes medications as prescribed / keeps refilled / has resources for all daily and prescriptive needs. Preferred pharmacy is  [facility] - Agreeable to bedside delivery. Will have help from   and other immediate family upon discharge - spouse to provide transportation home.  All questions addressed. Unit and SW direct numbers provided. Will continue to follow for course of hospitalization       Discharge Plan A and Plan B have been determined by review of patient's clinical status, future medical and therapeutic needs, and coverage/benefits for post-acute care in coordination with multidisciplinary team members.         Shama Huynh, SW  - [facility]

## 2025-04-23 NOTE — PT/OT/SLP EVAL
Occupational Therapy   Evaluation    Name: Fred Benjamin  MRN: [id number]  Admitting Diagnosis: Pancreatic cancer  Recent Surgery: Procedure(s) (LRB):  XI ROBOTIC PANCREATECTOMY, DISTAL, robotic distal pancreatectomy,splenectomy (N/A) 1 Day Post-Op    Recommendations:     Discharge Recommendations: No Therapy Indicated  Discharge Equipment Recommendations:  none  Barriers to discharge:  None    Assessment:     Fred Benjamin is a 70 y.o. male with a medical diagnosis of Pancreatic cancer. Pt. currently demonstrates decreased (I) with ADLs, functional mobility & t/fs decreased overall strength, ROM, endurance and balance. Pt would benefit from skilled OT services to address these deficits and to facilitate improving (I) with daily tasks. Performance deficits affecting function: impaired endurance, decreased coordination, impaired self care skills, impaired functional mobility, gait instability, impaired balance, pain.      Rehab Prognosis: Good; patient would benefit from acute skilled OT services to address these deficits and reach maximum level of function.       Plan:     Patient to be seen 4 x/week to address the above listed problems via self-care/home management, therapeutic activities, therapeutic exercises  Plan of Care Expires: 05/23/25  Plan of Care Reviewed with: patient    Subjective     Occupational Profile:  Pt lives with spouse in a 2SH; however, he does not go up to the second story. Everything he needs is on the first floor. He has 0 JOHNNIE. Bathroom setup is a tub/shower. Pt is retired but still drives. Enjoys gardening.  Prior to admission, patients level of function was independent.    Equipment used at home: grab bar.  DME owned (not currently used): none.    Upon discharge, patient will have assistance from spouse.    Pain/Comfort:  Pain Rating 1: 5/10  Location 1: head    Patients cultural, spiritual, Jehovah's witness conflicts given the current situation:      Objective:     Communicated  with: rn prior to session.  Patient found up in chair with arterial line, telemetry, pulse ox (continuous), peripheral IV, vieira catheter upon OT entry to room.    General Precautions: Standard, fall  Orthopedic Precautions:    Braces:    Respiratory Status: Room air    Occupational Performance:    Bed Mobility:    Up in chair.    Functional Mobility/Transfers:  Patient completed Sit <> Stand Transfer with stand by assistance  with  no assistive device   Functional Mobility: Pt walked to the sink for ADLs then another 80' SBA with no AD.    Activities of Daily Living:  Feeding:  independence   Grooming: stand by assistance standing at the sink.  Lower Body Dressing: stand by assistance     Cognitive/Visual Perceptual:  Cognitive/Psychosocial Skills:     -       Oriented to: Person, Place, Time, and Situation   -       Safety awareness/insight to disability: intact     Physical Exam:  Upper Extremity Range of Motion:     -       Right Upper Extremity: WNL  -       Left Upper Extremity: WNL  Upper Extremity Strength:    -       Right Upper Extremity: WNL  -       Left Upper Extremity: WNL    AMPAC 6 Click ADL:  AMPAC Total Score: 21    Treatment & Education:  UE ROM/MMT  Bed mobility training / assessment  Functional mobility assessment  Sit/standing balance assessment  Educated on importance of sitting OOB in bedside chair to promote increased strength, endurance & breathing.  Discussed OT POC / Post-acute plan    Patient left up in chair with all lines intact and call button in reach    GOALS:   Multidisciplinary Problems       Occupational Therapy Goals          Problem: Occupational Therapy    Goal Priority Disciplines Outcome Interventions   Occupational Therapy Goal     OT, PT/OT Progressing    Description: Goals to be met by: 4/30/25    Patient will increase functional independence with ADLs by performing:    LE Dressing with Set-up Assistance.  Grooming while standing at sink with [place].  Toileting from  toilet with [place] for hygiene and clothing management.   Supine to sit with [place].  Step transfer with [place]  Toilet transfer to toilet with [place].                         DME Justifications:  No DME recommended requiring DME justifications    History:     Past Medical History:   Diagnosis Date    Anemia     Arthritis     Cancer 03/2017    Bladder    Cancer related pain     Chronic diastolic CHF (congestive heart failure)     GERD (gastroesophageal reflux disease)     Headache(784.0)     Hypertension     Pacemaker     Pancreatic cancer     chemo    Paroxysmal atrial fibrillation     Prediabetes     Rash     Sleep apnea     no cpap         Past Surgical History:   Procedure Laterality Date    ABCESS DRAINAGE  06/01/2018    I&D with irrigation abcess left lower back    ABLATION      ATRIAL ABLATION SURGERY      x 2    BLADDER TUMOR EXCISION  03/14/2017     cysto/Cancer    CARDIAC PACEMAKER PLACEMENT      CARDIOVERSION      carpel tunnel Bilateral     CERVICAL FUSION      x3    CHOLECYSTECTOMY      COLONOSCOPY      ENDOSCOPIC ULTRASOUND OF UPPER GASTROINTESTINAL TRACT Left 09/13/2024    Procedure: ULTRASOUND, UPPER GI TRACT, ENDOSCOPIC;  Surgeon: Gerald Ochoa MD;  Location: [facility];  Service: Endoscopy;  Laterality: Left;    ERCP N/A 09/20/2024    Procedure: ERCP (ENDOSCOPIC RETROGRADE CHOLANGIOPANCREATOGRAPHY);  Surgeon: Mariano Ferreira III, MD;  Location: [facility];  Service: Endoscopy;  Laterality: N/A;    ESOPHAGUS SURGERY      ting and then reversal    FOOT FRACTURE SURGERY      FRACTURE SURGERY      INCISION AND DRAINAGE OF ABSCESS Left 06/01/2018    Procedure: INCISION AND DRAINAGE, ABSCESS - left, lower back;  Surgeon: Irving Edouard MD;  Location: [facility];  Service: Orthopedics;  Laterality: Left;    INSERTION OF TUNNELED CENTRAL VENOUS CATHETER (CVC) WITH SUBCUTANEOUS PORT N/A 10/18/2024    Procedure: [id number]-[id number];  Surgeon: Irving Alicia MD;   Location: [facility] OR;  Service: General;  Laterality: N/A;    KNEE ARTHROSCOPY W/ MENISCAL REPAIR Left     ROBOT-ASSISTED LAPAROSCOPIC DISTAL PANCREATECTOMY USING DA DAI XI N/A 4/22/2025    Procedure: XI ROBOTIC PANCREATECTOMY, DISTAL, robotic distal pancreatectomy,splenectomy;  Surgeon: Matthew Butler MD;  Location: [address];  Service: General;  Laterality: N/A;    ROTATOR CUFF REPAIR Left        Time Tracking:     OT Date of Treatment: 04/23/25  OT Start Time: 0933  OT Stop Time: 0951  OT Total Time (min): 18 min    Billable Minutes:Evaluation 8  Self Care/Home Management 10    4/23/2025

## 2025-04-23 NOTE — SUBJECTIVE & OBJECTIVE
Interval History: NAEON. Pain well controlled. HTN, otherwise VSS. Lab work benign.    Medications:  Continuous Infusions:  Scheduled Meds:   acetaminophen  1,000 mg Intravenous Q8H    Followed by    acetaminophen  1,000 mg Oral Q8H    amLODIPine  10 mg Oral QHS    enoxparin  40 mg Subcutaneous Q24H (prophylaxis, 1700)    gabapentin  300 mg Oral TID    ketorolac  30 mg Intravenous Q6H    methocarbamol injection  750 mg Intravenous Q8H    morphine  60 mg Oral TID    mupirocin   Nasal BID    sotaloL  160 mg Oral Daily     PRN Meds:  Current Facility-Administered Medications:     hydrALAZINE, 10 mg, Intravenous, Q6H PRN    HYDROmorphone, 2 mg, Intravenous, Q4H PRN    labetalol, 10 mg, Intravenous, Q6H PRN    levalbuterol, 0.63 mg, Nebulization, Q6H PRN    ondansetron, 8 mg, Intravenous, Q6H PRN    oxyCODONE, 30 mg, Oral, Q6H PRN    promethazine (PHENERGAN) 6.25 mg in 0.9% NaCl 50 mL IVPB, 6.25 mg, Intravenous, Q6H PRN    sodium chloride 0.9%, 10 mL, Intra-Catheter, PRN     Review of patient's allergies indicates:   Allergen Reactions    Cleocin [clindamycin hcl] Shortness Of Breath     Constipation    Amiodarone     Amiodarone analogues      Affected thyroid    Dabigatran etexilate     Rythmol [propafenone]      Weight loss/ nausea     Objective:     Vital Signs (Most Recent):  Temp: 98 °F (36.7 °C) (04/23/25 0701)  Pulse: 69 (04/23/25 0800)  Resp: 12 (04/23/25 0842)  BP: (!) 191/95 (04/23/25 0800)  SpO2: 96 % (04/23/25 0800) Vital Signs (24h Range):  Temp:  [97.7 °F (36.5 °C)-98 °F (36.7 °C)] 98 °F (36.7 °C)  Pulse:  [60-76] 69  Resp:  [10-26] 12  SpO2:  [93 %-100 %] 96 %  BP: (131-214)/() 191/95  Arterial Line BP: (129-214)/() 173/89     Weight: 81.6 kg (180 lb)  Body mass index is 24.41 kg/m².    Intake/Output - Last 3 Shifts         04/21 0700 04/22 0659 04/22 0700 04/23 0659 04/23 0700 04/24 0659    IV Piggyback  2000     Total Intake(mL/kg)  2000 (24.5)     Urine (mL/kg/hr)  2525 200 (0.6)    Total  Output  252 200    Net  -525 -200                    Physical Exam  Vitals reviewed.   Constitutional:       Appearance: Normal appearance.   Cardiovascular:      Rate and Rhythm: Normal rate and regular rhythm.   Pulmonary:      Effort: Pulmonary effort is normal. No respiratory distress.   Abdominal:      Palpations: Abdomen is soft.      Comments: Appropriately tender, incisions healing well   Skin:     General: Skin is warm.   Neurological:      General: No focal deficit present.      Mental Status: He is alert and oriented to person, place, and time.          Significant Labs:  I have reviewed all pertinent lab results within the past 24 hours.  CBC:   Recent Labs   Lab 04/23/25  0400   WBC 16.07*   RBC 4.46*   HGB 12.2*   HCT 38.8*      MCV 87   MCH 27.4   MCHC 31.4*     CMP:   Recent Labs   Lab 04/23/25  0400   CALCIUM 8.7   ALBUMIN 3.2*      K 4.3   CO2 24      BUN 15   CREATININE 0.8   ALKPHOS 100   ALT 9*   AST 18   BILITOT 0.4       Significant Diagnostics:  I have reviewed all pertinent imaging results/findings within the past 24 hours.

## 2025-04-23 NOTE — PLAN OF CARE
PCU Plan of Care    Patient Dx: Pancreatic cancer    Vital Signs (last 12 hours):   Temp:  [97.8 °F (36.6 °C)-98.3 °F (36.8 °C)]   Pulse:  [53-76]   Resp:  [10-20]   BP: (117-191)/(80-98)   SpO2:  [93 %-97 %]   Arterial Line BP: (173)/(89)      Neuro: AAOx4, Follows Commands, and Moves all extremities spontaneously     Cardiac:  atrial paced (pacemaker)    Respiratory: Room Air    Frequent Checks: None     Urine Output: cathter removed. Due to void.     Diet: Clear Liquid     Mobility: Up to Chair , Ambulated in Room , and Ambulated in Man; Assistance Required: Stand-by Assist      Skin:    Patient turned q2h, bony prominences protected, and mattress inflated/working correctly.   Ángel Score: 19.    Shift Events:  See flowsheet for further assessment/details.  Family updated on current condition/plan of care, questions answered, and emotional support provided.  MD updated on current condition, vitals, labs, and gtts.          Nurses Note -- 4 Eyes  4/23/2025   6:20 PM      Skin assessed during: Routine Shift Assessment       [x] No Altered Skin Integrity Present    []Prevention Measures Documented      [] Yes- Altered Skin Integrity Present or Discovered   [] LDA Added if Not in Epic (Describe Wound)   [] New Altered Skin Integrity was Present on Admit and Documented in LDA   [] Wound Image Taken    Wound Care Consulted? No    Attending Nurse: ANALY Martinez    Second RN/Staff Member: ANALY Hansen

## 2025-04-23 NOTE — PLAN OF CARE
Problem: Physical Therapy  Goal: Physical Therapy Goal  Description: Goals to be met by: 2025     Patient will increase functional independence with mobility by performin. Supine to sit with Set-up [place]  2. Sit to supine with Set-up [place]  3. Rolling to Left and Right with Set-up Assistance.  4. Sit to stand transfer with Supervision  5. Bed to chair transfer with Supervision using No Assistive Device  6. Gait  x 200 feet with Supervision using No Assistive Device.   7. Lower extremity exercise program x15 reps per handout, with independence    Outcome: Progressing

## 2025-04-23 NOTE — PLAN OF CARE
PCU Plan of Care    Patient Dx: Malignant neoplasm of pancreas; s/p robotic pancreectomy/ splenectomy    Vital Signs (last 12 hours): see flowsheet.    Neuro: AOx4    Cardiac: NSR/ A paced    Respiratory: Room air    Gtts: PCA    Frequent Checks: none    Urine Output: 1200ml/shift vieira    Drains: None    Diet: Clear liquid    Mobility: ; Assistance Required: 1-person Assistance      Skin:   Patient turned q2h, bony prominences protected, and mattress inflated/working correctly.   Ángel Score: 18.    Shift Events:   See flowsheet for further assessment/details.  Family updated on current condition/plan of care, questions answered, and emotional support provided.  MD updated on current condition, vitals, labs, and gtts.          Nurses Note -- 4 Eyes  4/23/2025   5:23 AM      Skin assessed during: Routine Shift Assessment       [x] No Altered Skin Integrity Present    [x]Prevention Measures Documented      [] Yes- Altered Skin Integrity Present or Discovered   [] LDA Added if Not in Epic (Describe Wound)   [] New Altered Skin Integrity was Present on Admit and Documented in LDA   [] Wound Image Taken    Wound Care Consulted? No    Attending Nurse:  ANALY Bautista    Second RN/Staff Member: ANALY Rodriguez

## 2025-04-23 NOTE — PROGRESS NOTES
[facility]  General Surgery  Progress Note    Subjective:     History of Present Illness:  No notes on file    Post-Op Info:  Procedure(s) (LRB):  XI ROBOTIC PANCREATECTOMY, DISTAL, robotic distal pancreatectomy,splenectomy (N/A)   1 Day Post-Op     Interval History: NAEON. Pain well controlled. HTN, otherwise VSS. Lab work benign.    Medications:  Continuous Infusions:  Scheduled Meds:   acetaminophen  1,000 mg Intravenous Q8H    Followed by    acetaminophen  1,000 mg Oral Q8H    amLODIPine  10 mg Oral QHS    enoxparin  40 mg Subcutaneous Q24H (prophylaxis, 1700)    gabapentin  300 mg Oral TID    ketorolac  30 mg Intravenous Q6H    methocarbamol injection  750 mg Intravenous Q8H    morphine  60 mg Oral TID    mupirocin   Nasal BID    sotaloL  160 mg Oral Daily     PRN Meds:  Current Facility-Administered Medications:     hydrALAZINE, 10 mg, Intravenous, Q6H PRN    HYDROmorphone, 2 mg, Intravenous, Q4H PRN    labetalol, 10 mg, Intravenous, Q6H PRN    levalbuterol, 0.63 mg, Nebulization, Q6H PRN    ondansetron, 8 mg, Intravenous, Q6H PRN    oxyCODONE, 30 mg, Oral, Q6H PRN    promethazine (PHENERGAN) 6.25 mg in 0.9% NaCl 50 mL IVPB, 6.25 mg, Intravenous, Q6H PRN    sodium chloride 0.9%, 10 mL, Intra-Catheter, PRN     Review of patient's allergies indicates:   Allergen Reactions    Cleocin [clindamycin hcl] Shortness Of Breath     Constipation    Amiodarone     Amiodarone analogues      Affected thyroid    Dabigatran etexilate     Rythmol [propafenone]      Weight loss/ nausea     Objective:     Vital Signs (Most Recent):  Temp: 98 °F (36.7 °C) (04/23/25 0701)  Pulse: 69 (04/23/25 0800)  Resp: 12 (04/23/25 0842)  BP: (!) 191/95 (04/23/25 0800)  SpO2: 96 % (04/23/25 0800) Vital Signs (24h Range):  Temp:  [97.7 °F (36.5 °C)-98 °F (36.7 °C)] 98 °F (36.7 °C)  Pulse:  [60-76] 69  Resp:  [10-26] 12  SpO2:  [93 %-100 %] 96 %  BP: (131-214)/() 191/95  Arterial Line BP: (129-214)/() 173/89      Weight: 81.6 kg (180 lb)  Body mass index is 24.41 kg/m².    Intake/Output - Last 3 Shifts         04/21 0700  04/22 0659 04/22 0700 04/23 0659 04/23 0700 04/24 0659    IV Piggyback  2000     Total Intake(mL/kg)  2000 (24.5)     Urine (mL/kg/hr)  2525 200 (0.6)    Total Output  2525 200    Net  -525 -200                    Physical Exam  Vitals reviewed.   Constitutional:       Appearance: Normal appearance.   Cardiovascular:      Rate and Rhythm: Normal rate and regular rhythm.   Pulmonary:      Effort: Pulmonary effort is normal. No respiratory distress.   Abdominal:      Palpations: Abdomen is soft.      Comments: Appropriately tender, incisions healing well   Skin:     General: Skin is warm.   Neurological:      General: No focal deficit present.      Mental Status: He is alert and oriented to person, place, and time.          Significant Labs:  I have reviewed all pertinent lab results within the past 24 hours.  CBC:   Recent Labs   Lab 04/23/25  0400   WBC 16.07*   RBC 4.46*   HGB 12.2*   HCT 38.8*      MCV 87   MCH 27.4   MCHC 31.4*     CMP:   Recent Labs   Lab 04/23/25  0400   CALCIUM 8.7   ALBUMIN 3.2*      K 4.3   CO2 24      BUN 15   CREATININE 0.8   ALKPHOS 100   ALT 9*   AST 18   BILITOT 0.4       Significant Diagnostics:  I have reviewed all pertinent imaging results/findings within the past 24 hours.  Assessment/Plan:     * Pancreatic cancer  70 yoM with pancreatic adenocarcinoma of the tail of the pancreas s/p robotic distal pancreatectomy and splenectomy on 4/22. On pathway.    - Ok for stepdown  - Discontinue art line and vieira  - Continue clear liquid diet  - Discontinue PCA  - Restart home MS contin and prn IR oxy doses with breakthrough dilaudid  - Continue MMPC  - Ambulate and up to the chair 3x per day  - PT/OT  - Morning labs        Darrick Heard MD  General Surgery  [facility]

## 2025-04-23 NOTE — PLAN OF CARE
SW Attempted to see patient for assessment and discuss discharge plan ; however staff was at bedside on attempts. SW will follow-up.        Shama Huynh, DAYANARA  - [facility]

## 2025-04-24 LAB
ABSOLUTE EOSINOPHIL (OHS): 0.07 K/UL
ABSOLUTE MONOCYTE (OHS): 1 K/UL (ref 0.3–1)
ABSOLUTE NEUTROPHIL COUNT (OHS): 11.11 K/UL (ref 1.8–7.7)
ALBUMIN SERPL BCP-MCNC: 3.2 G/DL (ref 3.5–5.2)
ALP SERPL-CCNC: 93 UNIT/L (ref 40–150)
ALT SERPL W/O P-5'-P-CCNC: 5 UNIT/L (ref 10–44)
ANION GAP (OHS): 9 MMOL/L (ref 8–16)
AST SERPL-CCNC: 15 UNIT/L (ref 11–45)
BASOPHILS # BLD AUTO: 0.07 K/UL
BASOPHILS NFR BLD AUTO: 0.5 %
BILIRUB SERPL-MCNC: 0.5 MG/DL (ref 0.1–1)
BUN SERPL-MCNC: 19 MG/DL (ref 8–23)
CALCIUM SERPL-MCNC: 8.8 MG/DL (ref 8.7–10.5)
CHLORIDE SERPL-SCNC: 104 MMOL/L (ref 95–110)
CO2 SERPL-SCNC: 24 MMOL/L (ref 23–29)
CREAT SERPL-MCNC: 0.7 MG/DL (ref 0.5–1.4)
ERYTHROCYTE [DISTWIDTH] IN BLOOD BY AUTOMATED COUNT: 14 % (ref 11.5–14.5)
GFR SERPLBLD CREATININE-BSD FMLA CKD-EPI: >60 ML/MIN/1.73/M2
GLUCOSE SERPL-MCNC: 106 MG/DL (ref 70–110)
HCT VFR BLD AUTO: 39.5 % (ref 40–54)
HGB BLD-MCNC: 12.4 GM/DL (ref 14–18)
IMM GRANULOCYTES # BLD AUTO: 0.06 K/UL (ref 0–0.04)
IMM GRANULOCYTES NFR BLD AUTO: 0.4 % (ref 0–0.5)
LYMPHOCYTES # BLD AUTO: 1.44 K/UL (ref 1–4.8)
MAGNESIUM SERPL-MCNC: 2 MG/DL (ref 1.6–2.6)
MCH RBC QN AUTO: 27 PG (ref 27–31)
MCHC RBC AUTO-ENTMCNC: 31.4 G/DL (ref 32–36)
MCV RBC AUTO: 86 FL (ref 82–98)
NUCLEATED RBC (/100WBC) (OHS): 0 /100 WBC
PHOSPHATE SERPL-MCNC: 2.6 MG/DL (ref 2.7–4.5)
PLATELET # BLD AUTO: 250 K/UL (ref 150–450)
PMV BLD AUTO: 9.6 FL (ref 9.2–12.9)
POTASSIUM SERPL-SCNC: 3.8 MMOL/L (ref 3.5–5.1)
PROT SERPL-MCNC: 7.1 GM/DL (ref 6–8.4)
RBC # BLD AUTO: 4.6 M/UL (ref 4.6–6.2)
RELATIVE EOSINOPHIL (OHS): 0.5 %
RELATIVE LYMPHOCYTE (OHS): 10.5 % (ref 18–48)
RELATIVE MONOCYTE (OHS): 7.3 % (ref 4–15)
RELATIVE NEUTROPHIL (OHS): 80.8 % (ref 38–73)
SODIUM SERPL-SCNC: 137 MMOL/L (ref 136–145)
WBC # BLD AUTO: 13.75 K/UL (ref 3.9–12.7)

## 2025-04-24 PROCEDURE — 94761 N-INVAS EAR/PLS OXIMETRY MLT: CPT

## 2025-04-24 PROCEDURE — 25000003 PHARM REV CODE 250

## 2025-04-24 PROCEDURE — 25000003 PHARM REV CODE 250: Performed by: COMMUNITY HEALTH WORKER

## 2025-04-24 PROCEDURE — 25000003 PHARM REV CODE 250: Performed by: STUDENT IN AN ORGANIZED HEALTH CARE EDUCATION/TRAINING PROGRAM

## 2025-04-24 PROCEDURE — 11000001 HC ACUTE MED/SURG PRIVATE ROOM

## 2025-04-24 PROCEDURE — 80053 COMPREHEN METABOLIC PANEL: CPT | Performed by: STUDENT IN AN ORGANIZED HEALTH CARE EDUCATION/TRAINING PROGRAM

## 2025-04-24 PROCEDURE — 83735 ASSAY OF MAGNESIUM: CPT | Performed by: STUDENT IN AN ORGANIZED HEALTH CARE EDUCATION/TRAINING PROGRAM

## 2025-04-24 PROCEDURE — 85025 COMPLETE CBC W/AUTO DIFF WBC: CPT | Performed by: STUDENT IN AN ORGANIZED HEALTH CARE EDUCATION/TRAINING PROGRAM

## 2025-04-24 PROCEDURE — 84100 ASSAY OF PHOSPHORUS: CPT | Performed by: STUDENT IN AN ORGANIZED HEALTH CARE EDUCATION/TRAINING PROGRAM

## 2025-04-24 PROCEDURE — 36415 COLL VENOUS BLD VENIPUNCTURE: CPT | Performed by: STUDENT IN AN ORGANIZED HEALTH CARE EDUCATION/TRAINING PROGRAM

## 2025-04-24 PROCEDURE — 63600175 PHARM REV CODE 636 W HCPCS: Performed by: STUDENT IN AN ORGANIZED HEALTH CARE EDUCATION/TRAINING PROGRAM

## 2025-04-24 RX ORDER — SODIUM,POTASSIUM PHOSPHATES 280-250MG
2 POWDER IN PACKET (EA) ORAL ONCE
Status: COMPLETED | OUTPATIENT
Start: 2025-04-24 | End: 2025-04-24

## 2025-04-24 RX ORDER — CELECOXIB 200 MG/1
200 CAPSULE ORAL 2 TIMES DAILY
Status: DISCONTINUED | OUTPATIENT
Start: 2025-04-24 | End: 2025-04-25 | Stop reason: HOSPADM

## 2025-04-24 RX ORDER — AMOXICILLIN 250 MG
1 CAPSULE ORAL 2 TIMES DAILY
Status: DISCONTINUED | OUTPATIENT
Start: 2025-04-24 | End: 2025-04-25 | Stop reason: HOSPADM

## 2025-04-24 RX ORDER — POLYETHYLENE GLYCOL 3350 17 G/17G
17 POWDER, FOR SOLUTION ORAL DAILY
Status: DISCONTINUED | OUTPATIENT
Start: 2025-04-24 | End: 2025-04-25 | Stop reason: HOSPADM

## 2025-04-24 RX ADMIN — GABAPENTIN 300 MG: 300 CAPSULE ORAL at 08:04

## 2025-04-24 RX ADMIN — GABAPENTIN 300 MG: 300 CAPSULE ORAL at 02:04

## 2025-04-24 RX ADMIN — POLYETHYLENE GLYCOL 3350 17 G: 17 POWDER, FOR SOLUTION ORAL at 08:04

## 2025-04-24 RX ADMIN — MUPIROCIN: 20 OINTMENT TOPICAL at 08:04

## 2025-04-24 RX ADMIN — OXYCODONE HYDROCHLORIDE 30 MG: 10 TABLET ORAL at 12:04

## 2025-04-24 RX ADMIN — MORPHINE SULFATE 60 MG: 30 TABLET, FILM COATED, EXTENDED RELEASE ORAL at 08:04

## 2025-04-24 RX ADMIN — METHOCARBAMOL 750 MG: 100 INJECTION, SOLUTION INTRAMUSCULAR; INTRAVENOUS at 05:04

## 2025-04-24 RX ADMIN — CELECOXIB 200 MG: 200 CAPSULE ORAL at 08:04

## 2025-04-24 RX ADMIN — METHOCARBAMOL 750 MG: 100 INJECTION, SOLUTION INTRAMUSCULAR; INTRAVENOUS at 02:04

## 2025-04-24 RX ADMIN — ENOXAPARIN SODIUM 40 MG: 40 INJECTION SUBCUTANEOUS at 04:04

## 2025-04-24 RX ADMIN — METHOCARBAMOL 750 MG: 100 INJECTION, SOLUTION INTRAMUSCULAR; INTRAVENOUS at 11:04

## 2025-04-24 RX ADMIN — POTASSIUM & SODIUM PHOSPHATES POWDER PACK 280-160-250 MG 2 PACKET: 280-160-250 PACK at 08:04

## 2025-04-24 RX ADMIN — SOTALOL HYDROCHLORIDE 80 MG: 80 TABLET ORAL at 08:04

## 2025-04-24 RX ADMIN — ACETAMINOPHEN 1000 MG: 500 TABLET ORAL at 08:04

## 2025-04-24 RX ADMIN — SENNOSIDES AND DOCUSATE SODIUM 1 TABLET: 50; 8.6 TABLET ORAL at 08:04

## 2025-04-24 RX ADMIN — MORPHINE SULFATE 60 MG: 30 TABLET, FILM COATED, EXTENDED RELEASE ORAL at 02:04

## 2025-04-24 RX ADMIN — ACETAMINOPHEN 1000 MG: 500 TABLET ORAL at 12:04

## 2025-04-24 RX ADMIN — KETOROLAC TROMETHAMINE 30 MG: 15 INJECTION, SOLUTION INTRAMUSCULAR; INTRAVENOUS at 05:04

## 2025-04-24 RX ADMIN — ACETAMINOPHEN 1000 MG: 500 TABLET ORAL at 05:04

## 2025-04-24 RX ADMIN — AMLODIPINE BESYLATE 10 MG: 10 TABLET ORAL at 08:04

## 2025-04-24 RX ADMIN — OXYCODONE HYDROCHLORIDE 30 MG: 10 TABLET ORAL at 07:04

## 2025-04-24 NOTE — ASSESSMENT & PLAN NOTE
70 yoM with pancreatic adenocarcinoma of the tail of the pancreas s/p robotic distal pancreatectomy and splenectomy on 4/22. On pathway.    - Replace phos  - Ok for stepdown  - Advance to regular diet  - Continue MMPC, including home narcotics  - Ambulate and up to the chair 3x per day  - PT/OT  - Morning labs    Dispo: likely discharge tomorrow

## 2025-04-24 NOTE — PROGRESS NOTES
[facility]  General Surgery  Progress Note    Subjective:     History of Present Illness:  No notes on file    Post-Op Info:  Procedure(s) (LRB):  XI ROBOTIC PANCREATECTOMY, DISTAL, robotic distal pancreatectomy,splenectomy (N/A)   2 Days Post-Op     Interval History: NAEON. Pain well controlled. AVSS. Lab work benign.    Medications:  Continuous Infusions:  Scheduled Meds:   acetaminophen  1,000 mg Oral Q8H    amLODIPine  10 mg Oral QHS    celecoxib  200 mg Oral BID    enoxparin  40 mg Subcutaneous Q24H (prophylaxis, 1700)    gabapentin  300 mg Oral TID    methocarbamol injection  750 mg Intravenous Q8H    morphine  60 mg Oral TID    mupirocin   Nasal BID    polyethylene glycol  17 g Oral Daily    senna-docusate  1 tablet Oral BID    sotaloL  80 mg Oral Daily     PRN Meds:  Current Facility-Administered Medications:     hydrALAZINE, 20 mg, Intravenous, Q6H PRN    HYDROmorphone, 2 mg, Intravenous, Q4H PRN    labetalol, 10 mg, Intravenous, Q6H PRN    levalbuterol, 0.63 mg, Nebulization, Q6H PRN    ondansetron, 8 mg, Intravenous, Q6H PRN    oxyCODONE, 30 mg, Oral, Q6H PRN    promethazine (PHENERGAN) 6.25 mg in 0.9% NaCl 50 mL IVPB, 6.25 mg, Intravenous, Q6H PRN    sodium chloride 0.9%, 10 mL, Intra-Catheter, PRN     Review of patient's allergies indicates:   Allergen Reactions    Cleocin [clindamycin hcl] Shortness Of Breath     Constipation    Amiodarone     Amiodarone analogues      Affected thyroid    Dabigatran etexilate     Rythmol [propafenone]      Weight loss/ nausea     Objective:     Vital Signs (Most Recent):  Temp: 98.1 °F (36.7 °C) (04/24/25 0701)  Pulse: 63 (04/24/25 0723)  Resp: 18 (04/24/25 0723)  BP: (!) 140/92 (04/24/25 0701)  SpO2: 95 % (04/24/25 0723) Vital Signs (24h Range):  Temp:  [97.8 °F (36.6 °C)-98.4 °F (36.9 °C)] 98.1 °F (36.7 °C)  Pulse:  [53-90] 63  Resp:  [10-20] 18  SpO2:  [93 %-97 %] 95 %  BP: (117-186)/(72-99) 140/92     Weight: 81.6 kg (180 lb)  Body mass  index is 24.41 kg/m².    Intake/Output - Last 3 Shifts         04/22 0700  04/23 0659 04/23 0700 04/24 0659 04/24 0700 04/25 0659    IV Piggyback 2000      Total Intake(mL/kg) 2000 (24.5)      Urine (mL/kg/hr) 2525 1155 (0.6)     Total Output 2525 1155     Net -525 -1155                     Physical Exam  Vitals reviewed.   Constitutional:       Appearance: Normal appearance.   Cardiovascular:      Rate and Rhythm: Normal rate and regular rhythm.   Pulmonary:      Effort: Pulmonary effort is normal. No respiratory distress.   Abdominal:      Palpations: Abdomen is soft.      Comments: Appropriately tender, incisions healing well   Skin:     General: Skin is warm.   Neurological:      General: No focal deficit present.      Mental Status: He is alert and oriented to person, place, and time.          Significant Labs:  I have reviewed all pertinent lab results within the past 24 hours.  CBC:   Recent Labs   Lab 04/24/25  0600   WBC 13.75*   RBC 4.60   HGB 12.4*   HCT 39.5*      MCV 86   MCH 27.0   MCHC 31.4*     CMP:   Recent Labs   Lab 04/24/25  0600   CALCIUM 8.8   ALBUMIN 3.2*      K 3.8   CO2 24      BUN 19   CREATININE 0.7   ALKPHOS 93   ALT 5*   AST 15   BILITOT 0.5       Significant Diagnostics:  I have reviewed all pertinent imaging results/findings within the past 24 hours.  Assessment/Plan:     * Pancreatic cancer  70 yoM with pancreatic adenocarcinoma of the tail of the pancreas s/p robotic distal pancreatectomy and splenectomy on 4/22. On pathway.    - Replace phos  - Ok for stepdown  - Advance to regular diet  - Continue MMPC, including home narcotics  - Ambulate and up to the chair 3x per day  - PT/OT  - Morning labs    Dispo: likely discharge tomorrow        Darrick Heard MD  General Surgery  [facility]

## 2025-04-24 NOTE — PLAN OF CARE
PCU Plan of Care    Patient Dx: Malignant neoplasm of pancreas; s/p robotic pancreectomy/ splenectomy    Vital Signs (last 12 hours): see flowsheet.    Neuro: AOx4    Cardiac: NSR/ A paced    Respiratory: Room air    Gtts: None    Frequent Checks: none    Urine Output: Voids spontaneously 715ml/shift    Drains: None    Diet: Clear liquid    Mobility: ; Assistance Required: 1-person Assistance      Skin:   Patient turned q2h, bony prominences protected, and mattress inflated/working correctly.   Ángel Score: 19.    Shift Events:   See flowsheet for further assessment/details.  Family updated on current condition/plan of care, questions answered, and emotional support provided.  MD updated on current condition, vitals, labs, and gtts.          Nurses Note -- 4 Eyes  4/24/2025   5:56 AM      Skin assessed during: Routine Shift Assessment       [x] No Altered Skin Integrity Present    [x]Prevention Measures Documented      [] Yes- Altered Skin Integrity Present or Discovered   [] LDA Added if Not in Epic (Describe Wound)   [] New Altered Skin Integrity was Present on Admit and Documented in LDA   [] Wound Image Taken    Wound Care Consulted? No    Attending Nurse:  ANALY Bautista    Second RN/Staff Member: ANALY [facility]

## 2025-04-24 NOTE — SUBJECTIVE & OBJECTIVE
Interval History: NAEON. Pain well controlled. AVSS. Lab work benign.    Medications:  Continuous Infusions:  Scheduled Meds:   acetaminophen  1,000 mg Oral Q8H    amLODIPine  10 mg Oral QHS    celecoxib  200 mg Oral BID    enoxparin  40 mg Subcutaneous Q24H (prophylaxis, 1700)    gabapentin  300 mg Oral TID    methocarbamol injection  750 mg Intravenous Q8H    morphine  60 mg Oral TID    mupirocin   Nasal BID    polyethylene glycol  17 g Oral Daily    senna-docusate  1 tablet Oral BID    sotaloL  80 mg Oral Daily     PRN Meds:  Current Facility-Administered Medications:     hydrALAZINE, 20 mg, Intravenous, Q6H PRN    HYDROmorphone, 2 mg, Intravenous, Q4H PRN    labetalol, 10 mg, Intravenous, Q6H PRN    levalbuterol, 0.63 mg, Nebulization, Q6H PRN    ondansetron, 8 mg, Intravenous, Q6H PRN    oxyCODONE, 30 mg, Oral, Q6H PRN    promethazine (PHENERGAN) 6.25 mg in 0.9% NaCl 50 mL IVPB, 6.25 mg, Intravenous, Q6H PRN    sodium chloride 0.9%, 10 mL, Intra-Catheter, PRN     Review of patient's allergies indicates:   Allergen Reactions    Cleocin [clindamycin hcl] Shortness Of Breath     Constipation    Amiodarone     Amiodarone analogues      Affected thyroid    Dabigatran etexilate     Rythmol [propafenone]      Weight loss/ nausea     Objective:     Vital Signs (Most Recent):  Temp: 98.1 °F (36.7 °C) (04/24/25 0701)  Pulse: 63 (04/24/25 0723)  Resp: 18 (04/24/25 0723)  BP: (!) 140/92 (04/24/25 0701)  SpO2: 95 % (04/24/25 0723) Vital Signs (24h Range):  Temp:  [97.8 °F (36.6 °C)-98.4 °F (36.9 °C)] 98.1 °F (36.7 °C)  Pulse:  [53-90] 63  Resp:  [10-20] 18  SpO2:  [93 %-97 %] 95 %  BP: (117-186)/(72-99) 140/92     Weight: 81.6 kg (180 lb)  Body mass index is 24.41 kg/m².    Intake/Output - Last 3 Shifts         04/22 0700 04/23 0659 04/23 0700 04/24 0659 04/24 0700 04/25 0659    IV Piggyback 2000      Total Intake(mL/kg) 2000 (24.5)      Urine (mL/kg/hr) 2525 1155 (0.6)     Total Output 2524 1158     Net -559 -0777                      Physical Exam  Vitals reviewed.   Constitutional:       Appearance: Normal appearance.   Cardiovascular:      Rate and Rhythm: Normal rate and regular rhythm.   Pulmonary:      Effort: Pulmonary effort is normal. No respiratory distress.   Abdominal:      Palpations: Abdomen is soft.      Comments: Appropriately tender, incisions healing well   Skin:     General: Skin is warm.   Neurological:      General: No focal deficit present.      Mental Status: He is alert and oriented to person, place, and time.          Significant Labs:  I have reviewed all pertinent lab results within the past 24 hours.  CBC:   Recent Labs   Lab 04/24/25  0600   WBC 13.75*   RBC 4.60   HGB 12.4*   HCT 39.5*      MCV 86   MCH 27.0   MCHC 31.4*     CMP:   Recent Labs   Lab 04/24/25  0600   CALCIUM 8.8   ALBUMIN 3.2*      K 3.8   CO2 24      BUN 19   CREATININE 0.7   ALKPHOS 93   ALT 5*   AST 15   BILITOT 0.5       Significant Diagnostics:  I have reviewed all pertinent imaging results/findings within the past 24 hours.

## 2025-04-25 VITALS
SYSTOLIC BLOOD PRESSURE: 106 MMHG | WEIGHT: 180 LBS | TEMPERATURE: 98 F | OXYGEN SATURATION: 95 % | BODY MASS INDEX: 24.38 KG/M2 | RESPIRATION RATE: 19 BRPM | DIASTOLIC BLOOD PRESSURE: 78 MMHG | HEART RATE: 75 BPM | HEIGHT: 72 IN

## 2025-04-25 LAB
ABSOLUTE EOSINOPHIL (OHS): 0.26 K/UL
ABSOLUTE MONOCYTE (OHS): 1.15 K/UL (ref 0.3–1)
ABSOLUTE NEUTROPHIL COUNT (OHS): 7.1 K/UL (ref 1.8–7.7)
ALBUMIN SERPL BCP-MCNC: 2.9 G/DL (ref 3.5–5.2)
ALP SERPL-CCNC: 80 UNIT/L (ref 40–150)
ALT SERPL W/O P-5'-P-CCNC: 6 UNIT/L (ref 10–44)
ANION GAP (OHS): 7 MMOL/L (ref 8–16)
AST SERPL-CCNC: 13 UNIT/L (ref 11–45)
BASOPHILS # BLD AUTO: 0.08 K/UL
BASOPHILS NFR BLD AUTO: 0.8 %
BILIRUB SERPL-MCNC: 0.4 MG/DL (ref 0.1–1)
BUN SERPL-MCNC: 27 MG/DL (ref 8–23)
CALCIUM SERPL-MCNC: 8.5 MG/DL (ref 8.7–10.5)
CHLORIDE SERPL-SCNC: 104 MMOL/L (ref 95–110)
CO2 SERPL-SCNC: 25 MMOL/L (ref 23–29)
CREAT SERPL-MCNC: 0.9 MG/DL (ref 0.5–1.4)
ERYTHROCYTE [DISTWIDTH] IN BLOOD BY AUTOMATED COUNT: 14 % (ref 11.5–14.5)
GFR SERPLBLD CREATININE-BSD FMLA CKD-EPI: >60 ML/MIN/1.73/M2
GLUCOSE SERPL-MCNC: 92 MG/DL (ref 70–110)
HCT VFR BLD AUTO: 36.5 % (ref 40–54)
HGB BLD-MCNC: 11.4 GM/DL (ref 14–18)
IMM GRANULOCYTES # BLD AUTO: 0.02 K/UL (ref 0–0.04)
IMM GRANULOCYTES NFR BLD AUTO: 0.2 % (ref 0–0.5)
LYMPHOCYTES # BLD AUTO: 2.05 K/UL (ref 1–4.8)
MAGNESIUM SERPL-MCNC: 2 MG/DL (ref 1.6–2.6)
MCH RBC QN AUTO: 27.3 PG (ref 27–31)
MCHC RBC AUTO-ENTMCNC: 31.2 G/DL (ref 32–36)
MCV RBC AUTO: 88 FL (ref 82–98)
NUCLEATED RBC (/100WBC) (OHS): 0 /100 WBC
PHOSPHATE SERPL-MCNC: 3.3 MG/DL (ref 2.7–4.5)
PLATELET # BLD AUTO: 241 K/UL (ref 150–450)
PMV BLD AUTO: 10.4 FL (ref 9.2–12.9)
POTASSIUM SERPL-SCNC: 3.7 MMOL/L (ref 3.5–5.1)
PROT SERPL-MCNC: 6.5 GM/DL (ref 6–8.4)
RBC # BLD AUTO: 4.17 M/UL (ref 4.6–6.2)
RELATIVE EOSINOPHIL (OHS): 2.4 %
RELATIVE LYMPHOCYTE (OHS): 19.2 % (ref 18–48)
RELATIVE MONOCYTE (OHS): 10.8 % (ref 4–15)
RELATIVE NEUTROPHIL (OHS): 66.6 % (ref 38–73)
SODIUM SERPL-SCNC: 136 MMOL/L (ref 136–145)
WBC # BLD AUTO: 10.66 K/UL (ref 3.9–12.7)

## 2025-04-25 PROCEDURE — 84100 ASSAY OF PHOSPHORUS: CPT | Performed by: STUDENT IN AN ORGANIZED HEALTH CARE EDUCATION/TRAINING PROGRAM

## 2025-04-25 PROCEDURE — 63600175 PHARM REV CODE 636 W HCPCS

## 2025-04-25 PROCEDURE — 85025 COMPLETE CBC W/AUTO DIFF WBC: CPT | Performed by: STUDENT IN AN ORGANIZED HEALTH CARE EDUCATION/TRAINING PROGRAM

## 2025-04-25 PROCEDURE — 63600175 PHARM REV CODE 636 W HCPCS: Performed by: STUDENT IN AN ORGANIZED HEALTH CARE EDUCATION/TRAINING PROGRAM

## 2025-04-25 PROCEDURE — 83735 ASSAY OF MAGNESIUM: CPT | Performed by: STUDENT IN AN ORGANIZED HEALTH CARE EDUCATION/TRAINING PROGRAM

## 2025-04-25 PROCEDURE — 25000003 PHARM REV CODE 250: Performed by: STUDENT IN AN ORGANIZED HEALTH CARE EDUCATION/TRAINING PROGRAM

## 2025-04-25 PROCEDURE — 25000003 PHARM REV CODE 250

## 2025-04-25 PROCEDURE — 80053 COMPREHEN METABOLIC PANEL: CPT | Performed by: STUDENT IN AN ORGANIZED HEALTH CARE EDUCATION/TRAINING PROGRAM

## 2025-04-25 RX ORDER — AMOXICILLIN 250 MG
1 CAPSULE ORAL 2 TIMES DAILY
Qty: 30 TABLET | Refills: 0 | Status: SHIPPED | OUTPATIENT
Start: 2025-04-25

## 2025-04-25 RX ORDER — GABAPENTIN 300 MG/1
300 CAPSULE ORAL 3 TIMES DAILY
Qty: 42 CAPSULE | Refills: 0 | Status: SHIPPED | OUTPATIENT
Start: 2025-04-25 | End: 2025-05-09

## 2025-04-25 RX ORDER — ACETAMINOPHEN 500 MG
1000 TABLET ORAL EVERY 8 HOURS
COMMUNITY
Start: 2025-04-25

## 2025-04-25 RX ORDER — CELECOXIB 200 MG/1
200 CAPSULE ORAL 2 TIMES DAILY
Qty: 20 CAPSULE | Refills: 0 | Status: SHIPPED | OUTPATIENT
Start: 2025-04-25

## 2025-04-25 RX ORDER — POLYETHYLENE GLYCOL 3350 17 G/17G
17 POWDER, FOR SOLUTION ORAL DAILY
COMMUNITY
Start: 2025-04-25

## 2025-04-25 RX ADMIN — METHOCARBAMOL 750 MG: 100 INJECTION, SOLUTION INTRAMUSCULAR; INTRAVENOUS at 05:04

## 2025-04-25 RX ADMIN — MUPIROCIN: 20 OINTMENT TOPICAL at 09:04

## 2025-04-25 RX ADMIN — OXYCODONE HYDROCHLORIDE 30 MG: 10 TABLET ORAL at 05:04

## 2025-04-25 RX ADMIN — HYDROMORPHONE HYDROCHLORIDE 2 MG: 2 INJECTION INTRAMUSCULAR; INTRAVENOUS; SUBCUTANEOUS at 06:04

## 2025-04-25 RX ADMIN — SOTALOL HYDROCHLORIDE 80 MG: 80 TABLET ORAL at 09:04

## 2025-04-25 RX ADMIN — ACETAMINOPHEN 1000 MG: 500 TABLET ORAL at 05:04

## 2025-04-25 RX ADMIN — MORPHINE SULFATE 60 MG: 30 TABLET, FILM COATED, EXTENDED RELEASE ORAL at 09:04

## 2025-04-25 RX ADMIN — CELECOXIB 200 MG: 200 CAPSULE ORAL at 09:04

## 2025-04-25 RX ADMIN — GABAPENTIN 300 MG: 300 CAPSULE ORAL at 09:04

## 2025-04-25 RX ADMIN — HYDROMORPHONE HYDROCHLORIDE 2 MG: 2 INJECTION INTRAMUSCULAR; INTRAVENOUS; SUBCUTANEOUS at 01:04

## 2025-04-25 NOTE — SUBJECTIVE & OBJECTIVE
Interval History:   SALLY AMEZQUITAVSS      Medications:  Continuous Infusions:  Scheduled Meds:   acetaminophen  1,000 mg Oral Q8H    amLODIPine  10 mg Oral QHS    celecoxib  200 mg Oral BID    enoxparin  40 mg Subcutaneous Q24H (prophylaxis, 1700)    gabapentin  300 mg Oral TID    morphine  60 mg Oral TID    mupirocin   Nasal BID    polyethylene glycol  17 g Oral Daily    senna-docusate  1 tablet Oral BID    sotaloL  80 mg Oral Daily     PRN Meds:  Current Facility-Administered Medications:     hydrALAZINE, 20 mg, Intravenous, Q6H PRN    HYDROmorphone, 2 mg, Intravenous, Q4H PRN    labetalol, 10 mg, Intravenous, Q6H PRN    levalbuterol, 0.63 mg, Nebulization, Q6H PRN    ondansetron, 8 mg, Intravenous, Q6H PRN    oxyCODONE, 30 mg, Oral, Q6H PRN    promethazine (PHENERGAN) 6.25 mg in 0.9% NaCl 50 mL IVPB, 6.25 mg, Intravenous, Q6H PRN    sodium chloride 0.9%, 10 mL, Intra-Catheter, PRN     Review of patient's allergies indicates:   Allergen Reactions    Cleocin [clindamycin hcl] Shortness Of Breath     Constipation    Amiodarone     Amiodarone analogues      Affected thyroid    Dabigatran etexilate     Rythmol [propafenone]      Weight loss/ nausea     Objective:     Vital Signs (Most Recent):  Temp: 98 °F (36.7 °C) (04/25/25 0400)  Pulse: 60 (04/25/25 0400)  Resp: 20 (04/25/25 0624)  BP: 111/75 (04/25/25 0400)  SpO2: 96 % (04/24/25 2000) Vital Signs (24h Range):  Temp:  [97.7 °F (36.5 °C)-98.1 °F (36.7 °C)] 98 °F (36.7 °C)  Pulse:  [60-71] 60  Resp:  [11-26] 20  SpO2:  [95 %-97 %] 96 %  BP: (111-137)/(75-94) 111/75     Weight: 81.6 kg (180 lb)  Body mass index is 24.41 kg/m².    Intake/Output - Last 3 Shifts         04/23 0700 04/24 0659 04/24 0700 04/25 0659 04/25 0700 04/26 0659    IV Piggyback       Total Intake(mL/kg)       Urine (mL/kg/hr) 1155 (0.6) 800 (0.4)     Total Output 1155 800     Net -1155 -800                     Physical Exam  Vitals reviewed.   Constitutional:       Appearance:  Normal appearance.   Cardiovascular:      Rate and Rhythm: Normal rate and regular rhythm.   Pulmonary:      Effort: Pulmonary effort is normal. No respiratory distress.   Abdominal:      Palpations: Abdomen is soft.      Comments: Appropriately tender, incisions healing well   Skin:     General: Skin is warm.   Neurological:      General: No focal deficit present.      Mental Status: He is alert and oriented to person, place, and time.        Significant Labs:  I have reviewed all pertinent lab results within the past 24 hours.  CBC:   Recent Labs   Lab 04/24/25  0600   WBC 13.75*   RBC 4.60   HGB 12.4*   HCT 39.5*      MCV 86   MCH 27.0   MCHC 31.4*     CMP:   Recent Labs   Lab 04/24/25  0600   CALCIUM 8.8   ALBUMIN 3.2*      K 3.8   CO2 24      BUN 19   CREATININE 0.7   ALKPHOS 93   ALT 5*   AST 15   BILITOT 0.5       Significant Diagnostics:  I have reviewed all pertinent imaging results/findings within the past 24 hours.

## 2025-04-25 NOTE — HPI
Mr. Benjamin is a 68 y/o male with what is essentially an incidental diagnosis of pancreatic tail adenocarcinoma. He has hx cholecystectomy 7 years ago but presented recently with abdominal pain and choledocholithiasis. This was treated by EUS/ERCP effectively, but on this w/u thorough EUS demonstrated an incidental pancreatic tail mass which was biopsied and consistent with adenocarcinoma. He has recovered well from his ERCP.  He has no family hx of pancreaticobiliary cancer.  He has chronic opioid use due to back pain for 20+ years with secondary constipation.  He is here for a robotic distal pancreatectomy and splenectomy on 4/22/2025.

## 2025-04-25 NOTE — ASSESSMENT & PLAN NOTE
70 yoM with pancreatic adenocarcinoma of the tail of the pancreas s/p robotic distal pancreatectomy and splenectomy on 4/22. On pathway.    - Ok for stepdown  - Continue regular diet  - Continue MMPC, including home narcotics  - Ambulate and up to the chair 3x per day  - PT/OT  - Morning labs    Dispo: likely discharge today pending labs

## 2025-04-25 NOTE — PROGRESS NOTES
[facility]  General Surgery  Progress Note    Subjective:     History of Present Illness:  No notes on file    Post-Op Info:  Procedure(s) (LRB):  XI ROBOTIC PANCREATECTOMY, DISTAL, robotic distal pancreatectomy,splenectomy (N/A)   3 Days Post-Op     Interval History: NAOE. Afebrile, HDS. Patient doing well this morning. Pain well controlled, has ROBF. Tolerating a diet without n/v. Ambulating. Labs pending this AM.    Medications:  Continuous Infusions:  Scheduled Meds:   acetaminophen  1,000 mg Oral Q8H    amLODIPine  10 mg Oral QHS    celecoxib  200 mg Oral BID    enoxparin  40 mg Subcutaneous Q24H (prophylaxis, 1700)    gabapentin  300 mg Oral TID    morphine  60 mg Oral TID    mupirocin   Nasal BID    polyethylene glycol  17 g Oral Daily    senna-docusate  1 tablet Oral BID    sotaloL  80 mg Oral Daily     PRN Meds:  Current Facility-Administered Medications:     hydrALAZINE, 20 mg, Intravenous, Q6H PRN    HYDROmorphone, 2 mg, Intravenous, Q4H PRN    labetalol, 10 mg, Intravenous, Q6H PRN    levalbuterol, 0.63 mg, Nebulization, Q6H PRN    ondansetron, 8 mg, Intravenous, Q6H PRN    oxyCODONE, 30 mg, Oral, Q6H PRN    promethazine (PHENERGAN) 6.25 mg in 0.9% NaCl 50 mL IVPB, 6.25 mg, Intravenous, Q6H PRN    sodium chloride 0.9%, 10 mL, Intra-Catheter, PRN     Review of patient's allergies indicates:   Allergen Reactions    Cleocin [clindamycin hcl] Shortness Of Breath     Constipation    Amiodarone     Amiodarone analogues      Affected thyroid    Dabigatran etexilate     Rythmol [propafenone]      Weight loss/ nausea     Objective:     Vital Signs (Most Recent):  Temp: 98 °F (36.7 °C) (04/25/25 0400)  Pulse: 60 (04/25/25 0400)  Resp: 20 (04/25/25 0624)  BP: 111/75 (04/25/25 0400)  SpO2: 96 % (04/24/25 2000) Vital Signs (24h Range):  Temp:  [97.7 °F (36.5 °C)-98.1 °F (36.7 °C)] 98 °F (36.7 °C)  Pulse:  [60-71] 60  Resp:  [11-26] 20  SpO2:  [95 %-97 %] 96 %  BP: (111-140)/(75-94) 111/75      Weight: 81.6 kg (180 lb)  Body mass index is 24.41 kg/m².    Intake/Output - Last 3 Shifts         04/23 0700  04/24 0659 04/24 0700  04/25 0659    Urine (mL/kg/hr) 1155 (0.6) 800 (0.4)    Total Output 1155 800    Net -1155 -800                   Physical Exam  Vitals reviewed.   Constitutional:       Appearance: Normal appearance.   Cardiovascular:      Rate and Rhythm: Normal rate and regular rhythm.   Pulmonary:      Effort: Pulmonary effort is normal. No respiratory distress.   Abdominal:      Palpations: Abdomen is soft.      Comments: Appropriately tender, incisions healing well   Skin:     General: Skin is warm.   Neurological:      General: No focal deficit present.      Mental Status: He is alert and oriented to person, place, and time.          Significant Labs:  I have reviewed all pertinent lab results within the past 24 hours.  CBC:   Recent Labs   Lab 04/24/25  0600   WBC 13.75*   RBC 4.60   HGB 12.4*   HCT 39.5*      MCV 86   MCH 27.0   MCHC 31.4*     CMP:   Recent Labs   Lab 04/24/25  0600   CALCIUM 8.8   ALBUMIN 3.2*      K 3.8   CO2 24      BUN 19   CREATININE 0.7   ALKPHOS 93   ALT 5*   AST 15   BILITOT 0.5       Significant Diagnostics:  I have reviewed all pertinent imaging results/findings within the past 24 hours.  Assessment/Plan:     * Pancreatic cancer  70 yoM with pancreatic adenocarcinoma of the tail of the pancreas s/p robotic distal pancreatectomy and splenectomy on 4/22. On pathway.    - Ok for stepdown  - Continue regular diet  - Continue MMPC, including home narcotics  - Ambulate and up to the chair 3x per day  - PT/OT  - Morning labs    Dispo: likely discharge today pending labs        Kelly Hanna MD  General Surgery  [facility]

## 2025-04-25 NOTE — PT/OT/SLP PROGRESS
Physical Therapy      Patient Name:  Fred Benjamin   MRN:  [id number]    Patient not seen today secondary to per RN pt anticipating d/c shortly. Will follow up at later date if pt still admitted.

## 2025-04-25 NOTE — ASSESSMENT & PLAN NOTE
Body mass index is 24.41 kg/m². Morbid obesity complicates all aspects of disease management from diagnostic modalities to treatment. Weight loss encouraged and health benefits explained to patient.

## 2025-04-25 NOTE — DISCHARGE SUMMARY
[facility]  General Surgery  Discharge Summary      Patient Name: Fred Benjamin  MRN: [id number]  Admission Date: 4/22/2025  Hospital Length of Stay: 3 days  Discharge Date and Time: 04/25/2025 10:05 AM  Attending Physician: Matthew Butler MD   Discharging Provider: Eva Bernal NP  Primary Care Provider: Edmund Ko DO    HPI:   Mr. Benjamin is a 68 y/o male with what is essentially an incidental diagnosis of pancreatic tail adenocarcinoma. He has hx cholecystectomy 7 years ago but presented recently with abdominal pain and choledocholithiasis. This was treated by EUS/ERCP effectively, but on this w/u thorough EUS demonstrated an incidental pancreatic tail mass which was biopsied and consistent with adenocarcinoma. He has recovered well from his ERCP.  He has no family hx of pancreaticobiliary cancer.  He has chronic opioid use due to back pain for 20+ years with secondary constipation.  He is here for a robotic distal pancreatectomy and splenectomy on 4/22/2025.    Procedure(s) (LRB):  XI ROBOTIC PANCREATECTOMY, DISTAL, robotic distal pancreatectomy,splenectomy (N/A)      Indwelling Lines/Drains at time of discharge:   Lines/Drains/Airways       Central Venous Catheter Line  Duration                  PowerPort A Cath Single Lumen 10/18/24 0739 Internal Jugular Right 189 days                  Hospital Course: Mr. Benjamin is a 71 y/o male with pancreatic adenocarcinoma of the tail of the pancreas s/p robotic distal pancreatectomy and splenectomy on 4/22/2025. He is on the pathway.   The patient is tolerating a regular diet.  He is voiding and ambulating without difficulty.  Patient with no complaints of nausea, vomiting, chest pain or shortness of breath.  His vital signs stable. He is afebrile. He is positive for flatus and positive for bowel sounds.  CV: RRR  Lungs: CTA bilaterally  Abdomen:  Soft, non-tender, non-distended, positive for bowel sounds, incision clean, dry  and intact.      Goals of Care Treatment Preferences:  Code Status: Full Code          What is most important right now is to focus on improvement in condition but with limits to invasive therapies.  Accordingly, we have decided that the best plan to meet the patient's goals includes continuing with treatment.      Consults: PT    Significant Diagnostic Studies: Labs: CMP   Recent Labs   Lab 04/24/25  0600 04/25/25  0546    136   K 3.8 3.7    104   CO2 24 25   BUN 19 27*   CREATININE 0.7 0.9   CALCIUM 8.8 8.5*   ALBUMIN 3.2* 2.9*   BILITOT 0.5 0.4   ALKPHOS 93 80   AST 15 13   ALT 5* 6*   ANIONGAP 9 7*    and CBC   Recent Labs   Lab 04/24/25  0600 04/25/25  0546   WBC 13.75* 10.66   HGB 12.4* 11.4*   HCT 39.5* 36.5*    241       Pending Diagnostic Studies:       None          Final Active Diagnoses:    Diagnosis Date Noted POA    PRINCIPAL PROBLEM:  Pancreatic cancer [C25.9] 10/11/2024 Yes    Opioid dependence [F11.20] 11/26/2024 Yes    Paroxysmal atrial fibrillation [I48.0] 04/04/2012 Yes    Obesity, Class I, BMI 30-34.9 [E66.811] 04/04/2012 Yes      Problems Resolved During this Admission:      Discharged Condition: good    Disposition: Home or Self Care    Follow Up:   Follow-up Information       Matthew Butler MD Follow up in 1 week(s).    Specialties: Surgical Oncology, General Surgery  Contact information:  [address] [place] [id number]  [phone]                           Patient Instructions:      Diet Adult Regular     Lifting restrictions   Order Comments: No lifting > 10 pounds.  May shower.     Notify your health care provider if you experience any of the following:  temperature >100.4     Notify your health care provider if you experience any of the following:  persistent nausea and vomiting or diarrhea     Notify your health care provider if you experience any of the following:  severe uncontrolled pain     Notify your health care provider if you experience any of  the following:  redness, tenderness, or signs of infection (pain, swelling, redness, odor or green/yellow discharge around incision site)     Notify your health care provider if you experience any of the following:  difficulty breathing or increased cough     Notify your health care provider if you experience any of the following:  severe persistent headache     Notify your health care provider if you experience any of the following:  worsening rash     Notify your health care provider if you experience any of the following:  persistent dizziness, light-headedness, or visual disturbances     Notify your health care provider if you experience any of the following:  increased confusion or weakness     No dressing needed     Activity as tolerated     Medications:  Reconciled Home Medications:      Medication List        START taking these medications      acetaminophen 500 MG tablet  Commonly known as: TYLENOL  Take 2 tablets (1,000 mg total) by mouth every 8 (eight) hours.     celecoxib 200 MG capsule  Commonly known as: CeleBREX  Take 1 capsule (200 mg total) by mouth 2 (two) times daily.     gabapentin 300 MG capsule  Commonly known as: NEURONTIN  Take 1 capsule (300 mg total) by mouth 3 (three) times daily. for 14 days     polyethylene glycol 17 gram Pwpk  Commonly known as: GLYCOLAX  Take 17 g by mouth once daily.     senna-docusate 8.6-50 mg per tablet  Commonly known as: PERICOLACE  Take 1 tablet by mouth 2 (two) times daily.            CONTINUE taking these medications      amLODIPine 10 MG tablet  Commonly known as: NORVASC  Take 10 mg by mouth every evening.     ammonium lactate 12 % Crea  Apply 1 Application topically As instructed (1-2 times a day to whole body for dry skin).     ELIQUIS 5 mg Tab  Generic drug: apixaban  Take 5 mg by mouth 2 (two) times daily.     morphine 60 MG 12 hr tablet  Commonly known as: MS CONTIN  Take 60 mg by mouth 3 (three) times daily.     naphazoline-pheniramine 0.025-0.3%  0.025-0.3 % ophthalmic solution  Commonly known as: NAPHCON-A  Place 1-2 drops into both eyes daily as needed (for allergies/dry eyes).     oxyCODONE 30 MG Tab  Commonly known as: ROXICODONE  Take 30 mg by mouth 4 (four) times daily as needed (for pain).     sotaloL 160 MG Tab  Commonly known as: BETAPACE  Take 1 tablet (160 mg total) by mouth once daily.            Time spent on the discharge of patient: 20 minutes    Eva Bernal NP  General Surgery  [facility]

## 2025-04-25 NOTE — HOSPITAL COURSE
Mr. Benjamin is a 71 y/o male with pancreatic adenocarcinoma of the tail of the pancreas s/p robotic distal pancreatectomy and splenectomy on 4/22/2025. He is on the pathway.   The patient is tolerating a regular diet.  He is voiding and ambulating without difficulty.  Patient with no complaints of nausea, vomiting, chest pain or shortness of breath.  His vital signs stable. He is afebrile. He is positive for flatus and positive for bowel sounds.  CV: RRR  Lungs: CTA bilaterally  Abdomen:  Soft, non-tender, non-distended, positive for bowel sounds, incision clean, dry and intact.

## 2025-04-25 NOTE — NURSING
Pt discharged home with family. IV's discontinued, vital signs stable & medication picked up from pharmacy.

## 2025-04-25 NOTE — PLAN OF CARE
[facility]  Discharge Final Note    Primary Care Provider: Edmund Ko DO    Expected Discharge Date: 4/25/2025    Final Discharge Note (most recent)       Final Note - 04/25/25 1254          Final Note    Assessment Type Final Discharge Note     Anticipated Discharge Disposition Home or Self Care     What phone number can be called within the next 1-3 days to see how you are doing after discharge? --   [phone]                    Important Message from Medicare  Important Message from Medicare regarding Discharge Appeal Rights: Other (comments) (Upon arriving to patient room to delivery IMM, patient had already discharged.)     Date IMM was signed: 04/25/25  Time IMM was signed: 1202    Contact Info       Matthew Butler MD   Specialty: Surgical Oncology, General Surgery    [address]  [facility] [address]   Phone: [phone]       Next Steps: Follow up in 1 week(s)          Patient discharged home to care of family on 4/25/25.    Gisela Wagner RNCM  Case Management  [facility]  [phone]

## 2025-04-25 NOTE — SUBJECTIVE & OBJECTIVE
Interval History: NAOE. Afebrile, HDS. Patient doing well this morning. Pain well controlled, has ROBF. Tolerating a diet without n/v. Ambulating. Labs pending this AM.    Medications:  Continuous Infusions:  Scheduled Meds:   acetaminophen  1,000 mg Oral Q8H    amLODIPine  10 mg Oral QHS    celecoxib  200 mg Oral BID    enoxparin  40 mg Subcutaneous Q24H (prophylaxis, 1700)    gabapentin  300 mg Oral TID    morphine  60 mg Oral TID    mupirocin   Nasal BID    polyethylene glycol  17 g Oral Daily    senna-docusate  1 tablet Oral BID    sotaloL  80 mg Oral Daily     PRN Meds:  Current Facility-Administered Medications:     hydrALAZINE, 20 mg, Intravenous, Q6H PRN    HYDROmorphone, 2 mg, Intravenous, Q4H PRN    labetalol, 10 mg, Intravenous, Q6H PRN    levalbuterol, 0.63 mg, Nebulization, Q6H PRN    ondansetron, 8 mg, Intravenous, Q6H PRN    oxyCODONE, 30 mg, Oral, Q6H PRN    promethazine (PHENERGAN) 6.25 mg in 0.9% NaCl 50 mL IVPB, 6.25 mg, Intravenous, Q6H PRN    sodium chloride 0.9%, 10 mL, Intra-Catheter, PRN     Review of patient's allergies indicates:   Allergen Reactions    Cleocin [clindamycin hcl] Shortness Of Breath     Constipation    Amiodarone     Amiodarone analogues      Affected thyroid    Dabigatran etexilate     Rythmol [propafenone]      Weight loss/ nausea     Objective:     Vital Signs (Most Recent):  Temp: 98 °F (36.7 °C) (04/25/25 0400)  Pulse: 60 (04/25/25 0400)  Resp: 20 (04/25/25 0624)  BP: 111/75 (04/25/25 0400)  SpO2: 96 % (04/24/25 2000) Vital Signs (24h Range):  Temp:  [97.7 °F (36.5 °C)-98.1 °F (36.7 °C)] 98 °F (36.7 °C)  Pulse:  [60-71] 60  Resp:  [11-26] 20  SpO2:  [95 %-97 %] 96 %  BP: (111-140)/(75-94) 111/75     Weight: 81.6 kg (180 lb)  Body mass index is 24.41 kg/m².    Intake/Output - Last 3 Shifts         04/23 0700  04/24 0659 04/24 0700  04/25 0659    Urine (mL/kg/hr) 1155 (0.6) 800 (0.4)    Total Output 1155 800    Net -1155 -800                   Physical Exam  Vitals  reviewed.   Constitutional:       Appearance: Normal appearance.   Cardiovascular:      Rate and Rhythm: Normal rate and regular rhythm.   Pulmonary:      Effort: Pulmonary effort is normal. No respiratory distress.   Abdominal:      Palpations: Abdomen is soft.      Comments: Appropriately tender, incisions healing well   Skin:     General: Skin is warm.   Neurological:      General: No focal deficit present.      Mental Status: He is alert and oriented to person, place, and time.          Significant Labs:  I have reviewed all pertinent lab results within the past 24 hours.  CBC:   Recent Labs   Lab 04/24/25  0600   WBC 13.75*   RBC 4.60   HGB 12.4*   HCT 39.5*      MCV 86   MCH 27.0   MCHC 31.4*     CMP:   Recent Labs   Lab 04/24/25  0600   CALCIUM 8.8   ALBUMIN 3.2*      K 3.8   CO2 24      BUN 19   CREATININE 0.7   ALKPHOS 93   ALT 5*   AST 15   BILITOT 0.5       Significant Diagnostics:  I have reviewed all pertinent imaging results/findings within the past 24 hours.

## 2025-04-28 NOTE — TELEPHONE ENCOUNTER
Called Mr. Benjamin today to follow up on a portal message sent to him recently regarding an [facility] research study he qualifies to participate in. Discussed study. He expressed that he is not interested in participating at this time.

## 2025-04-29 ENCOUNTER — TELEPHONE (OUTPATIENT)
Dept: SURGERY | Facility: CLINIC | Age: 70
End: 2025-04-29
Payer: MEDICARE

## 2025-04-29 ENCOUNTER — LAB VISIT (OUTPATIENT)
Dept: LAB | Facility: HOSPITAL | Age: 70
End: 2025-04-29
Attending: INTERNAL MEDICINE
Payer: MEDICARE

## 2025-04-29 DIAGNOSIS — C25.9 PANCREATIC ADENOCARCINOMA: ICD-10-CM

## 2025-04-29 DIAGNOSIS — C25.9 PANCREATIC ADENOCARCINOMA: Primary | ICD-10-CM

## 2025-04-29 LAB
BACTERIA #/AREA URNS HPF: ABNORMAL /HPF
BILIRUB UR QL STRIP.AUTO: NEGATIVE
CLARITY UR: CLEAR
COLOR UR AUTO: ABNORMAL
GLUCOSE UR QL STRIP: ABNORMAL
HGB UR QL STRIP: NEGATIVE
HYALINE CASTS #/AREA URNS LPF: 5 /LPF (ref 0–1)
KETONES UR QL STRIP: ABNORMAL
LEUKOCYTE ESTERASE UR QL STRIP: ABNORMAL
MICROSCOPIC COMMENT: ABNORMAL
NITRITE UR QL STRIP: POSITIVE
PH UR STRIP: 5 [PH]
PROT UR QL STRIP: ABNORMAL
RBC #/AREA URNS HPF: 0 /HPF (ref 0–4)
SP GR UR STRIP: 1.02
SQUAMOUS #/AREA URNS HPF: 2 /HPF
WBC #/AREA URNS HPF: 1 /HPF (ref 0–5)

## 2025-04-29 PROCEDURE — 81003 URINALYSIS AUTO W/O SCOPE: CPT | Mod: PN

## 2025-04-29 NOTE — TELEPHONE ENCOUNTER
Adelita DAVIS reviewed UA results and medication not needed.     Call placed to pt to notify him UA results reviewed. No answer. Unable to leave message. Voicemail full.

## 2025-04-29 NOTE — TELEPHONE ENCOUNTER
Received incoming call from pt. Pt reports he is doing ok. He reports he is having bladder pain on right side and pain is deep. Denies burning with urination, fever, and hematuria. Pt reports he started taking OTC hydrochloride 99.5 mg and he feels better. Instructed pt to report to an [facility] lab to submit urine specimen. Pt states he will go to the lab shortly.

## 2025-04-30 ENCOUNTER — TELEPHONE (OUTPATIENT)
Dept: SURGERY | Facility: CLINIC | Age: 70
End: 2025-04-30
Payer: MEDICARE

## 2025-04-30 NOTE — TELEPHONE ENCOUNTER
"Returned call. Spoke to pt. Pt inquiring about results of UA. Informed him this RN reached out via phone and portal message. Results reviewed by Adelita DAVIS and antibiotic is not needed. Pt reports his pain is 8/10 and describes pain as persistent right bladder/incisional burning pain. He is taking oxycodone 30mg and MS contin 60mg and still experiencing pain. Pt denies fever, nausea, vomiting, diarrhea, redness/drainage to surgical site. Last BM was this morning. Denies burning with urination and hematuria. Offered clinic appt. Pt declined stating he will "just deal with the pain and call the office if it gets worse". Dr. Butler updated on status.        "

## 2025-04-30 NOTE — TELEPHONE ENCOUNTER
----- Message from [employer] sent at 4/30/2025  9:00 AM CDT -----  Regarding: Advice  Contact: [phone]  Who call ? Fred Benjamin What is the request Details : Pt calling to speak with Karis  regarding blood work.  Please call pt back.   Can clinic  use patient portal  : No What number to call back : [phone]

## 2025-04-30 NOTE — TELEPHONE ENCOUNTER
Call placed to pt regarding request to cancel appt on 5/7. No answer. Unable to leave message. Pt's mailbox is full.

## 2025-05-01 NOTE — TELEPHONE ENCOUNTER
Call placed to pt regarding portal message. Pt states he does not want to cancel his appt on 5/7 and it was an accident that he requested to cancel via his [facility]. All appt details reviewed. Pt verbalized understanding.

## 2025-05-02 LAB
DHEA SERPL-MCNC: NORMAL
ESTROGEN SERPL-MCNC: NORMAL PG/ML
INSULIN SERPL-ACNC: NORMAL U[IU]/ML
LAB AP CLINICAL INFORMATION: NORMAL
LAB AP GROSS DESCRIPTION: NORMAL
LAB AP PERFORMING LOCATION(S): NORMAL
LAB AP REPORT FOOTNOTES: NORMAL
T3RU NFR SERPL: NORMAL %

## 2025-05-05 NOTE — PROGRESS NOTES
"Mr. Benjamin is s/p robotic distal pancreatectomy and splenectomy 4/22/2025, discharged POD#3 without apparent complication.      Final Diagnosis   1. Lymph node, "gastric node," excision:  - 1 lymph node, negative for metastatic carcinoma (0/1)     2. Distal pancreas and spleen, distal pancreatectomy and splenectomy:  - High-grade pancreatic intraepithelial neoplasia  - No evidence of residual invasive carcinoma (Entirety of pancreatic parenchyma submitted)  - Chronic pancreatitis and extensive lobular atrophy   - Resection margin negative for carcinoma or high-grade pancreatic intraepithelial neoplasia  - 17 lymph nodes, negative for metastatic carcinoma (0/17)  - Spleen with no significant histopathologic alteration      A/P:  ypT0N0, excellent result from systemic therapy. He sees Dr. Suero to discuss completion of adjuvant systemic therapy. From my perspective he is cleared to start any necessary therapy when Dr. Suero sees fit. We did discuss the general recommendation to complete a full course of systemic therapy even in the setting of pCR, although there is no randomized data to clearly direct that care. In any case, I am available should he have any issues but he looks well recovered.      Matthew Butler MD  Upper GI / Hepatobiliary Surgical Oncology  [facility] [place], [place]  Office: [phone]  Fax: [phone]       "

## 2025-05-07 ENCOUNTER — OFFICE VISIT (OUTPATIENT)
Dept: HEMATOLOGY/ONCOLOGY | Facility: CLINIC | Age: 70
End: 2025-05-07
Payer: MEDICARE

## 2025-05-07 VITALS
WEIGHT: 183.88 LBS | BODY MASS INDEX: 24.94 KG/M2 | HEART RATE: 98 BPM | OXYGEN SATURATION: 94 % | SYSTOLIC BLOOD PRESSURE: 118 MMHG | DIASTOLIC BLOOD PRESSURE: 88 MMHG

## 2025-05-07 DIAGNOSIS — C25.9 MALIGNANT NEOPLASM OF PANCREAS, UNSPECIFIED LOCATION OF MALIGNANCY: Primary | ICD-10-CM

## 2025-05-07 PROCEDURE — 99213 OFFICE O/P EST LOW 20 MIN: CPT | Mod: PBBFAC,PN | Performed by: SURGERY

## 2025-05-07 PROCEDURE — 99024 POSTOP FOLLOW-UP VISIT: CPT | Mod: POP,,, | Performed by: SURGERY

## 2025-05-07 PROCEDURE — 99999 PR PBB SHADOW E&M-EST. PATIENT-LVL III: CPT | Mod: PBBFAC,,, | Performed by: SURGERY

## 2025-05-07 RX ORDER — AMOXICILLIN AND CLAVULANATE POTASSIUM 875; 125 MG/1; MG/1
1 TABLET, FILM COATED ORAL EVERY 12 HOURS
COMMUNITY

## 2025-05-09 ENCOUNTER — TELEPHONE (OUTPATIENT)
Dept: HEMATOLOGY/ONCOLOGY | Facility: CLINIC | Age: 70
End: 2025-05-09
Payer: MEDICARE

## 2025-05-09 NOTE — TELEPHONE ENCOUNTER
----- Message from ANALY Dyson sent at 5/9/2025 12:10 PM CDT -----  Please schedule pt for follow up with Dr. Suero to discuss completion of adjuvant  systemic therapy. He is s/p distal panc and is cleared by Dr. Butler for any necessary therapyWoo, KAYLEEN, RN

## 2025-05-09 NOTE — TELEPHONE ENCOUNTER
Spoke to patient, he would like to wait a month before seeing Dr Suero. Schedule for 6/2 at 11:40am per his request.

## 2025-05-12 NOTE — TELEPHONE ENCOUNTER
----- Message from Elayne sent at 11/26/2024 12:08 PM CST -----  Type: Needs Medical Advice  Who Called: Fina (wife)  Best Call Back Number: [phone]  Additional Information: pt wife is requesting a call back  to cancel 11/26 appt and states he is currently in the ER. Also wants to discuss his treatments   5

## 2025-05-19 ENCOUNTER — TELEPHONE (OUTPATIENT)
Dept: SURGERY | Facility: CLINIC | Age: 70
End: 2025-05-19
Payer: MEDICARE

## 2025-05-19 ENCOUNTER — LAB VISIT (OUTPATIENT)
Dept: LAB | Facility: HOSPITAL | Age: 70
End: 2025-05-19
Attending: SURGERY
Payer: MEDICARE

## 2025-05-19 DIAGNOSIS — C25.9 PANCREATIC ADENOCARCINOMA: Primary | ICD-10-CM

## 2025-05-19 DIAGNOSIS — C25.9 PANCREATIC ADENOCARCINOMA: ICD-10-CM

## 2025-05-19 DIAGNOSIS — R50.9 FEVER, UNSPECIFIED FEVER CAUSE: ICD-10-CM

## 2025-05-19 LAB
ABSOLUTE EOSINOPHIL (OHS): 0.2 K/UL
ABSOLUTE MONOCYTE (OHS): 1.26 K/UL (ref 0.3–1)
ABSOLUTE NEUTROPHIL COUNT (OHS): 11.79 K/UL (ref 1.8–7.7)
ALBUMIN SERPL BCP-MCNC: 3 G/DL (ref 3.5–5.2)
ALP SERPL-CCNC: 88 UNIT/L (ref 40–150)
ALT SERPL W/O P-5'-P-CCNC: 7 UNIT/L (ref 10–44)
ANION GAP (OHS): 9 MMOL/L (ref 8–16)
AST SERPL-CCNC: 11 UNIT/L (ref 11–45)
BASOPHILS # BLD AUTO: 0.06 K/UL
BASOPHILS NFR BLD AUTO: 0.4 %
BILIRUB SERPL-MCNC: 0.4 MG/DL (ref 0.1–1)
BUN SERPL-MCNC: 18 MG/DL (ref 8–23)
CALCIUM SERPL-MCNC: 8.6 MG/DL (ref 8.7–10.5)
CHLORIDE SERPL-SCNC: 103 MMOL/L (ref 95–110)
CO2 SERPL-SCNC: 25 MMOL/L (ref 23–29)
CREAT SERPL-MCNC: 1.2 MG/DL (ref 0.5–1.4)
ERYTHROCYTE [DISTWIDTH] IN BLOOD BY AUTOMATED COUNT: 14 % (ref 11.5–14.5)
GFR SERPLBLD CREATININE-BSD FMLA CKD-EPI: >60 ML/MIN/1.73/M2
GLUCOSE SERPL-MCNC: 168 MG/DL (ref 70–110)
HCT VFR BLD AUTO: 34.7 % (ref 40–54)
HGB BLD-MCNC: 11.1 GM/DL (ref 14–18)
IMM GRANULOCYTES # BLD AUTO: 0.08 K/UL (ref 0–0.04)
IMM GRANULOCYTES NFR BLD AUTO: 0.5 % (ref 0–0.5)
LYMPHOCYTES # BLD AUTO: 1.7 K/UL (ref 1–4.8)
MCH RBC QN AUTO: 27.3 PG (ref 27–31)
MCHC RBC AUTO-ENTMCNC: 32 G/DL (ref 32–36)
MCV RBC AUTO: 85 FL (ref 82–98)
NUCLEATED RBC (/100WBC) (OHS): 0 /100 WBC
PLATELET # BLD AUTO: 274 K/UL (ref 150–450)
PMV BLD AUTO: 9.6 FL (ref 9.2–12.9)
POTASSIUM SERPL-SCNC: 4.1 MMOL/L (ref 3.5–5.1)
PROT SERPL-MCNC: 7 GM/DL (ref 6–8.4)
RBC # BLD AUTO: 4.07 M/UL (ref 4.6–6.2)
RELATIVE EOSINOPHIL (OHS): 1.3 %
RELATIVE LYMPHOCYTE (OHS): 11.3 % (ref 18–48)
RELATIVE MONOCYTE (OHS): 8.3 % (ref 4–15)
RELATIVE NEUTROPHIL (OHS): 78.2 % (ref 38–73)
SODIUM SERPL-SCNC: 137 MMOL/L (ref 136–145)
WBC # BLD AUTO: 15.09 K/UL (ref 3.9–12.7)

## 2025-05-19 PROCEDURE — 85025 COMPLETE CBC W/AUTO DIFF WBC: CPT | Mod: PN

## 2025-05-19 PROCEDURE — 82040 ASSAY OF SERUM ALBUMIN: CPT | Mod: PN

## 2025-05-19 PROCEDURE — 36415 COLL VENOUS BLD VENIPUNCTURE: CPT | Mod: PN

## 2025-05-19 NOTE — TELEPHONE ENCOUNTER
"Returned call. Spoke to pt. Pt reports he has been running temp of 100 since Friday. Highest temp was 100.5. Pt has been taking tylenol. Denies vomiting and diarrhea. Endorses nausea. Denies redness and drainage to surgical site. Pt reports "area is sore by his bladder area" and he has increased tenderness to bottom of surgical site.   Pt is worried he has an infection. He reports he spoke to an on call Provider on Friday whom instructed him to contact the office if he continued to run fever.  Pt scheduled for labs today. Details provided. Pt verbalized understanding. Dr. Butler updated via secure chat.  "

## 2025-05-19 NOTE — TELEPHONE ENCOUNTER
----- Message from Nurse Mejia sent at 5/19/2025  3:31 PM CDT -----  Good afternoon, pt is scheduled next week for Bexsero 2nd dose. I am unable to see an order. Thanks. Order number [id number].

## 2025-05-19 NOTE — PROGRESS NOTES
Received secure chat from Dr. Butler with orders for chest xray and to schedule pt for clinic on 5/20

## 2025-05-19 NOTE — TELEPHONE ENCOUNTER
Call placed to pt. Informed labs reviewed per Dr. Butler and pt needs to be scheduled for chest xray and clinic appt. Offered multiple times and location chest xray. Chest xray scheduled per pt preferred time and location. Offered am or pm clinic appt tomorrow. Clinic scheduled per pt preferred time. Details provided. Pt verbalized understanding.

## 2025-05-19 NOTE — TELEPHONE ENCOUNTER
----- Message from [employer] sent at 5/19/2025  9:38 AM CDT -----  Regarding: Call from ONLY Dr. Carrie Davis,Who called: The pt Reason: Hi, pt called to request a call ONLY from Dr. Butler. Pt says he has been running a light fever for three days, pain in right lower abdomin. And nauseous on Friday. Pls call the pt at  [phone]'s name: Dr. Butler Additional Information: Thank you.

## 2025-05-20 ENCOUNTER — OFFICE VISIT (OUTPATIENT)
Dept: SURGERY | Facility: CLINIC | Age: 70
End: 2025-05-20
Payer: MEDICARE

## 2025-05-20 ENCOUNTER — HOSPITAL ENCOUNTER (OUTPATIENT)
Dept: RADIOLOGY | Facility: HOSPITAL | Age: 70
Discharge: HOME | End: 2025-05-20
Attending: SURGERY
Payer: MEDICARE

## 2025-05-20 VITALS
TEMPERATURE: 98 F | WEIGHT: 183.44 LBS | BODY MASS INDEX: 24.88 KG/M2 | SYSTOLIC BLOOD PRESSURE: 116 MMHG | HEART RATE: 69 BPM | OXYGEN SATURATION: 99 % | DIASTOLIC BLOOD PRESSURE: 64 MMHG

## 2025-05-20 DIAGNOSIS — R50.9 FEVER, UNSPECIFIED FEVER CAUSE: ICD-10-CM

## 2025-05-20 DIAGNOSIS — Z85.07 HISTORY OF MALIGNANT NEOPLASM OF PANCREAS: ICD-10-CM

## 2025-05-20 DIAGNOSIS — E88.09 HYPOALBUMINEMIA: ICD-10-CM

## 2025-05-20 DIAGNOSIS — Z85.51 HISTORY OF BLADDER CANCER: ICD-10-CM

## 2025-05-20 DIAGNOSIS — C25.9 PANCREATIC ADENOCARCINOMA: ICD-10-CM

## 2025-05-20 DIAGNOSIS — R50.9 FEVER, UNSPECIFIED FEVER CAUSE: Primary | ICD-10-CM

## 2025-05-20 PROBLEM — J96.01 ACUTE HYPOXIC RESPIRATORY FAILURE: Status: RESOLVED | Noted: 2024-11-26 | Resolved: 2025-05-20

## 2025-05-20 PROBLEM — D84.821 IMMUNOSUPPRESSION DUE TO DRUG THERAPY: Status: RESOLVED | Noted: 2024-11-06 | Resolved: 2025-05-20

## 2025-05-20 PROBLEM — R11.10 ABDOMINAL PAIN, VOMITING, AND DIARRHEA: Status: RESOLVED | Noted: 2024-12-17 | Resolved: 2025-05-20

## 2025-05-20 PROBLEM — E87.29 METABOLIC ACIDOSIS, INCREASED ANION GAP: Status: RESOLVED | Noted: 2024-12-17 | Resolved: 2025-05-20

## 2025-05-20 PROBLEM — J69.0 ASPIRATION PNEUMONIA OF LEFT LOWER LOBE: Status: RESOLVED | Noted: 2024-12-20 | Resolved: 2025-05-20

## 2025-05-20 PROBLEM — E87.6 HYPOKALEMIA: Status: RESOLVED | Noted: 2024-11-05 | Resolved: 2025-05-20

## 2025-05-20 PROBLEM — E87.1 HYPONATREMIA: Status: RESOLVED | Noted: 2024-12-17 | Resolved: 2025-05-20

## 2025-05-20 PROBLEM — T45.1X5A CHEMOTHERAPY ADVERSE REACTION: Status: RESOLVED | Noted: 2025-01-17 | Resolved: 2025-05-20

## 2025-05-20 PROBLEM — R65.10 SIRS (SYSTEMIC INFLAMMATORY RESPONSE SYNDROME): Status: RESOLVED | Noted: 2024-12-17 | Resolved: 2025-05-20

## 2025-05-20 PROBLEM — R10.9 ABDOMINAL PAIN, VOMITING, AND DIARRHEA: Status: RESOLVED | Noted: 2024-12-17 | Resolved: 2025-05-20

## 2025-05-20 PROBLEM — E83.42 HYPOMAGNESEMIA: Status: RESOLVED | Noted: 2025-02-16 | Resolved: 2025-05-20

## 2025-05-20 PROBLEM — N17.9 AKI (ACUTE KIDNEY INJURY): Status: RESOLVED | Noted: 2024-11-05 | Resolved: 2025-05-20

## 2025-05-20 PROBLEM — R11.2 NAUSEA & VOMITING: Status: RESOLVED | Noted: 2024-11-06 | Resolved: 2025-05-20

## 2025-05-20 PROBLEM — N17.9 ACUTE RENAL FAILURE: Status: RESOLVED | Noted: 2025-01-17 | Resolved: 2025-05-20

## 2025-05-20 PROBLEM — E87.5 HYPERKALEMIA: Status: RESOLVED | Noted: 2024-11-26 | Resolved: 2025-05-20

## 2025-05-20 PROBLEM — Z79.899 IMMUNOSUPPRESSION DUE TO DRUG THERAPY: Status: RESOLVED | Noted: 2024-11-06 | Resolved: 2025-05-20

## 2025-05-20 PROBLEM — R41.82 AMS (ALTERED MENTAL STATUS): Status: RESOLVED | Noted: 2024-11-26 | Resolved: 2025-05-20

## 2025-05-20 PROBLEM — I95.9 HYPOTENSION: Status: RESOLVED | Noted: 2024-11-06 | Resolved: 2025-05-20

## 2025-05-20 PROBLEM — R19.7 ABDOMINAL PAIN, VOMITING, AND DIARRHEA: Status: RESOLVED | Noted: 2024-12-17 | Resolved: 2025-05-20

## 2025-05-20 PROCEDURE — 99213 OFFICE O/P EST LOW 20 MIN: CPT | Mod: PBBFAC,25 | Performed by: NURSE PRACTITIONER

## 2025-05-20 PROCEDURE — 99024 POSTOP FOLLOW-UP VISIT: CPT | Mod: POP,,, | Performed by: NURSE PRACTITIONER

## 2025-05-20 PROCEDURE — 71046 X-RAY EXAM CHEST 2 VIEWS: CPT | Mod: TC,FY,PO

## 2025-05-20 PROCEDURE — 99999 PR PBB SHADOW E&M-EST. PATIENT-LVL III: CPT | Mod: PBBFAC,,, | Performed by: NURSE PRACTITIONER

## 2025-05-20 PROCEDURE — 71046 X-RAY EXAM CHEST 2 VIEWS: CPT | Mod: 26,,, | Performed by: RADIOLOGY

## 2025-05-20 NOTE — PROGRESS NOTES
"Post-Op Follow-up Visit:   5/20/2025  Patient ID: Fred Benjamin is a 70 y.o. male, born 1955    Chief Complaint   Patient presents with    Post-op Evaluation     2017: bladder cancer  9/2024: incidental finding of tail of panc CA, CA 19-9 WNL  2/14/2025: admitted overnight with [place]/ dehydration  2/18-4/1/2025: completed 4 cycles of neoadj FFX per Dr. Suero  4/22/2025: s/p robotic distal pancreatectomy/ splenectomy per Dr. Matthew Butler, path = CR     Interval History: This 69 y/o gentleman presents to clinic from [place], accompanied by his wife.   He was last seen in [facility] post op clinic by Dr. Matthew Butler on 5/7/2025, with RTC prn. He called reporting low grade fever (Tmax 100.5) since 5/15/25. Labs obtained yesterday noting elevated WBC; CXR today negative for acute process. No fever today in clinic.   C/o constant "lower abdomen pressure over my bladder". Feels better when he rests so admits he has been less active over past week. Denies dysuria or discharge. Urine sample was collected last month. He is NOT taking any antibiotics.   Appetite is decent, tolerating oral diet without N/V/D. + BM QOD brown, formed, sinks in toilet.   He is scheduled for post op spleen vaccine on 5/30/25, then f/u with Dr. Suero on 6/5/25.     5/20/2025: CXR:  FINDINGS:  Stable positioning of a right chest wall port catheter and left chest wall dual lead pacemaker.     Left basilar atelectasis.  No focal consolidation, pleural effusion or pneumothorax.     Unchanged mild cardiomegaly.     No acute or aggressive osseous abnormality.     Impression:     No acute cardiopulmonary abnormality.      Chemistry        Component Value Date/Time     05/19/2025 1201     03/21/2025 0915    K 4.1 05/19/2025 1201    K 4.7 03/21/2025 0915     05/19/2025 1201     03/21/2025 0915    CO2 25 05/19/2025 1201    CO2 22 (L) 03/21/2025 0915    BUN 18 05/19/2025 1201    CREATININE 1.2 05/19/2025 1201     (H) " "05/19/2025 1201    GLU 91 03/21/2025 0915        Component Value Date/Time    CALCIUM 8.6 (L) 05/19/2025 1201    CALCIUM 8.1 (L) 03/21/2025 0915    ALKPHOS 88 05/19/2025 1201    ALKPHOS 102 03/21/2025 0915    AST 11 05/19/2025 1201    AST 16 03/21/2025 0915    ALT 7 (L) 05/19/2025 1201    ALT 10 03/21/2025 0915    BILITOT 0.4 05/19/2025 1201    BILITOT 0.3 03/21/2025 0915    ESTGFRAFRICA >60 07/10/2018 1130    EGFRNONAA >60 07/10/2018 1130        Lab Results   Component Value Date    ALBUMIN 3.0 (L) 05/19/2025     Lab Results   Component Value Date    WBC 15.09 (H) 05/19/2025    HGB 11.1 (L) 05/19/2025    HCT 34.7 (L) 05/19/2025    MCV 85 05/19/2025     05/19/2025     Physical Exam:  /64 (BP Location: Left arm, Patient Position: Sitting)   Pulse 69   Temp 98.3 °F (36.8 °C)   Wt 83.2 kg (183 lb 6.8 oz)   SpO2 99%   BMI 24.88 kg/m²     General:  Non-toxic, ambulatory  Abd:  Soft, non-tender  Incision:  several abd trocar sites and midline transverse abd scar healed without erythema or exudate.     Pathology:  1. Lymph node, "gastric node," excision:  - 1 lymph node, negative for metastatic carcinoma (0/1)     2. Distal pancreas and spleen, distal pancreatectomy and splenectomy:  - High-grade pancreatic intraepithelial neoplasia  - No evidence of residual invasive carcinoma (Entirety of pancreatic parenchyma submitted)  - Chronic pancreatitis and extensive lobular atrophy   - Resection margin negative for carcinoma or high-grade pancreatic intraepithelial neoplasia  - 17 lymph nodes, negative for metastatic carcinoma (0/17)  - Spleen with no significant histopathologic alteration       ICD-10-CM ICD-9-CM    1. Fever, unspecified fever cause  R50.9 780.60 URINALYSIS      2. History of malignant neoplasm of pancreas  Z85.07 V10.09       3. History of bladder cancer  Z85.51 V10.51       4. Hypoalbuminemia  E88.09 273.8       Plan   Reviewed pathology briefly, path CR with neg margins and nodes. Keep " upcoming appt next month with local med onc.   Reviewed CXR and labs. Obtain UA. Instructed to f/u with outside urology given hx of bladder cancer.   Continue oral diet with focus on protein intake and adequate hydration  Increase physical activity but no heavy lifting until >6 weeks post op   Obtain post op spleen vaccines as scheduled for 5/30    RTC prn    Questions were asked and answered to patient's satisfaction.          Beatriz Meyer NP  Upper GI / Hepatobiliary Surgical Oncology  [facility] [place], [place]  Office: [phone]  Fax: [phone]     5/21/2025 ADDENDUM:  Reviewed UA results.

## 2025-05-20 NOTE — TELEPHONE ENCOUNTER
Call placed to pt. Spoke to pt spouse. Fina reports pt turned in urine specimen on 3rd floor lab and placed in blue box per Catie (reception) instructions. Will follow up with lab to ensure specimen is being processed.    Met with Anuja on 3rd floor lab in [place]. She was uncertain if she could link orders to the lab appt she scheduled. Orders were linked per Anuja and she reports pt turned in urine collection.

## 2025-06-12 ENCOUNTER — TELEPHONE (OUTPATIENT)
Dept: HEMATOLOGY/ONCOLOGY | Facility: CLINIC | Age: 70
End: 2025-06-12
Payer: MEDICARE

## 2025-06-12 NOTE — TELEPHONE ENCOUNTER
Copied from CRM #[id number]. Topic: General Inquiry - Patient Advice  >> Jun 12, 2025 11:31 AM Candace wrote:  Type:  Needs Medical Advice    Who Called: pt  Symptoms (please be specific):    How long has patient had these symptoms:    Pharmacy name and phone #:    Would the patient rather a call back or a response via [facility]? call  Best Call Back Number: [phone]    Additional Information: pt is needing to r/s an appt

## 2025-06-20 ENCOUNTER — CLINICAL SUPPORT (OUTPATIENT)
Dept: INFECTIOUS DISEASES | Facility: CLINIC | Age: 70
End: 2025-06-20
Payer: MEDICARE

## 2025-06-20 DIAGNOSIS — Z00.00 HEALTHCARE MAINTENANCE: Primary | ICD-10-CM

## 2025-06-20 DIAGNOSIS — C25.9 PANCREATIC ADENOCARCINOMA: Primary | ICD-10-CM

## 2025-06-20 RX ADMIN — MENINGOCOCCAL (GROUPS A, C, Y AND W-135) OLIGOSACCHARIDE DIPHTHERIA CRM197 CONJUGATE VACCINE 0.5 ML: 10; 5; 5; 5 INJECTION, SOLUTION INTRAMUSCULAR at 11:06

## 2025-06-20 NOTE — PROGRESS NOTES
Pt received two vaccines IM to right deltoid, Menveo to superior site and Bexsero to inferior site. Pt tolerated injections well and departed from clinic in [facility].

## 2025-07-02 ENCOUNTER — TELEPHONE (OUTPATIENT)
Dept: HEMATOLOGY/ONCOLOGY | Facility: CLINIC | Age: 70
End: 2025-07-02
Payer: MEDICARE

## 2025-07-02 PROBLEM — C25.9 PANCREATIC ADENOCARCINOMA: Status: ACTIVE | Noted: 2025-07-02

## 2025-07-02 PROBLEM — G43.909 MIGRAINE WITHOUT STATUS MIGRAINOSUS: Status: ACTIVE | Noted: 2025-07-02

## 2025-07-02 PROBLEM — I49.5 SICK SINUS SYNDROME: Status: ACTIVE | Noted: 2025-07-02

## 2025-07-02 PROBLEM — G89.3 CANCER ASSOCIATED PAIN: Status: ACTIVE | Noted: 2025-07-02

## 2025-07-02 NOTE — TELEPHONE ENCOUNTER
Copied from CRM #[id number]. Topic: General Inquiry - Patient Advice  >> Jul 2, 2025  9:38 AM Carmen wrote:  Type: Needs Medical Advice  Who Called:  Fina   Symptoms (please be specific):    How long has patient had these symptoms:    Pharmacy name and phone #:    Best Call Back Number: [phone] or   Additional Information: [facility]

## 2025-07-14 ENCOUNTER — TELEPHONE (OUTPATIENT)
Dept: HEMATOLOGY/ONCOLOGY | Facility: CLINIC | Age: 70
End: 2025-07-14
Payer: MEDICARE

## 2025-07-14 NOTE — TELEPHONE ENCOUNTER
----- Message from ANALY Dyson sent at 7/11/2025  4:54 PM CDT -----  Ryan Faith    Following up to see if you can reach out to Mr. Benjamin to reschedule his appt. Looks like he was cancelled secondary to a hospitalization.    Thanks  Karis

## 2025-07-15 ENCOUNTER — TELEPHONE (OUTPATIENT)
Dept: HEMATOLOGY/ONCOLOGY | Facility: CLINIC | Age: 70
End: 2025-07-15
Payer: MEDICARE

## 2025-07-29 ENCOUNTER — OUTPATIENT CASE MANAGEMENT (OUTPATIENT)
Dept: ADMINISTRATIVE | Facility: OTHER | Age: 70
End: 2025-07-29
Payer: MEDICARE

## 2025-07-29 NOTE — LETTER
Fred Benjamin  [address] [place] [address] [address] [id number]      Dear Fred Benjamin,     I work with [facility] Outpatient Care Management Department. We received a referral to call you to discuss your medical history. These services are free of charge and are offered to [facility] patients who have recently been discharged from any of our facilities or who have medical conditions that may require the skill of a nurse to assist with management.     I am a Registered Nurse who specializes in connecting patients with available medical and financial resources as well as addressing any educational needs that may be indicated.    I attempted to reach you by telephone, but I was unsuccessful. Please call our department so that we can go over some questions with you, regarding your health.    The Outpatient Care Management Department can be reached at [phone], from 8:00AM to 4:30 PM, on Monday thru Friday.     Additionally, [facility] also has a program where a nurse is available 24/7 to answer questions or provide medical advice, their number is [phone].      Thanks,        Fany Mullins RN  Outpatient Care Management  Phone #: [phone]

## 2025-07-29 NOTE — PROGRESS NOTES
7/29/2025  1st attempt to complete Initial Assessment  for Outpatient Care Management, left message with wife.  Will send via Handseeing Information -  unable to assess letter.

## 2025-08-05 ENCOUNTER — OUTPATIENT CASE MANAGEMENT (OUTPATIENT)
Dept: ADMINISTRATIVE | Facility: OTHER | Age: 70
End: 2025-08-05
Payer: MEDICARE

## 2025-08-19 ENCOUNTER — OUTPATIENT CASE MANAGEMENT (OUTPATIENT)
Dept: ADMINISTRATIVE | Facility: OTHER | Age: 70
End: 2025-08-19
Payer: MEDICARE

## 2025-08-26 ENCOUNTER — OUTPATIENT CASE MANAGEMENT (OUTPATIENT)
Dept: ADMINISTRATIVE | Facility: OTHER | Age: 70
End: 2025-08-26
Payer: MEDICARE

## 2025-09-03 ENCOUNTER — PATIENT OUTREACH (OUTPATIENT)
Dept: ADMINISTRATIVE | Facility: HOSPITAL | Age: 70
End: 2025-09-03
Payer: MEDICARE

## (undated) DEVICE — TROCAR ENDOPATH XCEL 5MM 7.5CM

## (undated) DEVICE — SHELL POWER SIGNIA

## (undated) DEVICE — NDL HYPO REG 25G X 1 1/2

## (undated) DEVICE — RELOAD TRI-STAPLE XTHICK 60MM

## (undated) DEVICE — SYR 10CC LUER LOCK

## (undated) DEVICE — DRAPE ABDOMINAL TIBURON 14X11

## (undated) DEVICE — TRAY MINOR GEN SURG OMC

## (undated) DEVICE — GELPOINT MINI KIT ENDOSCOPIC

## (undated) DEVICE — Device

## (undated) DEVICE — ELECTRODE MEGADYNE RETURN DUAL

## (undated) DEVICE — SUT 2-0 12-18IN SILK

## (undated) DEVICE — COVER PROXIMA MAYO STAND

## (undated) DEVICE — DRAPE SCOPE PILLOW WARMER

## (undated) DEVICE — DRAPE COLUMN DAVINCI XI

## (undated) DEVICE — SUT VICRYL+ 27 UR-6 VIOL

## (undated) DEVICE — BELLOW CANN HEMOBLAST 1.65GR

## (undated) DEVICE — IRRIGATOR ENDOWRIST XI SUCTION

## (undated) DEVICE — APPLICATOR HEMOBLAST LAPSCP

## (undated) DEVICE — SOL ELECTROLUBE ANTI-STIC

## (undated) DEVICE — DRAPE ARM DAVINCI XI

## (undated) DEVICE — TAPE UMBILICAL COTTON 1/8X1

## (undated) DEVICE — SUT ABS CLIP LAPRA-TY CTD

## (undated) DEVICE — COVER TIP CURVED SCISSORS XI

## (undated) DEVICE — SUT PROLENE 4-0 RB-1 BL MO

## (undated) DEVICE — PENCIL ROCKER SWITCH 10FT CORD

## (undated) DEVICE — TUBE SET SINGLE LUMEN FILTERED

## (undated) DEVICE — CONTAINER SPECIMEN OR STER 4OZ

## (undated) DEVICE — SUT MONOCRYL 4-0 PS-2

## (undated) DEVICE — SUT 2/0 30IN SILK BLK BRAI

## (undated) DEVICE — CLIP HEMO-LOK ML

## (undated) DEVICE — DEVICE SYNCHROSEAL DA VINCI

## (undated) DEVICE — DRAPE STERI INSTRUMENT 1018

## (undated) DEVICE — SUT PROLENE 3-0 SH DA 36 BL

## (undated) DEVICE — ELECTRODE BLADE INSULATED 1 IN

## (undated) DEVICE — TROCAR ENDOPATH XCEL 5X100MM

## (undated) DEVICE — CLIP HEMO-LOK MLX LARGE LF

## (undated) DEVICE — ELECTRODE REM PLYHSV RETURN 9

## (undated) DEVICE — OBTURATOR BLADELESS 8MM XI CLR

## (undated) DEVICE — SEAL CANN UNIVERSAL 5-12MM

## (undated) DEVICE — SUT VICRYL 3-0 27 SH

## (undated) DEVICE — LOOP VESSEL BLUE MAXI

## (undated) DEVICE — CONTAINER SPECIMEN STRL 4OZ

## (undated) DEVICE — IRRIGATOR ENDOSCOPY DISP.

## (undated) DEVICE — SPONGE LAP 4X18 PREWASHED

## (undated) DEVICE — SCISSOR 5MMX35CM DIRECT DRIVE

## (undated) DEVICE — TRAY CATH 1-LYR URIMTR 16FR

## (undated) DEVICE — BLADE SURG CARBON STEEL SZ11

## (undated) DEVICE — NDL INSUF ULTRA VERESS 120MM

## (undated) DEVICE — PAD PINK TRENDELENBURG POS XL

## (undated) DEVICE — ADHESIVE DERMABOND ADVANCED